# Patient Record
Sex: MALE | Race: ASIAN
[De-identification: names, ages, dates, MRNs, and addresses within clinical notes are randomized per-mention and may not be internally consistent; named-entity substitution may affect disease eponyms.]

---

## 2017-01-12 ENCOUNTER — HOSPITAL ENCOUNTER (INPATIENT)
Dept: HOSPITAL 36 - ER | Age: 32
LOS: 21 days | Discharge: NURSING, REHAB OR LONG-TERM CARE | DRG: 247 | End: 2017-02-02
Attending: INTERNAL MEDICINE | Admitting: INTERNAL MEDICINE
Payer: MEDICAID

## 2017-01-12 DIAGNOSIS — R13.10: ICD-10-CM

## 2017-01-12 DIAGNOSIS — E46: ICD-10-CM

## 2017-01-12 DIAGNOSIS — E86.0: ICD-10-CM

## 2017-01-12 DIAGNOSIS — D64.9: ICD-10-CM

## 2017-01-12 DIAGNOSIS — Z93.1: ICD-10-CM

## 2017-01-12 DIAGNOSIS — K56.41: Primary | ICD-10-CM

## 2017-01-12 DIAGNOSIS — M81.0: ICD-10-CM

## 2017-01-12 DIAGNOSIS — Z91.14: ICD-10-CM

## 2017-01-12 DIAGNOSIS — G80.0: ICD-10-CM

## 2017-01-12 DIAGNOSIS — H66.90: ICD-10-CM

## 2017-01-12 DIAGNOSIS — F79: ICD-10-CM

## 2017-01-12 DIAGNOSIS — G40.909: ICD-10-CM

## 2017-01-12 DIAGNOSIS — R53.81: ICD-10-CM

## 2017-01-12 DIAGNOSIS — M85.80: ICD-10-CM

## 2017-01-12 DIAGNOSIS — Z98.890: ICD-10-CM

## 2017-01-12 DIAGNOSIS — M19.90: ICD-10-CM

## 2017-01-12 DIAGNOSIS — Z74.01: ICD-10-CM

## 2017-01-12 LAB
ERYTHROCYTE [DISTWIDTH] IN BLOOD BY AUTOMATED COUNT: 12.5 % (ref 11.5–20)
HCT VFR BLD CALC: 54.2 % (ref 39–49)
HGB BLD-MCNC: 18.7 GM/DL (ref 13.2–17.3)
MCH RBC QN AUTO: 30.5 PG (ref 26–30)
MCHC RBC AUTO-ENTMCNC: 34.5 PG (ref 28–36)
MCV RBC AUTO: 88.5 FL (ref 80–99)
PLATELET # BLD: 239 TH/CMM (ref 150–400)
PMV BLD AUTO: 8.2 FL
RBC # BLD AUTO: 6.12 MIL/CMM (ref 4.3–5.7)
WBC # BLD AUTO: 12.9 TH/CMM (ref 4.8–10.8)

## 2017-01-12 PROCEDURE — Z7610: HCPCS

## 2017-01-12 PROCEDURE — Z7506: HCPCS

## 2017-01-12 PROCEDURE — C9113 INJ PANTOPRAZOLE SODIUM, VIA: HCPCS

## 2017-01-12 NOTE — ED PHYSICIAN CHART
Chief Complaint/HPI





- Patient Information


Date Seen:: 01/12/17


Time Seen:: 23:15


Chief Complaint:: vomiting


History of Present Illness:: 





has vomited 4 times since 1600.  No diarrhea.





Allergies:: 


 Allergies











Allergy/AdvReac Type Severity Reaction Status Date / Time


 


MDX No Known Allergies - Nka Allergy   Verified 12/04/12 10:42





[No Known Allergies - Nka]     











Historian:: EMS, Family Member


Review:: Nurse's Note Reviewed, Transfer documents Reviewed





Review of Systems





- Review of Systems


General/Constitutional: No fever, No chills


Skin: No skin lesions


Head: No headache


Eyes: No loss of vision


ENT: No earache


Neck: No neck pain


Cardio Vascular: No chest pain, No palpitations


Pulmonary: No SOB


GI: Vomiting


G/U: No dysuria


Musculoskeletal: No bone or joint pain


Endocrine: No polyuria, No polydipsia


Psychiatric: No prior psych history


Hematopoietic: No bruising


Allergic/Immuno: No urticaria


Neurological: No syncope





Past Medical History





- Past Medical History


Past Medical History: Arthritis, Other (cerebral palsy; spastic quadrplegia; 

osteopenia; dysphagia; chronic otitis media; osteoporosis)


Family History: Other (non-contributory)


Social History: Care Facility


Surgical History: PEG/GTube


Medication: Reviewed





Labs/Radiology/EKG Results





- Lab Results


Results: 


CXR: negative.  KUB: small bowel obstruction; large amount of stool in recto-

sigmoid


 Laboratory Results - last 24 hr











  01/12/17 01/12/17





  23:39 23:39


 


WBC  12.9 H D 


 


RBC  6.12 H 


 


Hgb  18.7 H D 


 


Hct  54.2 H D 


 


MCV  88.5 


 


MCH  30.5 H 


 


MCHC Differential  34.5 


 


RDW  12.5 


 


Plt Count  239  D 


 


MPV  8.2 


 


Band Neutrophils %  2 


 


Neutrophils (Manual)  90 H 


 


Lymphocytes  8 L 


 


Platelet Estimate  ADEQUATE 


 


Sodium   145


 


Potassium   4.0


 


Chloride   97 L


 


Carbon Dioxide   27.6


 


Anion Gap   24.4 H


 


BUN   28 H


 


Creatinine   1.3


 


Est GFR ( Amer)   > 60.0


 


Est GFR (Non-Af Amer)   > 60.0


 


BUN/Creatinine Ratio   21.5


 


Glucose   136 H


 


Calcium   11.5 H


 


Lipase   13














- Radiology Results


Results: 





chest x-ray negative; KUB: SBO; large stool in rectosigmoid





ED Septic Shock





- .


Is Septic Shock (SBP<90, OR Lactate>4 mmol\L) present?: No





Reassessment (Disposition)





- Reassessment


Reassessment Condition:: Unchanged





- Diagnosis


Diagnosis:: 





small bowel obstruction; leukocytosis; obstipation; profound mental retardation





- Patient Disposition


Admitted to:: Med/Surg


Spoke to:: Nicholas Calero


Admitting Medical Physician:: Nicholas Calero


Condition at Disposition:: Stable, Unchanged

## 2017-01-12 NOTE — ADMIT CRITERIA FORM
Admit Criteria Forms





- Admit Criteria


Diagnosis: 








VOMITING: OBSERVATION CARE





USE THIS FORM ONLY WHEN INPATIENT ADMISSION CRITERIA ARE NOT MET.





(Place X for any and all applicable criteria):





Placement for observation care is indicated for  patient with ANY ONE of the 

following(1)(2)(3)(4)(5)(6):


[]I.    Hemodynamic instability


[X]II.   Vomiting refractory to outpatient treatment (ie, precluding oral 

rehydration) 


[]III.  Electrolyte imbalance unable to be corrected in outpatient setting


[]IV.  Inability to take necessary medication or fluids orally


[]V.   Child whose situation includes ANY ONE of the following:


        []a)  Clinical response to outpatient therapy uncertain


        []b)  Outpatient supervision by parents or caregivers uncertain


[]VI.  Other observation care needs ( Use General Criteria: Observation Care)











The original [facility] content created by [facility] has been revised. 


The portions of the content which have been revised are identified through the 

use of italic text, 


and [facility] has neither reviewed nor approved the modified 

material. 


All other unmodified content is copyright  [facility].





Please see references footnoted in the original [facility] edition 

2016





Admit Criteria Met?: Yes

## 2017-01-13 LAB
ALBUMIN/GLOB SERPL: 1.2 {RATIO} (ref 1–1.8)
ALP SERPL-CCNC: 73 U/L (ref 34–104)
ALT SERPL-CCNC: 9 U/L (ref 7–52)
ANION GAP SERPL CALC-SCNC: 12.5 MMOL/L (ref 7–16)
ANION GAP SERPL CALC-SCNC: 24.4 MMOL/L (ref 7–16)
AST SERPL-CCNC: 14 U/L (ref 13–39)
BILIRUB SERPL-MCNC: 0.5 MG/DL (ref 0.3–1)
BUN SERPL-MCNC: 25 MG/DL (ref 7–25)
BUN SERPL-MCNC: 28 MG/DL (ref 7–25)
BUN/CREAT SERPL: 21.5
BUN/CREAT SERPL: 35.7
CALCIUM SERPL-MCNC: 11.5 MG/DL (ref 8.6–10.3)
CALCIUM SERPL-MCNC: 9.2 MG/DL (ref 8.6–10.3)
CHLORIDE SERPL-SCNC: 108 MEQ/L (ref 98–107)
CHLORIDE SERPL-SCNC: 97 MEQ/L (ref 98–107)
CO2 SERPL-SCNC: 27.6 MEQ/L (ref 21–31)
CO2 SERPL-SCNC: 27.9 MEQ/L (ref 21–31)
CREAT SERPL-MCNC: 0.7 MG/DL (ref 0.7–1.3)
CREAT SERPL-MCNC: 1.3 MG/DL (ref 0.7–1.3)
EOSINOPHIL NFR BLD: 0 % (ref 0–5)
EOSINOPHIL NFR BLD: 0 % (ref 0–5)
ERYTHROCYTE [DISTWIDTH] IN BLOOD BY AUTOMATED COUNT: 12.7 % (ref 11.5–20)
GLOBULIN SER-MCNC: 3.4 GM/DL
GLUCOSE SERPL-MCNC: 136 MG/DL (ref 70–105)
GLUCOSE SERPL-MCNC: 139 MG/DL (ref 70–105)
HCT VFR BLD CALC: 45.5 % (ref 39–49)
HGB BLD-MCNC: 15.4 GM/DL (ref 13.2–17.3)
LIPASE SERPL-CCNC: 13 U/L (ref 11–82)
MCH RBC QN AUTO: 30.2 PG (ref 26–30)
MCHC RBC AUTO-ENTMCNC: 33.8 PG (ref 28–36)
MCV RBC AUTO: 89.6 FL (ref 80–99)
NEUTROPHILS NFR BLD AUTO: 78 % (ref 40–80)
NEUTROPHILS NFR BLD AUTO: 90 % (ref 40–80)
NEUTS BAND NFR BLD: 2 % (ref 0–10)
NEUTS BAND NFR BLD: 5 % (ref 0–10)
PLAT MORPH BLD: NORMAL
PLATELET # BLD EST: ADEQUATE 10*3/UL
PLATELET # BLD EST: ADEQUATE 10*3/UL
PLATELET # BLD: 204 TH/CMM (ref 150–400)
PMV BLD AUTO: 8.5 FL
POTASSIUM SERPL-SCNC: 3.4 MEQ/L (ref 3.5–5.1)
POTASSIUM SERPL-SCNC: 4 MEQ/L (ref 3.5–5.1)
RBC # BLD AUTO: 5.08 MIL/CMM (ref 4.3–5.7)
RBC MORPH BLD: NORMAL
SODIUM SERPL-SCNC: 145 MEQ/L (ref 136–145)
SODIUM SERPL-SCNC: 145 MEQ/L (ref 136–145)
TOTAL CELLS COUNTED BLD: 100
TOTAL CELLS COUNTED BLD: 100
WBC # BLD AUTO: 8.5 TH/CMM (ref 4.8–10.8)

## 2017-01-13 RX ADMIN — DEXTROSE AND SODIUM CHLORIDE SCH MLS/HR: 5; .45 INJECTION, SOLUTION INTRAVENOUS at 03:00

## 2017-01-13 RX ADMIN — MAGNESIUM HYDROXIDE SCH ML: 400 SUSPENSION ORAL at 21:53

## 2017-01-13 NOTE — HISTORY & PHYSICAL
CHIEF COMPLAINT:  Vomiting.



HISTORY OF PRESENT ILLNESS:  This is a 31-year-old male who is a resident of

[facility] who is brought here to [facility] for

reports of vomiting x 3 since yesterday afternoon.  The patient did not have any

diarrhea or any fevers.  For this reason, the patient is now admitted to the

med/surg unit.



PAST MEDICAL HISTORY:  Arthritis, cerebral palsy, spastic quadriplegia,

osteopenia, dysphagia.



FAMILY HISTORY:  Noncontributory.



ALLERGIES:  No drug allergies.



SOCIAL HISTORY:  The patient resides at [facility], requiring 24-hour

nursing care.



PAST SURGICAL HISTORY:  PEG.



MEDICATIONS:  Please see medication reconciliation sheet.



REVIEW OF SYSTEMS:  Unable to obtain, the patient is non-interactive.



PHYSICAL EXAMINATION:

GENERAL:  The patient is well developed, well nourished, no acute distress.

VITAL SIGNS:  Temperature 99, heart rate 90, blood pressure 170/71, respirations

18, O2 100%.

HEENT:  Head; normocephalic, atraumatic.

NECK:  Supple.  No mass.

LUNGS:  Clear bilaterally upon auscultation.

CARDIOVASCULAR:  Regular rhythm.  No murmurs or gallops.

SKIN:  Intact, warm and dry to touch.

ABDOMEN:  Soft, nontender, nondistended.  Positive bowel sounds in all 4

quadrants.



LABORATORY DATA:  WBC 12.9, H and H 18.7 and 54.2.  Sodium 145, potassium 4.0,

chloride 97, BUN ____, creatinine 1.3.



The patient had a chest x-ray done and the impression is no focal acute

pulmonary processes.



ASSESSMENT:  Abdominal pain, intractable vomiting, spastic quadriplegia,

intellectual disability and seizures.



PLAN:  The patient to be admitted to the med/surg unit.  The patient to have a

consultation with Dr. Ames.  The patient to be kept n.p.o. for now.  IV fluids

for hydration.  We will continue to monitor the patient.





DD: 01/13/2017 12:58

DT: 01/13/2017 17:28

JOB# [id number]  [id number]

## 2017-01-13 NOTE — INTERNAL MEDICINE PROG NOTE
Internal Medicine Subjective





- Subjective


Service Date: 01/13/17 ([facility] dictated [id number])





Internal Medicine Objective





- Results


Result Diagrams: 


 01/13/17 06:16





 01/13/17 06:16


Recent Labs: 


 Laboratory Last Values











WBC  8.5 Th/cmm (4.8-10.8)  D 01/13/17  06:16    


 


RBC  5.08 Mil/cmm (4.30-5.70)   01/13/17  06:16    


 


Hgb  15.4 gm/dL (13.2-17.3)  D 01/13/17  06:16    


 


Hct  45.5 % (39.0-49.0)  D 01/13/17  06:16    


 


MCV  89.6 fl (80-99)   01/13/17  06:16    


 


MCH  30.2 pg (26.0-30.0)  H  01/13/17  06:16    


 


MCHC Differential  33.8 pg (28.0-36.0)   01/13/17  06:16    


 


RDW  12.7 % (11.5-20.0)   01/13/17  06:16    


 


Plt Count  204 Th/cmm (150-400)   01/13/17  06:16    


 


MPV  8.5 fl  01/13/17  06:16    


 


Band Neutrophils %  5 % (0-10)   01/13/17  06:16    


 


Neutrophils (Manual)  78 % (40-80)   01/13/17  06:16    


 


Lymphocytes  7 % (20-50)  L  01/13/17  06:16    


 


Monocytes  10 % (2-10)   01/13/17  06:16    


 


Eosinophils  0 % (0-5)   01/13/17  06:16    


 


Platelet Estimate  ADEQUATE  (NORMAL)   01/13/17  06:16    


 


Platelet Morphology  NORMAL  (NORMAL)   01/13/17  06:16    


 


RBC Morph Micro Appear  NORMAL  (NORMAL)   01/13/17  06:16    


 


Sodium  145 mEq/L (136-145)   01/13/17  06:16    


 


Potassium  3.4 mEq/L (3.5-5.1)  L  01/13/17  06:16    


 


Chloride  108 mEq/L ()  H  01/13/17  06:16    


 


Carbon Dioxide  27.9 mEq/L (21.0-31.0)   01/13/17  06:16    


 


Anion Gap  12.5  (7.0-16.0)   01/13/17  06:16    


 


BUN  25 mg/dL (7-25)   01/13/17  06:16    


 


Creatinine  0.7 mg/dL (0.7-1.3)   01/13/17  06:16    


 


Est GFR ( Amer)  > 60.0 ml/min  01/13/17  06:16    


 


Est GFR (Non-Af Amer)  > 60.0 ml/min  01/13/17  06:16    


 


BUN/Creatinine Ratio  35.7   01/13/17  06:16    


 


Glucose  139 mg/dL ()  H  01/13/17  06:16    


 


Calcium  9.2 mg/dL (8.6-10.3)   01/13/17  06:16    


 


Total Bilirubin  0.5 mg/dL (0.3-1.0)   01/13/17  06:16    


 


AST  14 U/L (13-39)   01/13/17  06:16    


 


ALT  9 U/L (7-52)   01/13/17  06:16    


 


Alkaline Phosphatase  73 U/L ()   01/13/17  06:16    


 


Total Protein  7.6 gm/dL (6.0-8.3)   01/13/17  06:16    


 


Albumin  4.2 gm/dL (4.2-5.5)   01/13/17  06:16    


 


Globulin  3.4 gm/dL  01/13/17  06:16    


 


Albumin/Globulin Ratio  1.2  (1.0-1.8)   01/13/17  06:16    


 


Lipase  13 U/L (11-82)   01/12/17  23:39    


 


TSH  0.20 uIU/ml (0.34-5.60)  L  01/13/17  06:16    














- Physical Exam


Vitals and I&O: 


 Vital Signs











Temp  99 F   01/13/17 05:00


 


Pulse  90   01/13/17 05:00


 


Resp  18   01/13/17 05:00


 


BP  117/71   01/13/17 05:00


 


Pulse Ox  100   01/13/17 05:00








 Intake & Output











 01/12/17 01/13/17 01/13/17





 18:59 06:59 18:59


 


Intake Total  4000.000 


 


Balance  4000.000 


 


Intake:   


 


  Intake, IV Amount  4000.000 











Active Medications: 


 Current Medications





Acetaminophen (Tylenol)  650 mg PO Q4HR PRN


   PRN Reason: Pain or Fever >101


   Stop: 03/13/17 23:50


Bisacodyl (Dulcolax 10 Mg Supp)  10 mg RC DAILY PRN


   PRN Reason: Constipation


   Stop: 03/13/17 23:52


Clonidine HCl (Catapres)  0.1 mg PO Q6HR PRN


   PRN Reason: SBP GREATER THAN 160


   Stop: 03/13/17 23:50


Dextrose/Sodium Chloride (D5-0.45ns)  1,000 mls @ 100 mls/hr IV .Q10H ALISON


   Stop: 03/13/17 23:44


   Last Admin: 01/13/17 03:00 Dose:  100 mls/hr


Magnesium Hydroxide (Milk Of Magnesia)  30 ml PO Q72H PRN


   PRN Reason: Constipation


   Stop: 03/13/17 23:52


Mineral Oil (Mineral Oil 30 Ml)  30 ml GT Q72H PRN


   PRN Reason: Constipation


   Stop: 03/13/17 23:52


Morphine Sulfate (Morphine)  2 mg IVP Q4HR PRN


   PRN Reason: Pain (Severe)


   Stop: 03/13/17 23:50


Ondansetron HCl (Zofran)  4 mg IV Q8H PRN


   PRN Reason: Nausea / Vomiting


   Stop: 03/13/17 23:50


Pantoprazole Sodium (Protonix)  40 mg IVP BID [facility]


   Stop: 03/14/17 08:59


   Last Admin: 01/13/17 10:48 Dose:  40 mg


Prochlorperazine (Compazine)  25 mg RC Q12H PRN; Protocol


   PRN Reason: Nausea


   Stop: 03/13/17 23:52


Sodium Phosphate (Fleet Enema)  135 ml RC Q72H PRN


   PRN Reason: Constipation


   Stop: 03/13/17 23:52


Sodium Phosphate (Fleet Enema)  135 ml RC X1 ONE


   Stop: 01/13/17 12:52


Vitamin D (Vitamin D)  400 iu GT DAILY [facility]


   Stop: 03/14/17 08:59


   Last Admin: 01/13/17 10:44 Dose:  Not Given











- Procedures


Procedures: 


 Procedures











Procedure Code Date


 


EGD BIOPSY SINGLE/MULTIPLE 19685 07/10/14


 


ESOPHAGOGASTRODUODENOSCOPY [EGD] W/CLOSED BIOPSY 45.16 07/10/14


 


INFLUENZA VACCINATION 99.52 01/30/14


 


INSERT INDWELLING CATH 57.94 12/03/12


 


INSERT INTESTINAL TUBE 96.08 12/03/12


 


INSERT PICC CATH [id number] 12/03/12


 


INSERT TEMP BLADDER CATH [id number] 12/03/12


 


OPEN AND OTHER CECECTOMY 45.72 12/03/12


 


OPEN AND OTHER RIGHT HEMICOLECTOMY 45.73 12/03/12


 


PARTIAL REMOVAL OF COLON 50804 12/03/12


 


UNLISTED PX SMALL INTESTINE 25689 12/03/12


 


VENOUS CATHETERIZATION NEC 38.93 12/03/12














Internal Medicine Assmt/Plan





- Assessment


Assessment: 





abdominal pain, intractable vomiting

## 2017-01-13 NOTE — DIAGNOSTIC IMAGING REPORT
CHEST X-RAY: AP view



INDICATION: Small bowel obstruction



COMPARISON: KUB the same day



FINDINGS: No focal consolidation or pleural effusions. Heart size is

normal. Distended bowel loops are seen along the right upper quadrant.

Osseous structures are intact.



IMPRESSION:



No focal acute pulmonary process. Distended bowel loops along the right

upper quadrant. Please refer to KUB the same day for further findings.

## 2017-01-13 NOTE — DIAGNOSTIC IMAGING REPORT
KUB



History: Obstruction, constipation



comparison: Chest x-ray the same day



Findings: There is severe distal fecal impaction. Diffuse distended

loops of bowel are seen proximally including markedly distended small

bowel loops. No evidence of gross free air. A gastric feeding tube is

noted. Spinal scoliosis is noted. 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

dysplasia of both hips.



IMPRESSION:



Severe distal fecal impaction. Diffuse distended loops of bowel are seen

proximally including markedly distended small bowel loops. The distended

small bowel loops may be due to patient's severe distal fecal impaction,

however, a distal small bowel obstructive process cannot be completely

excluded. Clinical correlation and follow-up is recommended.

## 2017-01-13 NOTE — ADMIT CRITERIA FORM
Admit Criteria Forms





- Admit Criteria


Diagnosis: 





                                                   VOMITING





Clinical Indications for Admission to Inpatient Care





                                                                             (

Place 'X' for any and all applicable criteria):





Admission is indicated for ANY ONE of the following(1)(2)(3): 


[ ]I.    Inpatient admission required rather than observation care because of 

ANY ONE of the  following:


         [ ]i)      Hemodynamic instability that is severe or persistent 


         [ ]ii)      Vomiting that is severe or persistent


         [ ]iii)     Severe electrolyte abnormalities requiring inpatient care


         [ ]iv)     Severe pain requiring acute inpatient management


         [ ]v)      High fever or infection requiring inpatient admission as 

indicated by ANY ONE of the following(7)(8):


                     [ ]1)   Appropriate outpatient or observation care 

antimicrobial treatment unavailable, not effective, or not feasible 


                     [ ]2)   Documented bacteremia


                     [ ]3)   Temp >104.9 degrees F (40.5 degrees C) (oral)


                     [ ]4)   Temp >103.1 degrees F (39.5 C) (oral) or <96.8 

degrees F (36 C) (rectal) that 


                              does not respond to all emergency treatment 

measures


         [ ]vi)     Acute renal failure


         [ ]vii)    IV fluid to replace significant ongoing losses (greater 

than 3 L/m2 per day)


         [ ]viii)    Parenteral nutrition regimen that must be implemented on 

inpatient basis


         [ ]ix)     Other condition, treatment or monitoring requiring 

inpatient admission  


[X ]II.    Complete or partial gastrointestinal obstruction


[ ]III.   Other cause of vomiting requiring hospitalization (eg, poisoning, 

increased intracranial pressure)


[ ]IV.   Vomiting due to significant metabolic derangement (eg, severe 

hypercalcemia, diabetic ketoacidosis)











Extended stay beyond goal length of stay may be needed for(1)(4):


[ ]a)     Severe vomiting


[ ]b)     Persistent vomiting, vital sign changes, severe electrolyte imbalance

, or diagnosed cause of vomiting that


           requires continued hospitalization (eg, gastrointestinal obstruction

, increased intracranial pressure)


[ ]c)     Surgery to treat identified causes of vomiting (eg, bowel obstruction

, intracranial process)


[ ]d)     Comorbid illness that requires inpatient care (eg, acute heart failure

, renal failure)


[ ]e)     Need for inpatient endoscopy








The original [facility] content created by [facility] has been revised. 


The portions of the content which have been revised are identified through the 

use of italic text or in bold, and [facility] 


has neither reviewed nor approved the modified material. All other unmodified 

content is copyright  [facility].





Please see references footnoted in the original [facility] edition 

2016





Admit Criteria Met?: Yes

## 2017-01-14 LAB
BACTERIA #/AREA URNS HPF: (no result) /HPF
BILIRUB UR-MCNC: NEGATIVE MG/DL
COLOR UR: YELLOW
EPI CELLS URNS QL MICRO: (no result) /LPF
GLUCOSE UR STRIP-MCNC: NEGATIVE MG/DL
KETONES UR STRIP-MCNC: NEGATIVE MG/DL
PH UR STRIP: >=9 [PH]
PROT UR STRIP-MCNC: (no result) MG/DL
RBC # UR STRIP: NEGATIVE /UL
RBC #/AREA URNS HPF: (no result) /HPF (ref 0–5)
UROBILINOGEN UR STRIP-ACNC: 0.2 E.U./DL (ref 0.2–1)
WBC #/AREA URNS HPF: (no result) /HPF (ref 0–5)

## 2017-01-14 RX ADMIN — DEXTROSE AND SODIUM CHLORIDE SCH MLS/HR: 5; .45 INJECTION, SOLUTION INTRAVENOUS at 05:15

## 2017-01-14 RX ADMIN — MAGNESIUM HYDROXIDE SCH ML: 400 SUSPENSION ORAL at 21:57

## 2017-01-14 RX ADMIN — DEXTROSE AND SODIUM CHLORIDE SCH MLS/HR: 5; .45 INJECTION, SOLUTION INTRAVENOUS at 21:56

## 2017-01-14 NOTE — CONSULTATION
REASON FOR CONSULTATION:  Abdominal pain and vomiting.



HISTORY OF PRESENT ILLNESS:  This consult was obtained through the courtesy of

Dr. Calero for this 31-year-old with history of mental retardation, cerebral

palsy, spastic quadriplegia, osteopenia, dysphagia, status post G-tube and

arthritis, admitted to the hospital because of vomiting few times yesterday. 

The patient came to the hospital.  GI consult was called in for further

evaluation.  The patient is unable to provide any history.  Apparently, the

patient had some fecal impaction, was given some laxative, did better, and now

he seems calmer.



PAST MEDICAL HISTORY:  Cerebral palsy, spastic quadriplegia, arthritis,

osteopenia, dysphagia.



PAST SURGICAL HISTORY:  He had hip surgery and he had abdominal surgery and has

a G-tube.



SOCIAL HISTORY:  Nonsmoker, nonalcoholic, non-IV drug abuser.



FAMILY HISTORY:  Noncontributory.



ALLERGIES:  No known drug allergies.



MEDICATIONS:  The patient is on Tylenol, Dulcolax, clonidine, milk of magnesia,

mineral oil, Zofran, morphine, Protonix, Fleet enema, vitamin D.



REVIEW OF SYSTEMS:  Unobtainable.



PHYSICAL EXAMINATION:

GENERAL:  The patient is awake, nonverbal.

VITAL SIGNS:  Blood pressure is 117/71, heart rate 90, respiratory rate 18,

temperature is 99.0.

HEAD AND NECK:  Pupils reactive to light.  Extraocular muscles could not be

tested.  Oral cavity, no lesion.

NECK:  Supple.  No jugular venous distention.  No carotid bruit or lymph nodes.

CHEST:  Good respiratory movements.

LUNGS:  Clear to auscultation.

CARDIOVASCULAR SYSTEM:  Regular rate and rhythm.  No murmur or gallop.

ABDOMEN:  Soft.  There is scar on midline.  There is a G-tube, which has a

balloon.  Bowel sounds are present.  Nontender.

EXTREMITIES:  Lower extremities, no edema.  There is a scar on the left hip.

CENTRAL NERVOUS SYSTEM:  Unable to evaluate.  Limbs has contracted posture of

the lower ones.



LABORATORY DATA:  White count was 12.9, now is normal.  Potassium was low.  H

and H are normal.  KUB showed fecal impaction.



IMPRESSION:  A 31-year-old with multiple medical problems, now with fecal

impaction, nausea and vomiting.



ASSESSMENT AND PLAN:  Nausea and vomiting most probably due to fecal impaction. 

The patient had good response to laxative, so now he is more comfortable, he is

not vomiting, he is at ease.  So, we will continue his bowel prep.  We will give

him on a daily basis milk of magnesia.  If needed, we can add some more.  Then,

further recommendations to follow.  Given the age of the patient and general

condition, at this time there is no reason for colonoscopy unless some other

issues arise.



Other medical problems such as cerebral palsy, quadriplegia, osteoporosis, etc.,

as per Dr. Calero.



Thank you, Dr. Calero, for allowing me to participate in the care of this

patient.  If you have any further questions, please let me know.





DD: 01/13/2017 19:18

DT: 01/14/2017 04:25

JOB# [id number]  [id number]

## 2017-01-15 RX ADMIN — MAGNESIUM HYDROXIDE SCH ML: 400 SUSPENSION ORAL at 22:52

## 2017-01-15 NOTE — INTERNAL MEDICINE PROG NOTE
Internal Medicine Subjective





- Subjective


Service Date: 01/15/17


Patient seen and examined:: with staff


Patient is:: awake


Per staff patient is:: no adverse event





Internal Medicine Objective





- Results


Result Diagrams: 


 01/13/17 06:16





 01/13/17 06:16


Recent Labs: 


 Laboratory Last Values











WBC  8.5 Th/cmm (4.8-10.8)  D 01/13/17  06:16    


 


RBC  5.08 Mil/cmm (4.30-5.70)   01/13/17  06:16    


 


Hgb  15.4 gm/dL (13.2-17.3)  D 01/13/17  06:16    


 


Hct  45.5 % (39.0-49.0)  D 01/13/17  06:16    


 


MCV  89.6 fl (80-99)   01/13/17  06:16    


 


MCH  30.2 pg (26.0-30.0)  H  01/13/17  06:16    


 


MCHC Differential  33.8 pg (28.0-36.0)   01/13/17  06:16    


 


RDW  12.7 % (11.5-20.0)   01/13/17  06:16    


 


Plt Count  204 Th/cmm (150-400)   01/13/17  06:16    


 


MPV  8.5 fl  01/13/17  06:16    


 


Band Neutrophils %  5 % (0-10)   01/13/17  06:16    


 


Neutrophils (Manual)  78 % (40-80)   01/13/17  06:16    


 


Lymphocytes  7 % (20-50)  L  01/13/17  06:16    


 


Monocytes  10 % (2-10)   01/13/17  06:16    


 


Eosinophils  0 % (0-5)   01/13/17  06:16    


 


Platelet Estimate  ADEQUATE  (NORMAL)   01/13/17  06:16    


 


Platelet Morphology  NORMAL  (NORMAL)   01/13/17  06:16    


 


RBC Morph Micro Appear  NORMAL  (NORMAL)   01/13/17  06:16    


 


Sodium  145 mEq/L (136-145)   01/13/17  06:16    


 


Potassium  3.4 mEq/L (3.5-5.1)  L  01/13/17  06:16    


 


Chloride  108 mEq/L ()  H  01/13/17  06:16    


 


Carbon Dioxide  27.9 mEq/L (21.0-31.0)   01/13/17  06:16    


 


Anion Gap  12.5  (7.0-16.0)   01/13/17  06:16    


 


BUN  25 mg/dL (7-25)   01/13/17  06:16    


 


Creatinine  0.7 mg/dL (0.7-1.3)   01/13/17  06:16    


 


Est GFR ( Amer)  > 60.0 ml/min  01/13/17  06:16    


 


Est GFR (Non-Af Amer)  > 60.0 ml/min  01/13/17  06:16    


 


BUN/Creatinine Ratio  35.7   01/13/17  06:16    


 


Glucose  139 mg/dL ()  H  01/13/17  06:16    


 


Calcium  9.2 mg/dL (8.6-10.3)   01/13/17  06:16    


 


Total Bilirubin  0.5 mg/dL (0.3-1.0)   01/13/17  06:16    


 


AST  14 U/L (13-39)   01/13/17  06:16    


 


ALT  9 U/L (7-52)   01/13/17  06:16    


 


Alkaline Phosphatase  73 U/L ()   01/13/17  06:16    


 


Total Protein  7.6 gm/dL (6.0-8.3)   01/13/17  06:16    


 


Albumin  4.2 gm/dL (4.2-5.5)   01/13/17  06:16    


 


Globulin  3.4 gm/dL  01/13/17  06:16    


 


Albumin/Globulin Ratio  1.2  (1.0-1.8)   01/13/17  06:16    


 


Lipase  13 U/L (11-82)   01/12/17  23:39    


 


TSH  0.20 uIU/ml (0.34-5.60)  L  01/13/17  06:16    


 


Urine Source  CLEAN C   01/14/17  21:35    


 


Urine Color  YELLOW   01/14/17  21:35    


 


Urine Clarity  CLEAR  (CLEAR)   01/14/17  21:35    


 


Urine pH  >=9.0   01/14/17  21:35    


 


Ur Specific Gravity  1.020  (1.005-1.030)   01/14/17  21:35    


 


Urine Protein  TRACE mg/dL (NEGATIVE)   01/14/17  21:35    


 


Urine Glucose (UA)  NEGATIVE mg/dL (NEGATIVE)   01/14/17  21:35    


 


Urine Ketones  NEGATIVE mg/dL (NEGATIVE)   01/14/17  21:35    


 


Urine Blood  NEGATIVE  (NEGATIVE)   01/14/17  21:35    


 


Urine Nitrate  NEGATIVE  (NEGATIVE)   01/14/17  21:35    


 


Urine Bilirubin  NEGATIVE  (NEGATIVE)   01/14/17  21:35    


 


Urine Urobilinogen  0.2 E.U./dL (0.2 - 1.0)   01/14/17  21:35    


 


Ur Leukocyte Esterase  NEGATIVE  (NEGATIVE)   01/14/17  21:35    


 


Urine RBC  0-2 /hpf (0-5)  H  01/14/17  21:35    


 


Urine WBC  0-2 /hpf (0-5)   01/14/17  21:35    


 


Ur Epithelial Cells  NONE SEEN /lpf (FEW)   01/14/17  21:35    


 


Urine Bacteria  FEW /hpf (NONE SEEN)   01/14/17  21:35    














- Physical Exam


Vitals and I&O: 


 Vital Signs











Temp  98.1 F   01/15/17 16:00


 


Pulse  83   01/15/17 16:00


 


Resp  17   01/15/17 16:00


 


BP  112/40   01/15/17 16:00


 


Pulse Ox  95   01/15/17 16:00








 Intake & Output











 01/14/17 01/15/17 01/15/17





 18:59 06:59 18:59


 


Intake Total 360  


 


Balance 360  


 


Intake:   


 


  Tube Feeding 240  


 


  Other 120  











Active Medications: 


 Current Medications





Acetaminophen (Tylenol)  650 mg PO Q4HR PRN


   PRN Reason: Pain or Fever >101


   Stop: 03/13/17 23:50


Bisacodyl (Dulcolax 10 Mg Supp)  10 mg RC DAILY PRN


   PRN Reason: Constipation


   Stop: 03/13/17 23:52


Clonidine HCl (Catapres)  0.1 mg PO Q6HR PRN


   PRN Reason: SBP GREATER THAN 160


   Stop: 03/13/17 23:50


Dextrose/Sodium Chloride (D5-0.45ns)  1,000 mls @ 70 mls/hr IV .[id number] [facility]


   Stop: 03/15/17 21:46


   Last Admin: 01/14/17 21:56 Dose:  70 mls/hr


Magnesium Hydroxide (Milk Of Magnesia)  30 ml PO HS [facility]


   Stop: 03/14/17 20:59


   Last Admin: 01/14/17 21:57 Dose:  30 ml


Mineral Oil (Mineral Oil 30 Ml)  30 ml GT DAILY [facility]


   Stop: 03/15/17 08:59


   Last Admin: 01/15/17 10:59 Dose:  30 ml


Morphine Sulfate (Morphine)  2 mg IVP Q4HR PRN


   PRN Reason: Pain (Severe)


   Stop: 03/13/17 23:50


Ondansetron HCl (Zofran)  4 mg IV Q8H PRN


   PRN Reason: Nausea / Vomiting


   Stop: 03/13/17 23:50


Pantoprazole Sodium (Protonix)  40 mg IVP BID [facility]


   Stop: 03/14/17 08:59


   Last Admin: 01/15/17 10:59 Dose:  40 mg


Polyethylene Glycol (Miralax)  17 gm PO DAILY [facility]


   Stop: 03/16/17 08:59


   Last Admin: 01/15/17 10:59 Dose:  17 gm


Prochlorperazine (Compazine)  25 mg RC Q12H PRN; Protocol


   PRN Reason: Nausea


   Stop: 03/13/17 23:52


Sodium Phosphate (Fleet Enema)  135 ml RC Q72H PRN


   PRN Reason: Constipation


   Stop: 03/13/17 23:52


Vitamin D (Vitamin D)  400 iu GT DAILY [facility]


   Stop: 03/14/17 08:59


   Last Admin: 01/15/17 10:59 Dose:  400 iu








General: alert


HEENT: NC/AT, PERRLA


Neck: Supple


Lungs: CTAB


Cardiovascular: RRR, Normal S1, Normal S2, without murmur


Abdomen: soft non-tender





- Procedures


Procedures: 


 Procedures











Procedure Code Date


 


EGD BIOPSY SINGLE/MULTIPLE 06210 07/10/14


 


ESOPHAGOGASTRODUODENOSCOPY [EGD] W/CLOSED BIOPSY 45.16 07/10/14


 


INFLUENZA VACCINATION 99.52 01/30/14


 


INSERT INDWELLING CATH 57.94 12/03/12


 


INSERT INTESTINAL TUBE 96.08 12/03/12


 


INSERT PICC CATH [id number] 12/03/12


 


INSERT TEMP BLADDER CATH [id number] 12/03/12


 


OPEN AND OTHER CECECTOMY 45.72 12/03/12


 


OPEN AND OTHER RIGHT HEMICOLECTOMY 45.73 12/03/12


 


PARTIAL REMOVAL OF COLON 37091 12/03/12


 


UNLISTED PX SMALL INTESTINE 22545 12/03/12


 


VENOUS CATHETERIZATION NEC 38.93 12/03/12














Internal Medicine Assmt/Plan





- Assessment


Assessment: 





abdominal pain


 intractable vomitinG





- Plan


Plan: 


ivf for hydration


gtf 


zofran prn


monitor electrolytes


cbc/bmp in am

## 2017-01-15 NOTE — DIAGNOSTIC IMAGING REPORT
KUB abdominal film



HISTORY: Abdominal distention



Compared with the prior exam of 1/12/2017, slight decrease in small

bowel dilatation along with persistent mildly dilated air-filled large

bowel. Stool noted in the rectal region of May be associated with a

degree of fecal impaction.



IMPRESSION:

1. Stool-filled distended rectum suggesting a fecal impaction.

Additional mildly dilated large and small bowel.

## 2017-01-16 LAB
ANION GAP SERPL CALC-SCNC: 8.5 MMOL/L (ref 7–16)
BASOPHILS NFR BLD AUTO: 0.2 % (ref 0–2)
BUN SERPL-MCNC: 12 MG/DL (ref 7–25)
BUN/CREAT SERPL: 24
CALCIUM SERPL-MCNC: 9.5 MG/DL (ref 8.6–10.3)
CHLORIDE SERPL-SCNC: 104 MEQ/L (ref 98–107)
CO2 SERPL-SCNC: 26.6 MEQ/L (ref 21–31)
CREAT SERPL-MCNC: 0.5 MG/DL (ref 0.7–1.3)
EOSINOPHIL NFR BLD AUTO: 5.5 % (ref 0–5)
ERYTHROCYTE [DISTWIDTH] IN BLOOD BY AUTOMATED COUNT: 12.4 % (ref 11.5–20)
GLUCOSE SERPL-MCNC: 115 MG/DL (ref 70–105)
HCT VFR BLD CALC: 40.6 % (ref 39–49)
HGB BLD-MCNC: 14 GM/DL (ref 13.2–17.3)
LYMPHOCYTES NFR BLD AUTO: 33.4 % (ref 20–50)
MCH RBC QN AUTO: 29.7 PG (ref 26–30)
MCHC RBC AUTO-ENTMCNC: 34.4 PG (ref 28–36)
MCV RBC AUTO: 86.2 FL (ref 80–99)
MONOCYTES NFR BLD AUTO: 9 % (ref 2–10)
NEUTROPHILS # BLD: 3.8 TH/CMM (ref 1.8–8)
NEUTROPHILS NFR BLD AUTO: 51.9 % (ref 40–80)
PLATELET # BLD: 168 TH/CMM (ref 150–400)
PMV BLD AUTO: 8.5 FL
POTASSIUM SERPL-SCNC: 4.1 MEQ/L (ref 3.5–5.1)
RBC # BLD AUTO: 4.71 MIL/CMM (ref 4.3–5.7)
SODIUM SERPL-SCNC: 135 MEQ/L (ref 136–145)
WBC # BLD AUTO: 7.4 TH/CMM (ref 4.8–10.8)

## 2017-01-16 RX ADMIN — MAGNESIUM HYDROXIDE SCH ML: 400 SUSPENSION ORAL at 22:36

## 2017-01-16 RX ADMIN — POLYETHYLENE GLYCOL 3350 SCH GM: 17 POWDER, FOR SOLUTION ORAL at 09:39

## 2017-01-16 RX ADMIN — METOCLOPRAMIDE SCH: 5 INJECTION, SOLUTION INTRAMUSCULAR; INTRAVENOUS at 13:00

## 2017-01-16 RX ADMIN — POLYETHYLENE GLYCOL 3350 SCH GM: 17 POWDER, FOR SOLUTION ORAL at 16:44

## 2017-01-16 RX ADMIN — DEXTROSE AND SODIUM CHLORIDE SCH MLS/HR: 5; .45 INJECTION, SOLUTION INTRAVENOUS at 03:48

## 2017-01-16 RX ADMIN — METOCLOPRAMIDE SCH MG: 5 INJECTION, SOLUTION INTRAMUSCULAR; INTRAVENOUS at 05:46

## 2017-01-16 RX ADMIN — METOCLOPRAMIDE SCH MG: 5 INJECTION, SOLUTION INTRAMUSCULAR; INTRAVENOUS at 22:36

## 2017-01-16 NOTE — INTERNAL MEDICINE PROG NOTE
Internal Medicine Subjective





- Subjective


Service Date: 01/16/17


Patient seen and examined:: with staff


Patient is:: awake


Per staff patient is:: no adverse event





Internal Medicine Objective





- Results


Result Diagrams: 


 01/16/17 05:50





 01/16/17 05:50


Recent Labs: 


 Laboratory Last Values











WBC  7.4 Th/cmm (4.8-10.8)   01/16/17  05:50    


 


RBC  4.71 Mil/cmm (4.30-5.70)   01/16/17  05:50    


 


Hgb  14.0 gm/dL (13.2-17.3)   01/16/17  05:50    


 


Hct  40.6 % (39.0-49.0)  D 01/16/17  05:50    


 


MCV  86.2 fl (80-99)   01/16/17  05:50    


 


MCH  29.7 pg (26.0-30.0)   01/16/17  05:50    


 


MCHC Differential  34.4 pg (28.0-36.0)   01/16/17  05:50    


 


RDW  12.4 % (11.5-20.0)   01/16/17  05:50    


 


Plt Count  168 Th/cmm (150-400)   01/16/17  05:50    


 


MPV  8.5 fl  01/16/17  05:50    


 


Neutrophils %  51.9 % (40.0-80.0)   01/16/17  05:50    


 


Band Neutrophils %  5 % (0-10)   01/13/17  06:16    


 


Lymphocytes %  33.4 % (20.0-50.0)   01/16/17  05:50    


 


Monocytes %  9.0 % (2.0-10.0)   01/16/17  05:50    


 


Eosinophils %  5.5 % (0.0-5.0)  H  01/16/17  05:50    


 


Basophils %  0.2 % (0.0-2.0)   01/16/17  05:50    


 


Neutrophils (Manual)  78 % (40-80)   01/13/17  06:16    


 


Lymphocytes  7 % (20-50)  L  01/13/17  06:16    


 


Monocytes  10 % (2-10)   01/13/17  06:16    


 


Eosinophils  0 % (0-5)   01/13/17  06:16    


 


Platelet Estimate  ADEQUATE  (NORMAL)   01/13/17  06:16    


 


Platelet Morphology  NORMAL  (NORMAL)   01/13/17  06:16    


 


RBC Morph Micro Appear  NORMAL  (NORMAL)   01/13/17  06:16    


 


Sodium  135 mEq/L (136-145)  L  01/16/17  05:50    


 


Potassium  4.1 mEq/L (3.5-5.1)   01/16/17  05:50    


 


Chloride  104 mEq/L ()   01/16/17  05:50    


 


Carbon Dioxide  26.6 mEq/L (21.0-31.0)   01/16/17  05:50    


 


Anion Gap  8.5  (7.0-16.0)   01/16/17  05:50    


 


BUN  12 mg/dL (7-25)   01/16/17  05:50    


 


Creatinine  0.5 mg/dL (0.7-1.3)  L  01/16/17  05:50    


 


Est GFR ( Amer)  > 60.0 ml/min  01/16/17  05:50    


 


Est GFR (Non-Af Amer)  > 60.0 ml/min  01/16/17  05:50    


 


BUN/Creatinine Ratio  24.0   01/16/17  05:50    


 


Glucose  115 mg/dL ()  H  01/16/17  05:50    


 


Calcium  9.5 mg/dL (8.6-10.3)   01/16/17  05:50    


 


Total Bilirubin  0.5 mg/dL (0.3-1.0)   01/13/17  06:16    


 


AST  14 U/L (13-39)   01/13/17  06:16    


 


ALT  9 U/L (7-52)   01/13/17  06:16    


 


Alkaline Phosphatase  73 U/L ()   01/13/17  06:16    


 


Total Protein  7.6 gm/dL (6.0-8.3)   01/13/17  06:16    


 


Albumin  4.2 gm/dL (4.2-5.5)   01/13/17  06:16    


 


Globulin  3.4 gm/dL  01/13/17  06:16    


 


Albumin/Globulin Ratio  1.2  (1.0-1.8)   01/13/17  06:16    


 


Lipase  13 U/L (11-82)   01/12/17  23:39    


 


TSH  0.20 uIU/ml (0.34-5.60)  L  01/13/17  06:16    


 


Urine Source  CLEAN C   01/14/17  21:35    


 


Urine Color  YELLOW   01/14/17  21:35    


 


Urine Clarity  CLEAR  (CLEAR)   01/14/17  21:35    


 


Urine pH  >=9.0   01/14/17  21:35    


 


Ur Specific Gravity  1.020  (1.005-1.030)   01/14/17  21:35    


 


Urine Protein  TRACE mg/dL (NEGATIVE)   01/14/17  21:35    


 


Urine Glucose (UA)  NEGATIVE mg/dL (NEGATIVE)   01/14/17  21:35    


 


Urine Ketones  NEGATIVE mg/dL (NEGATIVE)   01/14/17  21:35    


 


Urine Blood  NEGATIVE  (NEGATIVE)   01/14/17  21:35    


 


Urine Nitrate  NEGATIVE  (NEGATIVE)   01/14/17  21:35    


 


Urine Bilirubin  NEGATIVE  (NEGATIVE)   01/14/17  21:35    


 


Urine Urobilinogen  0.2 E.U./dL (0.2 - 1.0)   01/14/17  21:35    


 


Ur Leukocyte Esterase  NEGATIVE  (NEGATIVE)   01/14/17  21:35    


 


Urine RBC  0-2 /hpf (0-5)  H  01/14/17  21:35    


 


Urine WBC  0-2 /hpf (0-5)   01/14/17  21:35    


 


Ur Epithelial Cells  NONE SEEN /lpf (FEW)   01/14/17  21:35    


 


Urine Bacteria  FEW /hpf (NONE SEEN)   01/14/17  21:35    














- Physical Exam


Vitals and I&O: 


 Vital Signs











Temp  97.3 F   01/16/17 08:00


 


Pulse  62   01/16/17 08:00


 


Resp  19   01/16/17 08:00


 


BP  102/56   01/16/17 08:00


 


Pulse Ox  96   01/16/17 08:00








 Intake & Output











 01/15/17 01/16/17 01/16/17





 18:59 06:59 18:59


 


Intake Total 1200  


 


Balance 1200  


 


Intake:   


 


  Intake, IV Amount 1000  


 


    D5-0.45NS 1,000 ml @ 70 1000  





    mls/hr IV .[id number] [facility]   





    #:[id number]   


 


  Oral 200  


 


Other:   


 


  # Voids 3 3 


 


  Stool Characteristics   Soft











Active Medications: 


 Current Medications





Acetaminophen (Tylenol)  650 mg PO Q4HR PRN


   PRN Reason: Pain or Fever >101


   Stop: 03/13/17 23:50


Bisacodyl (Dulcolax 10 Mg Supp)  10 mg RC DAILY PRN


   PRN Reason: Constipation


   Stop: 03/13/17 23:52


Clonidine HCl (Catapres)  0.1 mg PO Q6HR PRN


   PRN Reason: SBP GREATER THAN 160


   Stop: 03/13/17 23:50


Dextrose/Sodium Chloride (D5-0.45ns)  1,000 mls @ 70 mls/hr IV .[id number] [facility]


   Stop: 03/15/17 21:46


   Last Admin: 01/16/17 03:48 Dose:  70 mls/hr


Magnesium Hydroxide (Milk Of Magnesia)  30 ml PO HS [facility]


   Stop: 03/14/17 20:59


   Last Admin: 01/15/17 22:52 Dose:  30 ml


Metoclopramide HCl (Reglan)  10 mg IVP Q8HR [facility]


   Stop: 03/17/17 04:59


   Last Admin: 01/16/17 05:46 Dose:  10 mg


Mineral Oil (Mineral Oil 30 Ml)  30 ml GT DAILY [facility]


   Stop: 03/15/17 08:59


   Last Admin: 01/16/17 09:39 Dose:  30 ml


Morphine Sulfate (Morphine)  2 mg IVP Q4HR PRN


   PRN Reason: Pain (Severe)


   Stop: 03/13/17 23:50


Ondansetron HCl (Zofran)  4 mg IV Q8H PRN


   PRN Reason: Nausea / Vomiting


   Stop: 03/13/17 23:50


Pantoprazole Sodium (Protonix)  40 mg IVP BID [facility]


   Stop: 03/14/17 08:59


   Last Admin: 01/16/17 09:39 Dose:  40 mg


Polyethylene Glycol (Miralax)  17 gm PO BID [facility]


   Stop: 03/17/17 08:59


   Last Admin: 01/16/17 09:39 Dose:  17 gm


Prochlorperazine (Compazine)  25 mg RC Q12H PRN; Protocol


   PRN Reason: Nausea


   Stop: 03/13/17 23:52


Vitamin D (Vitamin D)  400 iu GT DAILY [facility]


   Stop: 03/14/17 08:59


   Last Admin: 01/16/17 09:39 Dose:  400 iu








General: alert


HEENT: NC/AT, PERRLA


Neck: Supple


Lungs: CTAB


Cardiovascular: RRR, Normal S1, Normal S2, without murmur


Abdomen: soft non-tender, positive bowel sound





- Procedures


Procedures: 


 Procedures











Procedure Code Date


 


EGD BIOPSY SINGLE/MULTIPLE 06319 07/10/14


 


ESOPHAGOGASTRODUODENOSCOPY [EGD] W/CLOSED BIOPSY 45.16 07/10/14


 


INFLUENZA VACCINATION 99.52 01/30/14


 


INSERT INDWELLING CATH 57.94 12/03/12


 


INSERT INTESTINAL TUBE 96.08 12/03/12


 


INSERT PICC CATH [id number] 12/03/12


 


INSERT TEMP BLADDER CATH [id number] 12/03/12


 


OPEN AND OTHER CECECTOMY 45.72 12/03/12


 


OPEN AND OTHER RIGHT HEMICOLECTOMY 45.73 12/03/12


 


PARTIAL REMOVAL OF COLON 57415 12/03/12


 


UNLISTED PX SMALL INTESTINE 04239 12/03/12


 


VENOUS CATHETERIZATION NEC 38.93 12/03/12














Internal Medicine Assmt/Plan





- Assessment


Assessment: 





abdominal pain


 intractable vomiting- resolved








- Plan


Plan: 


ivf for hydration


awaiting for results for abdominal US results


zofran prn


monitor electrolytes


cbc/bmp in am

## 2017-01-16 NOTE — DIAGNOSTIC IMAGING REPORT
CT scan abdomen and pelvis without intravenous contrast



HISTORY: Nausea, vomiting



The liver exhibits a homogeneous parenchyma. No focal lesions. The

spleen appears normal. The suboptimal delineation the margins of the

pancreas. No focal abnormalities. Gastrostomy tube noted. No significant

focal renal lesions.



The exam of the pelvis demonstrate a markedly distended stool and

air-filled rectum and lower sigmoid colon. Additional dilated loops of

large bowel noted. There is poor delineation of the small bowel wall

margins due to a paucity of intra-abdominal fat. No obvious abnormal

masses or fluid collections.



IMPRESSION:

1. Markedly distended stool-filled air-filled rectum associated with

generalized dilatation of the colon. Suggestion of dilated loops of

small bowel. However, bowel wall margins are poorly defined due to a

paucity of intra-abdominal fat. Colonic and bowel dilatation is less

pronounced compared to a prior CT scan of December 2, 2012. Findings may

be at least partially related to fecal impaction along with a chronic

etiology. Clinical correlation is needed.

## 2017-01-16 NOTE — DIAGNOSTIC IMAGING REPORT
Abdominal ultrasound



HISTORY: Nausea, vomiting



Exam is very limited due to considerable bowel gas.



There is incomplete visualization the liver with no definite focal

lesions. No definite intraluminal abnormality seen within the

gallbladder. The common bile duct is difficult to visualized, but

appears to be somewhat generous in caliber (9 mm). Findings should be

correlated clinically and with laboratory data. The pancreas cannot be

seen. There is poor visualization of the right kidney with no obvious

focal lesions or hydronephrosis. The left kidney and spleen cannot be

seen.



IMPRESSION:

1. Very Limited/suboptimal exam with incomplete visualization

particularly of the left abdomen and liver.

2. Questionable dilatation of the common bile duct (9 mm). The findings

should be correlated clinically and with laboratory data.

## 2017-01-17 LAB
ANION GAP SERPL CALC-SCNC: 19 MMOL/L (ref 7–16)
BASOPHILS NFR BLD: 1 % (ref 0–3)
BUN SERPL-MCNC: 15 MG/DL (ref 7–25)
BUN/CREAT SERPL: 25
CALCIUM SERPL-MCNC: 9.6 MG/DL (ref 8.6–10.3)
CHLORIDE SERPL-SCNC: 108 MEQ/L (ref 98–107)
CO2 SERPL-SCNC: 18.7 MEQ/L (ref 21–31)
CREAT SERPL-MCNC: 0.6 MG/DL (ref 0.7–1.3)
EOSINOPHIL NFR BLD: 2 % (ref 0–5)
ERYTHROCYTE [DISTWIDTH] IN BLOOD BY AUTOMATED COUNT: 12.4 % (ref 11.5–20)
GLUCOSE SERPL-MCNC: 95 MG/DL (ref 70–105)
HCT VFR BLD CALC: 44.6 % (ref 39–49)
HGB BLD-MCNC: 14.7 GM/DL (ref 13.2–17.3)
MCH RBC QN AUTO: 29.1 PG (ref 26–30)
MCHC RBC AUTO-ENTMCNC: 33 PG (ref 28–36)
MCV RBC AUTO: 88.3 FL (ref 80–99)
NEUTROPHILS NFR BLD AUTO: 42 % (ref 40–80)
PLAT MORPH BLD: NORMAL
PLATELET # BLD EST: ADEQUATE 10*3/UL
PLATELET # BLD: 154 TH/CMM (ref 150–400)
PMV BLD AUTO: 8.6 FL
POTASSIUM SERPL-SCNC: 4.7 MEQ/L (ref 3.5–5.1)
RBC # BLD AUTO: 5.05 MIL/CMM (ref 4.3–5.7)
RBC MORPH BLD: NORMAL
SODIUM SERPL-SCNC: 141 MEQ/L (ref 136–145)
TOTAL CELLS COUNTED BLD: 100
WBC # BLD AUTO: 7.8 TH/CMM (ref 4.8–10.8)

## 2017-01-17 RX ADMIN — MAGNESIUM HYDROXIDE SCH ML: 400 SUSPENSION ORAL at 21:54

## 2017-01-17 RX ADMIN — POLYETHYLENE GLYCOL 3350 SCH GM: 17 POWDER, FOR SOLUTION ORAL at 18:44

## 2017-01-17 RX ADMIN — METOCLOPRAMIDE SCH MG: 5 INJECTION, SOLUTION INTRAMUSCULAR; INTRAVENOUS at 05:52

## 2017-01-17 RX ADMIN — METOCLOPRAMIDE SCH MG: 5 INJECTION, SOLUTION INTRAMUSCULAR; INTRAVENOUS at 14:20

## 2017-01-17 RX ADMIN — METOCLOPRAMIDE SCH MG: 5 INJECTION, SOLUTION INTRAMUSCULAR; INTRAVENOUS at 21:54

## 2017-01-17 RX ADMIN — POLYETHYLENE GLYCOL 3350 SCH GM: 17 POWDER, FOR SOLUTION ORAL at 08:28

## 2017-01-17 NOTE — INTERNAL MEDICINE PROG NOTE
Internal Medicine Subjective





- Subjective


Patient seen and examined:: with staff, chart reviewed, other (family at bedside

)


Patient is:: awake, non-verbal, in bed


Patient Complaints of:: congestion


Per staff patient is:: no adverse event, confused





Internal Medicine Objective





- Results


Result Diagrams: 


 01/17/17 05:45





 01/17/17 05:45


Recent Labs: 


 Laboratory Last Values











WBC  7.8 Th/cmm (4.8-10.8)   01/17/17  05:45    


 


RBC  5.05 Mil/cmm (4.30-5.70)   01/17/17  05:45    


 


Hgb  14.7 gm/dL (13.2-17.3)   01/17/17  05:45    


 


Hct  44.6 % (39.0-49.0)   01/17/17  05:45    


 


MCV  88.3 fl (80-99)   01/17/17  05:45    


 


MCH  29.1 pg (26.0-30.0)   01/17/17  05:45    


 


MCHC Differential  33.0 pg (28.0-36.0)   01/17/17  05:45    


 


RDW  12.4 % (11.5-20.0)   01/17/17  05:45    


 


Plt Count  154 Th/cmm (150-400)   01/17/17  05:45    


 


MPV  8.6 fl  01/17/17  05:45    


 


Neutrophils %  51.9 % (40.0-80.0)   01/16/17  05:50    


 


Band Neutrophils %  5 % (0-10)   01/13/17  06:16    


 


Lymphocytes %  33.4 % (20.0-50.0)   01/16/17  05:50    


 


Monocytes %  9.0 % (2.0-10.0)   01/16/17  05:50    


 


Eosinophils %  5.5 % (0.0-5.0)  H  01/16/17  05:50    


 


Basophils %  0.2 % (0.0-2.0)   01/16/17  05:50    


 


Neutrophils (Manual)  42 % (40-80)   01/17/17  05:45    


 


Lymphocytes  36 % (20-50)   01/17/17  05:45    


 


Monocytes  14 % (2-10)  H  01/17/17  05:45    


 


Eosinophils  2 % (0-5)   01/17/17  05:45    


 


Basophils  1 % (0-3)   01/17/17  05:45    


 


Atypical Lymphocytes  5 %  01/17/17  05:45    


 


Platelet Estimate  ADEQUATE  (NORMAL)   01/17/17  05:45    


 


Platelet Morphology  NORMAL  (NORMAL)   01/17/17  05:45    


 


RBC Morph Micro Appear  NORMAL  (NORMAL)   01/17/17  05:45    


 


Sodium  141 mEq/L (136-145)   01/17/17  05:45    


 


Potassium  4.7 mEq/L (3.5-5.1)   01/17/17  05:45    


 


Chloride  108 mEq/L ()  H  01/17/17  05:45    


 


Carbon Dioxide  18.7 mEq/L (21.0-31.0)  L  01/17/17  05:45    


 


Anion Gap  19.0  (7.0-16.0)  H  01/17/17  05:45    


 


BUN  15 mg/dL (7-25)   01/17/17  05:45    


 


Creatinine  0.6 mg/dL (0.7-1.3)  L  01/17/17  05:45    


 


Est GFR ( Amer)  > 60.0 ml/min  01/17/17  05:45    


 


Est GFR (Non-Af Amer)  > 60.0 ml/min  01/17/17  05:45    


 


BUN/Creatinine Ratio  25.0   01/17/17  05:45    


 


Glucose  95 mg/dL ()   01/17/17  05:45    


 


Calcium  9.6 mg/dL (8.6-10.3)   01/17/17  05:45    


 


Total Bilirubin  0.5 mg/dL (0.3-1.0)   01/13/17  06:16    


 


AST  14 U/L (13-39)   01/13/17  06:16    


 


ALT  9 U/L (7-52)   01/13/17  06:16    


 


Alkaline Phosphatase  73 U/L ()   01/13/17  06:16    


 


Total Protein  7.6 gm/dL (6.0-8.3)   01/13/17  06:16    


 


Albumin  4.2 gm/dL (4.2-5.5)   01/13/17  06:16    


 


Globulin  3.4 gm/dL  01/13/17  06:16    


 


Albumin/Globulin Ratio  1.2  (1.0-1.8)   01/13/17  06:16    


 


Lipase  13 U/L (11-82)   01/12/17  23:39    


 


TSH  0.20 uIU/ml (0.34-5.60)  L  01/13/17  06:16    


 


Urine Source  CLEAN C   01/14/17  21:35    


 


Urine Color  YELLOW   01/14/17  21:35    


 


Urine Clarity  CLEAR  (CLEAR)   01/14/17  21:35    


 


Urine pH  >=9.0   01/14/17  21:35    


 


Ur Specific Gravity  1.020  (1.005-1.030)   01/14/17  21:35    


 


Urine Protein  TRACE mg/dL (NEGATIVE)   01/14/17  21:35    


 


Urine Glucose (UA)  NEGATIVE mg/dL (NEGATIVE)   01/14/17  21:35    


 


Urine Ketones  NEGATIVE mg/dL (NEGATIVE)   01/14/17  21:35    


 


Urine Blood  NEGATIVE  (NEGATIVE)   01/14/17  21:35    


 


Urine Nitrate  NEGATIVE  (NEGATIVE)   01/14/17  21:35    


 


Urine Bilirubin  NEGATIVE  (NEGATIVE)   01/14/17  21:35    


 


Urine Urobilinogen  0.2 E.U./dL (0.2 - 1.0)   01/14/17  21:35    


 


Ur Leukocyte Esterase  NEGATIVE  (NEGATIVE)   01/14/17  21:35    


 


Urine RBC  0-2 /hpf (0-5)  H  01/14/17  21:35    


 


Urine WBC  0-2 /hpf (0-5)   01/14/17  21:35    


 


Ur Epithelial Cells  NONE SEEN /lpf (FEW)   01/14/17  21:35    


 


Urine Bacteria  FEW /hpf (NONE SEEN)   01/14/17  21:35    














- Physical Exam


Vitals and I&O: 


 Vital Signs











Temp  97.1 F   01/17/17 12:00


 


Pulse  67   01/17/17 12:00


 


Resp  17   01/17/17 12:00


 


BP  106/78   01/17/17 12:00


 


Pulse Ox  97   01/17/17 12:00








 Intake & Output











 01/16/17 01/17/17 01/17/17





 18:59 06:59 18:59


 


Intake Total  300 


 


Balance  300 


 


Intake:   


 


  Tube Feeding  300 


 


Other:   


 


  # Voids  3 


 


  Stool Characteristics Soft Soft Liquid











Active Medications: 


 Current Medications





Acetaminophen (Tylenol)  650 mg PO Q4HR PRN


   PRN Reason: Pain or Fever >101


   Stop: 03/13/17 23:50


Bisacodyl (Dulcolax 10 Mg Supp)  10 mg RC DAILY PRN


   PRN Reason: Constipation


   Stop: 03/13/17 23:52


Clonidine HCl (Catapres)  0.1 mg PO Q6HR PRN


   PRN Reason: SBP GREATER THAN 160


   Stop: 03/13/17 23:50


Dextrose/Sodium Chloride (D5-0.45ns)  1,000 mls @ 70 mls/hr IV .[id number] [facility]


   Stop: 03/15/17 21:46


   Last Admin: 01/16/17 03:48 Dose:  70 mls/hr


Magnesium Hydroxide (Milk Of Magnesia)  30 ml PO HS [facility]


   Stop: 03/14/17 20:59


   Last Admin: 01/16/17 22:36 Dose:  30 ml


Metoclopramide HCl (Reglan)  10 mg IVP Q8HR [facility]


   Stop: 03/17/17 04:59


   Last Admin: 01/17/17 14:20 Dose:  10 mg


Mineral Oil (Mineral Oil 30 Ml)  30 ml GT DAILY [facility]


   Stop: 03/15/17 08:59


   Last Admin: 01/17/17 08:28 Dose:  30 ml


Morphine Sulfate (Morphine)  2 mg IVP Q4HR PRN


   PRN Reason: Pain (Severe)


   Stop: 03/13/17 23:50


Ondansetron HCl (Zofran)  4 mg IV Q8H PRN


   PRN Reason: Nausea / Vomiting


   Stop: 03/13/17 23:50


Pantoprazole Sodium (Protonix)  40 mg IVP BID [facility]


   Stop: 03/14/17 08:59


   Last Admin: 01/17/17 16:15 Dose:  40 mg


Polyethylene Glycol (Miralax)  17 gm PO BID [facility]


   Stop: 03/17/17 08:59


   Last Admin: 01/17/17 08:28 Dose:  17 gm


Prochlorperazine (Compazine)  25 mg RC Q12H PRN; Protocol


   PRN Reason: Nausea


   Stop: 03/13/17 23:52


Vitamin D (Vitamin D)  400 iu GT DAILY [facility]


   Stop: 03/14/17 08:59


   Last Admin: 01/17/17 08:29 Dose:  400 iu








General: lethargic, demented


HEENT: NC/AT, PERRLA


Neck: Supple


Lungs: CTAB


Cardiovascular: RRR, Normal S1, Normal S2


Abdomen: soft non-tender, thin, +GT


Extremities: excoriation, contracture


Neurological: no change





- Procedures


Procedures: 


 Procedures











Procedure Code Date


 


EGD BIOPSY SINGLE/MULTIPLE 69434 07/10/14


 


ESOPHAGOGASTRODUODENOSCOPY [EGD] W/CLOSED BIOPSY 45.16 07/10/14


 


INFLUENZA VACCINATION 99.52 01/30/14


 


INSERT INDWELLING CATH 57.94 12/03/12


 


INSERT INTESTINAL TUBE 96.08 12/03/12


 


INSERT PICC CATH [id number] 12/03/12


 


INSERT TEMP BLADDER CATH [id number] 12/03/12


 


OPEN AND OTHER CECECTOMY 45.72 12/03/12


 


OPEN AND OTHER RIGHT HEMICOLECTOMY 45.73 12/03/12


 


PARTIAL REMOVAL OF COLON 39551 12/03/12


 


UNLISTED PX SMALL INTESTINE 28384 12/03/12


 


VENOUS CATHETERIZATION NEC 38.93 12/03/12














Internal Medicine Assmt/Plan





- Assessment


Assessment: 





abdominal pain


 intractable vomiting


dehyration


cp








- Plan


Plan: 





cont on ivf


on reglan 


on ppi


robert rn 


ge follow up

## 2017-01-18 LAB
ALBUMIN/GLOB SERPL: 1.2 {RATIO} (ref 1–1.8)
ALP SERPL-CCNC: 65 U/L (ref 34–104)
ALT SERPL-CCNC: 15 U/L (ref 7–52)
ANION GAP SERPL CALC-SCNC: 8.5 MMOL/L (ref 7–16)
AST SERPL-CCNC: 23 U/L (ref 13–39)
BILIRUB SERPL-MCNC: 0.4 MG/DL (ref 0.3–1)
BUN SERPL-MCNC: 14 MG/DL (ref 7–25)
BUN/CREAT SERPL: 28
CALCIUM SERPL-MCNC: 9.4 MG/DL (ref 8.6–10.3)
CHLORIDE SERPL-SCNC: 104 MEQ/L (ref 98–107)
CO2 SERPL-SCNC: 30.1 MEQ/L (ref 21–31)
CREAT SERPL-MCNC: 0.5 MG/DL (ref 0.7–1.3)
GLOBULIN SER-MCNC: 3.4 GM/DL
GLUCOSE SERPL-MCNC: 94 MG/DL (ref 70–105)
POTASSIUM SERPL-SCNC: 4.6 MEQ/L (ref 3.5–5.1)
SODIUM SERPL-SCNC: 138 MEQ/L (ref 136–145)

## 2017-01-18 RX ADMIN — METOCLOPRAMIDE SCH MG: 5 INJECTION, SOLUTION INTRAMUSCULAR; INTRAVENOUS at 21:22

## 2017-01-18 RX ADMIN — MAGNESIUM HYDROXIDE SCH ML: 400 SUSPENSION ORAL at 21:23

## 2017-01-18 RX ADMIN — METOCLOPRAMIDE SCH MG: 5 INJECTION, SOLUTION INTRAMUSCULAR; INTRAVENOUS at 12:57

## 2017-01-18 RX ADMIN — POLYETHYLENE GLYCOL 3350 SCH: 17 POWDER, FOR SOLUTION ORAL at 08:37

## 2017-01-18 RX ADMIN — METOCLOPRAMIDE SCH MG: 5 INJECTION, SOLUTION INTRAMUSCULAR; INTRAVENOUS at 06:03

## 2017-01-18 RX ADMIN — POLYETHYLENE GLYCOL 3350 SCH GM: 17 POWDER, FOR SOLUTION ORAL at 16:27

## 2017-01-18 NOTE — DIAGNOSTIC IMAGING REPORT
KUB



History: Nausea and vomiting



Comparison: CT abdomen and pelvis on 1/16/2017 showing severe distal

fecal impaction



Findings:



Percutaneous feeding tube is noted. Diffuse distended loops of small and

large bowel are noted.



IMPRESSION:



Persistent diffuse distended loops of small and large bowel. Findings

may be related patient's distal fecal impaction better seen on recent CT

examination. Clinical correlation recommended.

## 2017-01-18 NOTE — INTERNAL MEDICINE PROG NOTE
Internal Medicine Subjective





- Subjective


Service Date: 01/18/17


Patient seen and examined:: with staff


Patient is:: awake


Per staff patient is:: no adverse event





Internal Medicine Objective





- Results


Result Diagrams: 


 01/17/17 05:45





 01/18/17 06:55


Recent Labs: 


 Laboratory Last Values











WBC  7.8 Th/cmm (4.8-10.8)   01/17/17  05:45    


 


RBC  5.05 Mil/cmm (4.30-5.70)   01/17/17  05:45    


 


Hgb  14.7 gm/dL (13.2-17.3)   01/17/17  05:45    


 


Hct  44.6 % (39.0-49.0)   01/17/17  05:45    


 


MCV  88.3 fl (80-99)   01/17/17  05:45    


 


MCH  29.1 pg (26.0-30.0)   01/17/17  05:45    


 


MCHC Differential  33.0 pg (28.0-36.0)   01/17/17  05:45    


 


RDW  12.4 % (11.5-20.0)   01/17/17  05:45    


 


Plt Count  154 Th/cmm (150-400)   01/17/17  05:45    


 


MPV  8.6 fl  01/17/17  05:45    


 


Neutrophils %  51.9 % (40.0-80.0)   01/16/17  05:50    


 


Band Neutrophils %  5 % (0-10)   01/13/17  06:16    


 


Lymphocytes %  33.4 % (20.0-50.0)   01/16/17  05:50    


 


Monocytes %  9.0 % (2.0-10.0)   01/16/17  05:50    


 


Eosinophils %  5.5 % (0.0-5.0)  H  01/16/17  05:50    


 


Basophils %  0.2 % (0.0-2.0)   01/16/17  05:50    


 


Neutrophils (Manual)  42 % (40-80)   01/17/17  05:45    


 


Lymphocytes  36 % (20-50)   01/17/17  05:45    


 


Monocytes  14 % (2-10)  H  01/17/17  05:45    


 


Eosinophils  2 % (0-5)   01/17/17  05:45    


 


Basophils  1 % (0-3)   01/17/17  05:45    


 


Atypical Lymphocytes  5 %  01/17/17  05:45    


 


Platelet Estimate  ADEQUATE  (NORMAL)   01/17/17  05:45    


 


Platelet Morphology  NORMAL  (NORMAL)   01/17/17  05:45    


 


RBC Morph Micro Appear  NORMAL  (NORMAL)   01/17/17  05:45    


 


Sodium  138 mEq/L (136-145)   01/18/17  06:55    


 


Potassium  4.6 mEq/L (3.5-5.1)   01/18/17  06:55    


 


Chloride  104 mEq/L ()   01/18/17  06:55    


 


Carbon Dioxide  30.1 mEq/L (21.0-31.0)   01/18/17  06:55    


 


Anion Gap  8.5  (7.0-16.0)   01/18/17  06:55    


 


BUN  14 mg/dL (7-25)   01/18/17  06:55    


 


Creatinine  0.5 mg/dL (0.7-1.3)  L  01/18/17  06:55    


 


Est GFR ( Amer)  > 60.0 ml/min  01/18/17  06:55    


 


Est GFR (Non-Af Amer)  > 60.0 ml/min  01/18/17  06:55    


 


BUN/Creatinine Ratio  28.0   01/18/17  06:55    


 


Glucose  94 mg/dL ()   01/18/17  06:55    


 


Calcium  9.4 mg/dL (8.6-10.3)   01/18/17  06:55    


 


Total Bilirubin  0.4 mg/dL (0.3-1.0)   01/18/17  06:55    


 


AST  23 U/L (13-39)   01/18/17  06:55    


 


ALT  15 U/L (7-52)   01/18/17  06:55    


 


Alkaline Phosphatase  65 U/L ()   01/18/17  06:55    


 


Total Protein  7.6 gm/dL (6.0-8.3)   01/18/17  06:55    


 


Albumin  4.2 gm/dL (4.2-5.5)   01/18/17  06:55    


 


Globulin  3.4 gm/dL  01/18/17  06:55    


 


Albumin/Globulin Ratio  1.2  (1.0-1.8)   01/18/17  06:55    


 


Lipase  13 U/L (11-82)   01/12/17  23:39    


 


TSH  0.20 uIU/ml (0.34-5.60)  L  01/13/17  06:16    


 


Urine Source  CLEAN C   01/14/17  21:35    


 


Urine Color  YELLOW   01/14/17  21:35    


 


Urine Clarity  CLEAR  (CLEAR)   01/14/17  21:35    


 


Urine pH  >=9.0   01/14/17  21:35    


 


Ur Specific Gravity  1.020  (1.005-1.030)   01/14/17  21:35    


 


Urine Protein  TRACE mg/dL (NEGATIVE)   01/14/17  21:35    


 


Urine Glucose (UA)  NEGATIVE mg/dL (NEGATIVE)   01/14/17  21:35    


 


Urine Ketones  NEGATIVE mg/dL (NEGATIVE)   01/14/17  21:35    


 


Urine Blood  NEGATIVE  (NEGATIVE)   01/14/17  21:35    


 


Urine Nitrate  NEGATIVE  (NEGATIVE)   01/14/17  21:35    


 


Urine Bilirubin  NEGATIVE  (NEGATIVE)   01/14/17  21:35    


 


Urine Urobilinogen  0.2 E.U./dL (0.2 - 1.0)   01/14/17  21:35    


 


Ur Leukocyte Esterase  NEGATIVE  (NEGATIVE)   01/14/17  21:35    


 


Urine RBC  0-2 /hpf (0-5)  H  01/14/17  21:35    


 


Urine WBC  0-2 /hpf (0-5)   01/14/17  21:35    


 


Ur Epithelial Cells  NONE SEEN /lpf (FEW)   01/14/17  21:35    


 


Urine Bacteria  FEW /hpf (NONE SEEN)   01/14/17  21:35    














- Physical Exam


Vitals and I&O: 


 Vital Signs











Temp  98.6 F   01/18/17 12:10


 


Pulse  93   01/18/17 12:10


 


Resp  18   01/18/17 12:10


 


BP  117/77   01/18/17 12:10


 


Pulse Ox  97   01/18/17 12:10








 Intake & Output











 01/17/17 01/18/17 01/18/17





 18:59 06:59 18:59


 


Output Total  1 


 


Balance  -1 


 


Output:   


 


  Emesis  1 


 


Other:   


 


  # Voids  3 


 


  # Bowel Movements  3 


 


  Stool Characteristics Liquid Liquid 











Active Medications: 


 Current Medications





Acetaminophen (Tylenol)  650 mg PO Q4HR PRN


   PRN Reason: Pain or Fever >101


   Stop: 03/13/17 23:50


Bisacodyl (Dulcolax 10 Mg Supp)  10 mg RC DAILY PRN


   PRN Reason: Constipation


   Stop: 03/13/17 23:52


Clonidine HCl (Catapres)  0.1 mg PO Q6HR PRN


   PRN Reason: SBP GREATER THAN 160


   Stop: 03/13/17 23:50


Dextrose/Sodium Chloride (D5-0.45ns)  1,000 mls @ 70 mls/hr IV .[id number] [facility]


   Stop: 03/15/17 21:46


   Last Admin: 01/16/17 03:48 Dose:  70 mls/hr


Magnesium Hydroxide (Milk Of Magnesia)  30 ml PO HS [facility]


   Stop: 03/14/17 20:59


   Last Admin: 01/17/17 21:54 Dose:  30 ml


Metoclopramide HCl (Reglan)  10 mg IVP Q8HR [facility]


   Stop: 03/17/17 04:59


   Last Admin: 01/18/17 12:57 Dose:  10 mg


Mineral Oil (Mineral Oil 30 Ml)  30 ml GT DAILY [facility]


   Stop: 03/15/17 08:59


   Last Admin: 01/18/17 08:37 Dose:  Not Given


Morphine Sulfate (Morphine)  2 mg IVP Q4HR PRN


   PRN Reason: Pain (Severe)


   Stop: 03/13/17 23:50


Ondansetron HCl (Zofran)  4 mg IV Q8H PRN


   PRN Reason: Nausea / Vomiting


   Stop: 03/13/17 23:50


Pantoprazole Sodium (Protonix)  40 mg IVP BID [facility]


   Stop: 03/14/17 08:59


   Last Admin: 01/18/17 08:37 Dose:  40 mg


Polyethylene Glycol (Miralax)  17 gm PO BID [facility]


   Stop: 03/17/17 08:59


   Last Admin: 01/18/17 08:37 Dose:  Not Given


Prochlorperazine (Compazine)  25 mg RC Q12H PRN; Protocol


   PRN Reason: Nausea


   Stop: 03/13/17 23:52


Vitamin D (Vitamin D)  400 iu GT DAILY [facility]


   Stop: 03/14/17 08:59


   Last Admin: 01/18/17 08:38 Dose:  Not Given








General: alert


Neck: Supple


Lungs: CTAB


Cardiovascular: RRR, Normal S1, Normal S2, without murmur


Abdomen: soft non-tender


Neurological: no change





- Procedures


Procedures: 


 Procedures











Procedure Code Date


 


EGD BIOPSY SINGLE/MULTIPLE 17658 07/10/14


 


ESOPHAGOGASTRODUODENOSCOPY [EGD] W/CLOSED BIOPSY 45.16 07/10/14


 


INFLUENZA VACCINATION 99.52 01/30/14


 


INSERT INDWELLING CATH 57.94 12/03/12


 


INSERT INTESTINAL TUBE 96.08 12/03/12


 


INSERT PICC CATH [id number] 12/03/12


 


INSERT TEMP BLADDER CATH [id number] 12/03/12


 


OPEN AND OTHER CECECTOMY 45.72 12/03/12


 


OPEN AND OTHER RIGHT HEMICOLECTOMY 45.73 12/03/12


 


PARTIAL REMOVAL OF COLON 88299 12/03/12


 


UNLISTED PX SMALL INTESTINE 21360 12/03/12


 


VENOUS CATHETERIZATION NEC 38.93 12/03/12














Internal Medicine Assmt/Plan





- Assessment


Assessment: 





abdominal pain


 intractable vomiting- resolved








- Plan


Plan: 


ivf for hydration


zofran prn


monitor electrolytes


cbc/bmp in am

## 2017-01-19 LAB
ANION GAP SERPL CALC-SCNC: 7.6 MMOL/L (ref 7–16)
BASOPHILS NFR BLD AUTO: 0.1 % (ref 0–2)
BUN SERPL-MCNC: 15 MG/DL (ref 7–25)
BUN/CREAT SERPL: 30
CALCIUM SERPL-MCNC: 8.8 MG/DL (ref 8.6–10.3)
CHLORIDE SERPL-SCNC: 103 MEQ/L (ref 98–107)
CO2 SERPL-SCNC: 29.6 MEQ/L (ref 21–31)
CREAT SERPL-MCNC: 0.5 MG/DL (ref 0.7–1.3)
EOSINOPHIL NFR BLD AUTO: 5.9 % (ref 0–5)
ERYTHROCYTE [DISTWIDTH] IN BLOOD BY AUTOMATED COUNT: 12.4 % (ref 11.5–20)
GLUCOSE SERPL-MCNC: 100 MG/DL (ref 70–105)
HCT VFR BLD CALC: 37.9 % (ref 39–49)
HGB BLD-MCNC: 12.9 GM/DL (ref 13.2–17.3)
LYMPHOCYTES NFR BLD AUTO: 33.5 % (ref 20–50)
MCH RBC QN AUTO: 30.2 PG (ref 26–30)
MCHC RBC AUTO-ENTMCNC: 34.1 PG (ref 28–36)
MCV RBC AUTO: 88.6 FL (ref 80–99)
MONOCYTES NFR BLD AUTO: 9.2 % (ref 2–10)
NEUTROPHILS # BLD: 2.7 TH/CMM (ref 1.8–8)
NEUTROPHILS NFR BLD AUTO: 51.3 % (ref 40–80)
PLATELET # BLD: 156 TH/CMM (ref 150–400)
PMV BLD AUTO: 8.1 FL
POTASSIUM SERPL-SCNC: 4.2 MEQ/L (ref 3.5–5.1)
RBC # BLD AUTO: 4.27 MIL/CMM (ref 4.3–5.7)
SODIUM SERPL-SCNC: 136 MEQ/L (ref 136–145)
WBC # BLD AUTO: 5.2 TH/CMM (ref 4.8–10.8)

## 2017-01-19 RX ADMIN — METOCLOPRAMIDE SCH MG: 5 INJECTION, SOLUTION INTRAMUSCULAR; INTRAVENOUS at 22:22

## 2017-01-19 RX ADMIN — METOCLOPRAMIDE SCH MG: 5 INJECTION, SOLUTION INTRAMUSCULAR; INTRAVENOUS at 14:25

## 2017-01-19 RX ADMIN — METOCLOPRAMIDE SCH MG: 5 INJECTION, SOLUTION INTRAMUSCULAR; INTRAVENOUS at 05:32

## 2017-01-19 RX ADMIN — POLYETHYLENE GLYCOL 3350 SCH GM: 17 POWDER, FOR SOLUTION ORAL at 10:07

## 2017-01-19 RX ADMIN — MAGNESIUM HYDROXIDE SCH ML: 400 SUSPENSION ORAL at 22:22

## 2017-01-20 RX ADMIN — MAGNESIUM HYDROXIDE SCH ML: 400 SUSPENSION ORAL at 22:29

## 2017-01-20 NOTE — INTERNAL MEDICINE PROG NOTE
Internal Medicine Subjective





- Subjective


Service Date: 01/20/17


Patient seen and examined:: with staff


Patient is:: awake


Per staff patient is:: no adverse event





Internal Medicine Objective





- Results


Result Diagrams: 


 01/19/17 07:07





 01/19/17 07:07


Recent Labs: 


 Laboratory Last Values











WBC  5.2 Th/cmm (4.8-10.8)  D 01/19/17  07:07    


 


RBC  4.27 Mil/cmm (4.30-5.70)  L  01/19/17  07:07    


 


Hgb  12.9 gm/dL (13.2-17.3)  L  01/19/17  07:07    


 


Hct  37.9 % (39.0-49.0)  L D 01/19/17  07:07    


 


MCV  88.6 fl (80-99)   01/19/17  07:07    


 


MCH  30.2 pg (26.0-30.0)  H  01/19/17  07:07    


 


MCHC Differential  34.1 pg (28.0-36.0)   01/19/17  07:07    


 


RDW  12.4 % (11.5-20.0)   01/19/17  07:07    


 


Plt Count  156 Th/cmm (150-400)   01/19/17  07:07    


 


MPV  8.1 fl  01/19/17  07:07    


 


Neutrophils %  51.3 % (40.0-80.0)   01/19/17  07:07    


 


Band Neutrophils %  5 % (0-10)   01/13/17  06:16    


 


Lymphocytes %  33.5 % (20.0-50.0)   01/19/17  07:07    


 


Monocytes %  9.2 % (2.0-10.0)   01/19/17  07:07    


 


Eosinophils %  5.9 % (0.0-5.0)  H  01/19/17  07:07    


 


Basophils %  0.1 % (0.0-2.0)   01/19/17  07:07    


 


Neutrophils (Manual)  42 % (40-80)   01/17/17  05:45    


 


Lymphocytes  36 % (20-50)   01/17/17  05:45    


 


Monocytes  14 % (2-10)  H  01/17/17  05:45    


 


Eosinophils  2 % (0-5)   01/17/17  05:45    


 


Basophils  1 % (0-3)   01/17/17  05:45    


 


Atypical Lymphocytes  5 %  01/17/17  05:45    


 


Platelet Estimate  ADEQUATE  (NORMAL)   01/17/17  05:45    


 


Platelet Morphology  NORMAL  (NORMAL)   01/17/17  05:45    


 


RBC Morph Micro Appear  NORMAL  (NORMAL)   01/17/17  05:45    


 


Sodium  136 mEq/L (136-145)   01/19/17  07:07    


 


Potassium  4.2 mEq/L (3.5-5.1)   01/19/17  07:07    


 


Chloride  103 mEq/L ()   01/19/17  07:07    


 


Carbon Dioxide  29.6 mEq/L (21.0-31.0)   01/19/17  07:07    


 


Anion Gap  7.6  (7.0-16.0)   01/19/17  07:07    


 


BUN  15 mg/dL (7-25)   01/19/17  07:07    


 


Creatinine  0.5 mg/dL (0.7-1.3)  L  01/19/17  07:07    


 


Est GFR ( Amer)  > 60.0 ml/min  01/19/17  07:07    


 


Est GFR (Non-Af Amer)  > 60.0 ml/min  01/19/17  07:07    


 


BUN/Creatinine Ratio  30.0   01/19/17  07:07    


 


Glucose  100 mg/dL ()   01/19/17  07:07    


 


Calcium  8.8 mg/dL (8.6-10.3)   01/19/17  07:07    


 


Total Bilirubin  0.4 mg/dL (0.3-1.0)   01/18/17  06:55    


 


AST  23 U/L (13-39)   01/18/17  06:55    


 


ALT  15 U/L (7-52)   01/18/17  06:55    


 


Alkaline Phosphatase  65 U/L ()   01/18/17  06:55    


 


Total Protein  7.6 gm/dL (6.0-8.3)   01/18/17  06:55    


 


Albumin  4.2 gm/dL (4.2-5.5)   01/18/17  06:55    


 


Globulin  3.4 gm/dL  01/18/17  06:55    


 


Albumin/Globulin Ratio  1.2  (1.0-1.8)   01/18/17  06:55    


 


Lipase  13 U/L (11-82)   01/12/17  23:39    


 


TSH  0.20 uIU/ml (0.34-5.60)  L  01/13/17  06:16    


 


Urine Source  CLEAN C   01/14/17  21:35    


 


Urine Color  YELLOW   01/14/17  21:35    


 


Urine Clarity  CLEAR  (CLEAR)   01/14/17  21:35    


 


Urine pH  >=9.0   01/14/17  21:35    


 


Ur Specific Gravity  1.020  (1.005-1.030)   01/14/17  21:35    


 


Urine Protein  TRACE mg/dL (NEGATIVE)   01/14/17  21:35    


 


Urine Glucose (UA)  NEGATIVE mg/dL (NEGATIVE)   01/14/17  21:35    


 


Urine Ketones  NEGATIVE mg/dL (NEGATIVE)   01/14/17  21:35    


 


Urine Blood  NEGATIVE  (NEGATIVE)   01/14/17  21:35    


 


Urine Nitrate  NEGATIVE  (NEGATIVE)   01/14/17  21:35    


 


Urine Bilirubin  NEGATIVE  (NEGATIVE)   01/14/17  21:35    


 


Urine Urobilinogen  0.2 E.U./dL (0.2 - 1.0)   01/14/17  21:35    


 


Ur Leukocyte Esterase  NEGATIVE  (NEGATIVE)   01/14/17  21:35    


 


Urine RBC  0-2 /hpf (0-5)  H  01/14/17  21:35    


 


Urine WBC  0-2 /hpf (0-5)   01/14/17  21:35    


 


Ur Epithelial Cells  NONE SEEN /lpf (FEW)   01/14/17  21:35    


 


Urine Bacteria  FEW /hpf (NONE SEEN)   01/14/17  21:35    














- Physical Exam


Vitals and I&O: 


 Vital Signs











Temp  97.1 F   01/20/17 08:00


 


Pulse  65   01/20/17 08:00


 


Resp  18   01/20/17 08:00


 


BP  108/83   01/20/17 08:00


 


Pulse Ox  98   01/20/17 08:00








 Intake & Output











 01/19/17 01/20/17 01/20/17





 18:59 06:59 18:59


 


Intake Total 700 120 


 


Balance 700 120 


 


Intake:   


 


  Oral 400 120 


 


  Other 300  


 


Other:   


 


  # Voids 3  


 


  # Bowel Movements 2  











Active Medications: 


 Current Medications





Bisacodyl (Dulcolax 10 Mg Supp)  10 mg RC DAILY PRN


   PRN Reason: Constipation


   Stop: 03/13/17 23:52


Dextrose/Sodium Chloride (D5-0.45ns)  1,000 mls @ 70 mls/hr IV .[id number] [facility]


   Stop: 03/21/17 12:29


Magnesium Hydroxide (Milk Of Magnesia)  30 ml PO HS [facility]


   Stop: 03/14/17 20:59


   Last Admin: 01/19/17 22:22 Dose:  30 ml


Metoclopramide HCl (Reglan)  10 mg IVP Q8HR [facility]


   Stop: 03/17/17 04:59


   Last Admin: 01/19/17 22:22 Dose:  10 mg


Mineral Oil (Mineral Oil 30 Ml)  30 ml GT DAILY [facility]


   Stop: 03/15/17 08:59


   Last Admin: 01/20/17 10:04 Dose:  30 ml


Prochlorperazine (Compazine)  25 mg RC Q12H PRN; Protocol


   PRN Reason: Nausea


   Stop: 03/13/17 23:52


Vitamin D (Vitamin D)  400 iu GT DAILY [facility]


   Stop: 03/14/17 08:59


   Last Admin: 01/20/17 10:04 Dose:  400 iu








General: alert


HEENT: NC/AT


Neck: Supple


Lungs: CTAB


Cardiovascular: RRR, Normal S1, Normal S2, without murmur


Abdomen: soft non-tender, non-distended, positive bowel sound


Neurological: no change





- Procedures


Procedures: 


 Procedures











Procedure Code Date


 


EGD BIOPSY SINGLE/MULTIPLE 35808 07/10/14


 


ESOPHAGOGASTRODUODENOSCOPY [EGD] W/CLOSED BIOPSY 45.16 07/10/14


 


INFLUENZA VACCINATION 99.52 01/30/14


 


INSERT INDWELLING CATH 57.94 12/03/12


 


INSERT INTESTINAL TUBE 96.08 12/03/12


 


INSERT PICC CATH [id number] 12/03/12


 


INSERT TEMP BLADDER CATH [id number] 12/03/12


 


OPEN AND OTHER CECECTOMY 45.72 12/03/12


 


OPEN AND OTHER RIGHT HEMICOLECTOMY 45.73 12/03/12


 


PARTIAL REMOVAL OF COLON 95459 12/03/12


 


UNLISTED PX SMALL INTESTINE 25103 12/03/12


 


VENOUS CATHETERIZATION NEC 38.93 12/03/12














Internal Medicine Assmt/Plan





- Assessment


Assessment: 





abdominal pain


 intractable vomiting- resolved








- Plan


Plan: 


ivf for hydration


placement issues 


zofran prn


monitor electrolytes


cbc/bmp in am

## 2017-01-21 LAB
ANION GAP SERPL CALC-SCNC: 10.3 MMOL/L (ref 7–16)
BASOPHILS NFR BLD AUTO: 1.7 % (ref 0–2)
BUN SERPL-MCNC: 9 MG/DL (ref 7–25)
BUN/CREAT SERPL: 22.5
CALCIUM SERPL-MCNC: 9.3 MG/DL (ref 8.6–10.3)
CHLORIDE SERPL-SCNC: 101 MEQ/L (ref 98–107)
CO2 SERPL-SCNC: 25.6 MEQ/L (ref 21–31)
CREAT SERPL-MCNC: 0.4 MG/DL (ref 0.7–1.3)
EOSINOPHIL NFR BLD AUTO: 5.3 % (ref 0–5)
ERYTHROCYTE [DISTWIDTH] IN BLOOD BY AUTOMATED COUNT: 12.5 % (ref 11.5–20)
GLUCOSE SERPL-MCNC: 103 MG/DL (ref 70–105)
HCT VFR BLD CALC: 39.8 % (ref 39–49)
HGB BLD-MCNC: 13.5 GM/DL (ref 13.2–17.3)
LYMPHOCYTES NFR BLD AUTO: 32.8 % (ref 20–50)
MCH RBC QN AUTO: 29.6 PG (ref 26–30)
MCHC RBC AUTO-ENTMCNC: 33.9 PG (ref 28–36)
MCV RBC AUTO: 87.2 FL (ref 80–99)
MONOCYTES NFR BLD AUTO: 10.8 % (ref 2–10)
NEUTROPHILS # BLD: 3.3 TH/CMM (ref 1.8–8)
NEUTROPHILS NFR BLD AUTO: 49.4 % (ref 40–80)
PLATELET # BLD: 180 TH/CMM (ref 150–400)
PMV BLD AUTO: 8 FL
POTASSIUM SERPL-SCNC: 3.9 MEQ/L (ref 3.5–5.1)
RBC # BLD AUTO: 4.56 MIL/CMM (ref 4.3–5.7)
SODIUM SERPL-SCNC: 133 MEQ/L (ref 136–145)
WBC # BLD AUTO: 6.7 TH/CMM (ref 4.8–10.8)

## 2017-01-21 RX ADMIN — MAGNESIUM HYDROXIDE SCH ML: 400 SUSPENSION ORAL at 21:24

## 2017-01-21 RX ADMIN — DEXTROSE AND SODIUM CHLORIDE SCH MLS/HR: 5; .45 INJECTION, SOLUTION INTRAVENOUS at 21:32

## 2017-01-21 RX ADMIN — DEXTROSE AND SODIUM CHLORIDE SCH MLS/HR: 5; .45 INJECTION, SOLUTION INTRAVENOUS at 08:25

## 2017-01-21 NOTE — INTERNAL MEDICINE PROG NOTE
Internal Medicine Subjective





- Subjective


Patient seen and examined:: with staff, chart reviewed


Patient is:: asleep, non-verbal, non-interactive, arousable


Patient Complaints of:: congestion


Per staff patient is:: no adverse event, confused





Internal Medicine Objective





- Results


Result Diagrams: 


 01/21/17 05:25





 01/21/17 05:25


Recent Labs: 


 Laboratory Last Values











WBC  6.7 Th/cmm (4.8-10.8)  D 01/21/17  05:25    


 


RBC  4.56 Mil/cmm (4.30-5.70)   01/21/17  05:25    


 


Hgb  13.5 gm/dL (13.2-17.3)   01/21/17  05:25    


 


Hct  39.8 % (39.0-49.0)   01/21/17  05:25    


 


MCV  87.2 fl (80-99)   01/21/17  05:25    


 


MCH  29.6 pg (26.0-30.0)   01/21/17  05:25    


 


MCHC Differential  33.9 pg (28.0-36.0)   01/21/17  05:25    


 


RDW  12.5 % (11.5-20.0)   01/21/17  05:25    


 


Plt Count  180 Th/cmm (150-400)   01/21/17  05:25    


 


MPV  8.0 fl  01/21/17  05:25    


 


Neutrophils %  49.4 % (40.0-80.0)   01/21/17  05:25    


 


Band Neutrophils %  5 % (0-10)   01/13/17  06:16    


 


Lymphocytes %  32.8 % (20.0-50.0)   01/21/17  05:25    


 


Monocytes %  10.8 % (2.0-10.0)  H  01/21/17  05:25    


 


Eosinophils %  5.3 % (0.0-5.0)  H  01/21/17  05:25    


 


Basophils %  1.7 % (0.0-2.0)   01/21/17  05:25    


 


Neutrophils (Manual)  42 % (40-80)   01/17/17  05:45    


 


Lymphocytes  36 % (20-50)   01/17/17  05:45    


 


Monocytes  14 % (2-10)  H  01/17/17  05:45    


 


Eosinophils  2 % (0-5)   01/17/17  05:45    


 


Basophils  1 % (0-3)   01/17/17  05:45    


 


Atypical Lymphocytes  5 %  01/17/17  05:45    


 


Platelet Estimate  ADEQUATE  (NORMAL)   01/17/17  05:45    


 


Platelet Morphology  NORMAL  (NORMAL)   01/17/17  05:45    


 


RBC Morph Micro Appear  NORMAL  (NORMAL)   01/17/17  05:45    


 


Sodium  133 mEq/L (136-145)  L  01/21/17  05:25    


 


Potassium  3.9 mEq/L (3.5-5.1)   01/21/17  05:25    


 


Chloride  101 mEq/L ()   01/21/17  05:25    


 


Carbon Dioxide  25.6 mEq/L (21.0-31.0)   01/21/17  05:25    


 


Anion Gap  10.3  (7.0-16.0)   01/21/17  05:25    


 


BUN  9 mg/dL (7-25)   01/21/17  05:25    


 


Creatinine  0.4 mg/dL (0.7-1.3)  L  01/21/17  05:25    


 


Est GFR ( Amer)  > 60.0 ml/min  01/21/17  05:25    


 


Est GFR (Non-Af Amer)  > 60.0 ml/min  01/21/17  05:25    


 


BUN/Creatinine Ratio  22.5   01/21/17  05:25    


 


Glucose  103 mg/dL ()   01/21/17  05:25    


 


Calcium  9.3 mg/dL (8.6-10.3)   01/21/17  05:25    


 


Total Bilirubin  0.4 mg/dL (0.3-1.0)   01/18/17  06:55    


 


AST  23 U/L (13-39)   01/18/17  06:55    


 


ALT  15 U/L (7-52)   01/18/17  06:55    


 


Alkaline Phosphatase  65 U/L ()   01/18/17  06:55    


 


Total Protein  7.6 gm/dL (6.0-8.3)   01/18/17  06:55    


 


Albumin  4.2 gm/dL (4.2-5.5)   01/18/17  06:55    


 


Globulin  3.4 gm/dL  01/18/17  06:55    


 


Albumin/Globulin Ratio  1.2  (1.0-1.8)   01/18/17  06:55    


 


Lipase  13 U/L (11-82)   01/12/17  23:39    


 


TSH  0.20 uIU/ml (0.34-5.60)  L  01/13/17  06:16    


 


Urine Source  CLEAN C   01/14/17  21:35    


 


Urine Color  YELLOW   01/14/17  21:35    


 


Urine Clarity  CLEAR  (CLEAR)   01/14/17  21:35    


 


Urine pH  >=9.0   01/14/17  21:35    


 


Ur Specific Gravity  1.020  (1.005-1.030)   01/14/17  21:35    


 


Urine Protein  TRACE mg/dL (NEGATIVE)   01/14/17  21:35    


 


Urine Glucose (UA)  NEGATIVE mg/dL (NEGATIVE)   01/14/17  21:35    


 


Urine Ketones  NEGATIVE mg/dL (NEGATIVE)   01/14/17  21:35    


 


Urine Blood  NEGATIVE  (NEGATIVE)   01/14/17  21:35    


 


Urine Nitrate  NEGATIVE  (NEGATIVE)   01/14/17  21:35    


 


Urine Bilirubin  NEGATIVE  (NEGATIVE)   01/14/17  21:35    


 


Urine Urobilinogen  0.2 E.U./dL (0.2 - 1.0)   01/14/17  21:35    


 


Ur Leukocyte Esterase  NEGATIVE  (NEGATIVE)   01/14/17  21:35    


 


Urine RBC  0-2 /hpf (0-5)  H  01/14/17  21:35    


 


Urine WBC  0-2 /hpf (0-5)   01/14/17  21:35    


 


Ur Epithelial Cells  NONE SEEN /lpf (FEW)   01/14/17  21:35    


 


Urine Bacteria  FEW /hpf (NONE SEEN)   01/14/17  21:35    














- Physical Exam


Vitals and I&O: 


 Vital Signs











Temp  97.2 F   01/21/17 08:34


 


Pulse  62   01/21/17 08:34


 


Resp  18   01/21/17 08:34


 


BP  105/58   01/21/17 08:34


 


Pulse Ox  99   01/21/17 08:34








 Intake & Output











 01/20/17 01/21/17 01/21/17





 18:59 06:59 18:59


 


Intake Total  470 


 


Balance  470 


 


Intake:   


 


  Oral  350 


 


  Tube Feeding  120 


 


Other:   


 


  # Voids  2 


 


  # Bowel Movements  1 











Active Medications: 


 Current Medications





Bisacodyl (Dulcolax 10 Mg Supp)  10 mg RC DAILY PRN


   PRN Reason: Constipation


   Stop: 03/13/17 23:52


Dextrose/Sodium Chloride (D5-0.45ns)  1,000 mls @ 70 mls/hr IV .[id number] [facility]


   Stop: 03/21/17 19:04


   Last Admin: 01/21/17 08:25 Dose:  70 mls/hr


Magnesium Hydroxide (Milk Of Magnesia)  30 ml PO HS [facility]


   Stop: 03/14/17 20:59


   Last Admin: 01/20/17 22:29 Dose:  30 ml


Metoclopramide HCl (Reglan)  10 mg PO TID [facility]


   Stop: 03/21/17 14:59


   Last Admin: 01/21/17 08:25 Dose:  10 mg


Mineral Oil (Mineral Oil 30 Ml)  30 ml GT DAILY [facility]


   Stop: 03/15/17 08:59


   Last Admin: 01/21/17 08:25 Dose:  30 ml


Prochlorperazine (Compazine)  25 mg RC Q12H PRN; Protocol


   PRN Reason: Nausea


   Stop: 03/13/17 23:52


Vitamin D (Vitamin D)  400 iu GT DAILY [facility]


   Stop: 03/14/17 08:59


   Last Admin: 01/21/17 08:25 Dose:  400 iu








General: lethargic


HEENT: NC/AT


Neck: Supple, No JVD


Lungs: congested


Cardiovascular: RRR, Normal S1, Normal S2


Abdomen: soft non-tender, globular, distended, positive bowel sound


Extremities: excoriation, contracture


Neurological: no change





- Procedures


Procedures: 


 Procedures











Procedure Code Date


 


EGD BIOPSY SINGLE/MULTIPLE 03250 07/10/14


 


ESOPHAGOGASTRODUODENOSCOPY [EGD] W/CLOSED BIOPSY 45.16 07/10/14


 


INFLUENZA VACCINATION 99.52 01/30/14


 


INSERT INDWELLING CATH 57.94 12/03/12


 


INSERT INTESTINAL TUBE 96.08 12/03/12


 


INSERT PICC CATH [id number] 12/03/12


 


INSERT TEMP BLADDER CATH [id number] 12/03/12


 


OPEN AND OTHER CECECTOMY 45.72 12/03/12


 


OPEN AND OTHER RIGHT HEMICOLECTOMY 45.73 12/03/12


 


PARTIAL REMOVAL OF COLON 17714 12/03/12


 


UNLISTED PX SMALL INTESTINE 70593 12/03/12


 


VENOUS CATHETERIZATION NEC 38.93 12/03/12














Internal Medicine Assmt/Plan





- Assessment


Assessment: 





abdominal pain


 intractable vomiting


dehyration


cp


stool impaction


debility





- Plan


Plan: 





cont on ivf


on reglan 


on ppi


dw rn 


gi follow up

## 2017-01-22 RX ADMIN — DEXTROSE AND SODIUM CHLORIDE SCH MLS/HR: 5; .45 INJECTION, SOLUTION INTRAVENOUS at 13:01

## 2017-01-22 RX ADMIN — MAGNESIUM HYDROXIDE SCH ML: 400 SUSPENSION ORAL at 21:18

## 2017-01-22 NOTE — INTERNAL MEDICINE PROG NOTE
Internal Medicine Subjective





- Subjective


Patient seen and examined:: with staff, chart reviewed


Patient is:: asleep, non-verbal, non-interactive


Per staff patient is:: no adverse event, confused





Internal Medicine Objective





- Results


Result Diagrams: 


 01/21/17 05:25





 01/21/17 05:25


Recent Labs: 


 Laboratory Last Values











WBC  6.7 Th/cmm (4.8-10.8)  D 01/21/17  05:25    


 


RBC  4.56 Mil/cmm (4.30-5.70)   01/21/17  05:25    


 


Hgb  13.5 gm/dL (13.2-17.3)   01/21/17  05:25    


 


Hct  39.8 % (39.0-49.0)   01/21/17  05:25    


 


MCV  87.2 fl (80-99)   01/21/17  05:25    


 


MCH  29.6 pg (26.0-30.0)   01/21/17  05:25    


 


MCHC Differential  33.9 pg (28.0-36.0)   01/21/17  05:25    


 


RDW  12.5 % (11.5-20.0)   01/21/17  05:25    


 


Plt Count  180 Th/cmm (150-400)   01/21/17  05:25    


 


MPV  8.0 fl  01/21/17  05:25    


 


Neutrophils %  49.4 % (40.0-80.0)   01/21/17  05:25    


 


Band Neutrophils %  5 % (0-10)   01/13/17  06:16    


 


Lymphocytes %  32.8 % (20.0-50.0)   01/21/17  05:25    


 


Monocytes %  10.8 % (2.0-10.0)  H  01/21/17  05:25    


 


Eosinophils %  5.3 % (0.0-5.0)  H  01/21/17  05:25    


 


Basophils %  1.7 % (0.0-2.0)   01/21/17  05:25    


 


Neutrophils (Manual)  42 % (40-80)   01/17/17  05:45    


 


Lymphocytes  36 % (20-50)   01/17/17  05:45    


 


Monocytes  14 % (2-10)  H  01/17/17  05:45    


 


Eosinophils  2 % (0-5)   01/17/17  05:45    


 


Basophils  1 % (0-3)   01/17/17  05:45    


 


Atypical Lymphocytes  5 %  01/17/17  05:45    


 


Platelet Estimate  ADEQUATE  (NORMAL)   01/17/17  05:45    


 


Platelet Morphology  NORMAL  (NORMAL)   01/17/17  05:45    


 


RBC Morph Micro Appear  NORMAL  (NORMAL)   01/17/17  05:45    


 


Sodium  133 mEq/L (136-145)  L  01/21/17  05:25    


 


Potassium  3.9 mEq/L (3.5-5.1)   01/21/17  05:25    


 


Chloride  101 mEq/L ()   01/21/17  05:25    


 


Carbon Dioxide  25.6 mEq/L (21.0-31.0)   01/21/17  05:25    


 


Anion Gap  10.3  (7.0-16.0)   01/21/17  05:25    


 


BUN  9 mg/dL (7-25)   01/21/17  05:25    


 


Creatinine  0.4 mg/dL (0.7-1.3)  L  01/21/17  05:25    


 


Est GFR ( Amer)  > 60.0 ml/min  01/21/17  05:25    


 


Est GFR (Non-Af Amer)  > 60.0 ml/min  01/21/17  05:25    


 


BUN/Creatinine Ratio  22.5   01/21/17  05:25    


 


Glucose  103 mg/dL ()   01/21/17  05:25    


 


Calcium  9.3 mg/dL (8.6-10.3)   01/21/17  05:25    


 


Total Bilirubin  0.4 mg/dL (0.3-1.0)   01/18/17  06:55    


 


AST  23 U/L (13-39)   01/18/17  06:55    


 


ALT  15 U/L (7-52)   01/18/17  06:55    


 


Alkaline Phosphatase  65 U/L ()   01/18/17  06:55    


 


Total Protein  7.6 gm/dL (6.0-8.3)   01/18/17  06:55    


 


Albumin  4.2 gm/dL (4.2-5.5)   01/18/17  06:55    


 


Globulin  3.4 gm/dL  01/18/17  06:55    


 


Albumin/Globulin Ratio  1.2  (1.0-1.8)   01/18/17  06:55    


 


Lipase  13 U/L (11-82)   01/12/17  23:39    


 


TSH  0.20 uIU/ml (0.34-5.60)  L  01/13/17  06:16    


 


Urine Source  CLEAN C   01/14/17  21:35    


 


Urine Color  YELLOW   01/14/17  21:35    


 


Urine Clarity  CLEAR  (CLEAR)   01/14/17  21:35    


 


Urine pH  >=9.0   01/14/17  21:35    


 


Ur Specific Gravity  1.020  (1.005-1.030)   01/14/17  21:35    


 


Urine Protein  TRACE mg/dL (NEGATIVE)   01/14/17  21:35    


 


Urine Glucose (UA)  NEGATIVE mg/dL (NEGATIVE)   01/14/17  21:35    


 


Urine Ketones  NEGATIVE mg/dL (NEGATIVE)   01/14/17  21:35    


 


Urine Blood  NEGATIVE  (NEGATIVE)   01/14/17  21:35    


 


Urine Nitrate  NEGATIVE  (NEGATIVE)   01/14/17  21:35    


 


Urine Bilirubin  NEGATIVE  (NEGATIVE)   01/14/17  21:35    


 


Urine Urobilinogen  0.2 E.U./dL (0.2 - 1.0)   01/14/17  21:35    


 


Ur Leukocyte Esterase  NEGATIVE  (NEGATIVE)   01/14/17  21:35    


 


Urine RBC  0-2 /hpf (0-5)  H  01/14/17  21:35    


 


Urine WBC  0-2 /hpf (0-5)   01/14/17  21:35    


 


Ur Epithelial Cells  NONE SEEN /lpf (FEW)   01/14/17  21:35    


 


Urine Bacteria  FEW /hpf (NONE SEEN)   01/14/17  21:35    














- Physical Exam


Vitals and I&O: 


 Vital Signs











Temp  97.5 F   01/22/17 08:00


 


Pulse  61   01/22/17 08:00


 


Resp  18   01/22/17 08:00


 


BP  120/73   01/22/17 08:00


 


Pulse Ox  95   01/22/17 08:00








 Intake & Output











 01/21/17 01/22/17 01/22/17





 18:59 06:59 18:59


 


Intake Total 250 918.167 150


 


Balance 250 918.167 150


 


Intake:   


 


  Intake, IV Amount  918.167 


 


    D5-0.45NS 1,000 ml @ 70  918.167 





    mls/hr IV .[id number] [facility]   





    #:[id number]   


 


  Oral 100  0


 


  Tube Feeding 150  150


 


Other:   


 


  # Voids 1  1


 


  # Bowel Movements 0  











Active Medications: 


 Current Medications





Bisacodyl (Dulcolax 10 Mg Supp)  10 mg RC DAILY PRN


   PRN Reason: Constipation


   Stop: 03/13/17 23:52


Bisacodyl (Dulcolax 5 Mg Ec Tab)  10 mg PO X1 ONE


   Stop: 01/22/17 16:01


Dextrose/Sodium Chloride (D5-0.45ns)  1,000 mls @ 70 mls/hr IV .[id number] ALISON


   Stop: 03/21/17 19:04


   Last Admin: 01/21/17 21:32 Dose:  70 mls/hr


Magnesium Hydroxide (Milk Of Magnesia)  30 ml PO HS ALISON


   Stop: 03/14/17 20:59


   Last Admin: 01/21/17 21:24 Dose:  30 ml


Metoclopramide HCl (Reglan)  10 mg PO TID ALISON


   Stop: 03/21/17 14:59


   Last Admin: 01/22/17 08:20 Dose:  10 mg


Mineral Oil (Mineral Oil 30 Ml)  30 ml GT DAILY ALISON


   Stop: 03/15/17 08:59


   Last Admin: 01/22/17 08:20 Dose:  30 ml


Prochlorperazine (Compazine)  25 mg RC Q12H PRN; Protocol


   PRN Reason: Nausea


   Stop: 03/13/17 23:52


Vitamin D (Vitamin D)  400 iu GT DAILY ALISON


   Stop: 03/14/17 08:59


   Last Admin: 01/22/17 08:20 Dose:  400 iu








General: lethargic


HEENT: NC/AT, PERRLA


Neck: Supple, No JVD


Lungs: CTAB


Cardiovascular: RRR, Normal S1, Normal S2


Abdomen: soft non-tender, globular, +GT


Extremities: excoriation, contracture


Neurological: no change





- Procedures


Procedures: 


 Procedures











Procedure Code Date


 


EGD BIOPSY SINGLE/MULTIPLE 96689 07/10/14


 


ESOPHAGOGASTRODUODENOSCOPY [EGD] W/CLOSED BIOPSY 45.16 07/10/14


 


INFLUENZA VACCINATION 99.52 01/30/14


 


INSERT INDWELLING CATH 57.94 12/03/12


 


INSERT INTESTINAL TUBE 96.08 12/03/12


 


INSERT PICC CATH [id number] 12/03/12


 


INSERT TEMP BLADDER CATH [id number] 12/03/12


 


OPEN AND OTHER CECECTOMY 45.72 12/03/12


 


OPEN AND OTHER RIGHT HEMICOLECTOMY 45.73 12/03/12


 


PARTIAL REMOVAL OF COLON 92713 12/03/12


 


UNLISTED PX SMALL INTESTINE 13937 12/03/12


 


VENOUS CATHETERIZATION NEC 38.93 12/03/12














Internal Medicine Assmt/Plan





- Assessment


Assessment: 





abdominal pain


 intractable vomiting


dehyration


cp


stool impaction


debility





- Plan


Plan: 





cont on ivf


on reglan 


on ppi


robert rn 


gi follow up

## 2017-01-23 LAB
INR PPP: 1.1 (ref 0.5–1.4)
PROTHROMBIN TIME: 11 SECONDS (ref 9.5–11.5)

## 2017-01-23 PROCEDURE — 0DJD8ZZ INSPECTION OF LOWER INTESTINAL TRACT, VIA NATURAL OR ARTIFICIAL OPENING ENDOSCOPIC: ICD-10-PCS

## 2017-01-23 RX ADMIN — MAGNESIUM HYDROXIDE SCH ML: 400 SUSPENSION ORAL at 22:04

## 2017-01-23 RX ADMIN — DEXTROSE AND SODIUM CHLORIDE SCH MLS/HR: 5; .45 INJECTION, SOLUTION INTRAVENOUS at 12:05

## 2017-01-23 NOTE — OPERATIVE REPORT
GASTROINTESTINAL CONSULTATION



NAME OF PROCEDURE:  Colonoscopy.



REASON FOR PROCEDURE:  Fecal impaction.



REFERRING PHYSICIAN:  Dr. Calero.



CONSENT:  Risks, benefits, alternatives, nature, indication, possible outcomes

were discussed.  Mentioned bleeding, infection, perforation, death, disability,

cardiopulmonary distress and arrest, missed lesion and cancers, need for

surgery.  The patient's mother expressed understanding and provided informed

consent.



PREOPERATIVE DIAGNOSIS:  Fecal impaction.



POSTOPERATIVE DIAGNOSES:  Altered anatomy of the colon, did not see the

ileocecal valve and appendiceal orifice or cecum, possible anastomosis.



MEDICATIONS:  Provided by anesthesiologist because the patient has mental

retardation and difficult IV access.



DESCRIPTION OF PROCEDURE:  The patient was placed on left side.  Rectal exam was

performed and was normal.  Pediatric colonoscope was advanced from the anus into

the proximal colon where there might have been an anastomosis.  Scope could not

get beyond this area.  Scope was slowly withdrawn ____ mucosa along the way. 

Once in the rectum, retroflexion was performed, scope was straightened and

removed along with air.



COMPLICATIONS:  None.



FINDINGS:  Ulcerative anatomy, possible colonic enteric anastomosis.  No

ileocecal valve, cecum or appendiceal orifice seen.



RECOMMENDATIONS:

1.  Barium enema.

2.  Continue supportive care and laxatives.



Thank you for allowing me to participate.  Please call me if any questions.





DD: 01/23/2017 14:19

DT: 01/23/2017 17:50

[facility]# [id number]  [id number]

## 2017-01-23 NOTE — INTERNAL MEDICINE PROG NOTE
Internal Medicine Subjective





- Subjective


Service Date: 01/23/17


Patient seen and examined:: with staff


Patient is:: awake, non-interactive





Internal Medicine Objective





- Results


Result Diagrams: 


 01/21/17 05:25





 01/21/17 05:25


Recent Labs: 


 Laboratory Last Values











WBC  6.7 Th/cmm (4.8-10.8)  D 01/21/17  05:25    


 


RBC  4.56 Mil/cmm (4.30-5.70)   01/21/17  05:25    


 


Hgb  13.5 gm/dL (13.2-17.3)   01/21/17  05:25    


 


Hct  39.8 % (39.0-49.0)   01/21/17  05:25    


 


MCV  87.2 fl (80-99)   01/21/17  05:25    


 


MCH  29.6 pg (26.0-30.0)   01/21/17  05:25    


 


MCHC Differential  33.9 pg (28.0-36.0)   01/21/17  05:25    


 


RDW  12.5 % (11.5-20.0)   01/21/17  05:25    


 


Plt Count  180 Th/cmm (150-400)   01/21/17  05:25    


 


MPV  8.0 fl  01/21/17  05:25    


 


Neutrophils %  49.4 % (40.0-80.0)   01/21/17  05:25    


 


Band Neutrophils %  5 % (0-10)   01/13/17  06:16    


 


Lymphocytes %  32.8 % (20.0-50.0)   01/21/17  05:25    


 


Monocytes %  10.8 % (2.0-10.0)  H  01/21/17  05:25    


 


Eosinophils %  5.3 % (0.0-5.0)  H  01/21/17  05:25    


 


Basophils %  1.7 % (0.0-2.0)   01/21/17  05:25    


 


Neutrophils (Manual)  42 % (40-80)   01/17/17  05:45    


 


Lymphocytes  36 % (20-50)   01/17/17  05:45    


 


Monocytes  14 % (2-10)  H  01/17/17  05:45    


 


Eosinophils  2 % (0-5)   01/17/17  05:45    


 


Basophils  1 % (0-3)   01/17/17  05:45    


 


Atypical Lymphocytes  5 %  01/17/17  05:45    


 


Platelet Estimate  ADEQUATE  (NORMAL)   01/17/17  05:45    


 


Platelet Morphology  NORMAL  (NORMAL)   01/17/17  05:45    


 


RBC Morph Micro Appear  NORMAL  (NORMAL)   01/17/17  05:45    


 


PT  11.0 SECONDS (9.5-11.5)   01/23/17  07:00    


 


INR  1.10  (0.5-1.4)   01/23/17  07:00    


 


Sodium  133 mEq/L (136-145)  L  01/21/17  05:25    


 


Potassium  3.9 mEq/L (3.5-5.1)   01/21/17  05:25    


 


Chloride  101 mEq/L ()   01/21/17  05:25    


 


Carbon Dioxide  25.6 mEq/L (21.0-31.0)   01/21/17  05:25    


 


Anion Gap  10.3  (7.0-16.0)   01/21/17  05:25    


 


BUN  9 mg/dL (7-25)   01/21/17  05:25    


 


Creatinine  0.4 mg/dL (0.7-1.3)  L  01/21/17  05:25    


 


Est GFR ( Amer)  > 60.0 ml/min  01/21/17  05:25    


 


Est GFR (Non-Af Amer)  > 60.0 ml/min  01/21/17  05:25    


 


BUN/Creatinine Ratio  22.5   01/21/17  05:25    


 


Glucose  103 mg/dL ()   01/21/17  05:25    


 


Calcium  9.3 mg/dL (8.6-10.3)   01/21/17  05:25    


 


Total Bilirubin  0.4 mg/dL (0.3-1.0)   01/18/17  06:55    


 


AST  23 U/L (13-39)   01/18/17  06:55    


 


ALT  15 U/L (7-52)   01/18/17  06:55    


 


Alkaline Phosphatase  65 U/L ()   01/18/17  06:55    


 


Total Protein  7.6 gm/dL (6.0-8.3)   01/18/17  06:55    


 


Albumin  4.2 gm/dL (4.2-5.5)   01/18/17  06:55    


 


Globulin  3.4 gm/dL  01/18/17  06:55    


 


Albumin/Globulin Ratio  1.2  (1.0-1.8)   01/18/17  06:55    


 


Lipase  13 U/L (11-82)   01/12/17  23:39    


 


TSH  0.20 uIU/ml (0.34-5.60)  L  01/13/17  06:16    


 


Urine Source  CLEAN C   01/14/17  21:35    


 


Urine Color  YELLOW   01/14/17  21:35    


 


Urine Clarity  CLEAR  (CLEAR)   01/14/17  21:35    


 


Urine pH  >=9.0   01/14/17  21:35    


 


Ur Specific Gravity  1.020  (1.005-1.030)   01/14/17  21:35    


 


Urine Protein  TRACE mg/dL (NEGATIVE)   01/14/17  21:35    


 


Urine Glucose (UA)  NEGATIVE mg/dL (NEGATIVE)   01/14/17  21:35    


 


Urine Ketones  NEGATIVE mg/dL (NEGATIVE)   01/14/17  21:35    


 


Urine Blood  NEGATIVE  (NEGATIVE)   01/14/17  21:35    


 


Urine Nitrate  NEGATIVE  (NEGATIVE)   01/14/17  21:35    


 


Urine Bilirubin  NEGATIVE  (NEGATIVE)   01/14/17  21:35    


 


Urine Urobilinogen  0.2 E.U./dL (0.2 - 1.0)   01/14/17  21:35    


 


Ur Leukocyte Esterase  NEGATIVE  (NEGATIVE)   01/14/17  21:35    


 


Urine RBC  0-2 /hpf (0-5)  H  01/14/17  21:35    


 


Urine WBC  0-2 /hpf (0-5)   01/14/17  21:35    


 


Ur Epithelial Cells  NONE SEEN /lpf (FEW)   01/14/17  21:35    


 


Urine Bacteria  FEW /hpf (NONE SEEN)   01/14/17  21:35    














- Physical Exam


Vitals and I&O: 


 Vital Signs











Temp  97.4 F   01/23/17 04:25


 


Pulse  77   01/23/17 04:25


 


Resp  20   01/23/17 08:19


 


BP  114/73   01/23/17 04:25


 


Pulse Ox  97   01/23/17 04:25








 Intake & Output











 01/22/17 01/23/17 01/23/17





 18:59 06:59 18:59


 


Intake Total 1150 1200 


 


Balance 1150 1200 


 


Intake:   


 


  Intake, IV Amount 1000 1000 


 


    D5-0.45NS 1,000 ml @ 70 1000 1000 





    mls/hr IV .[id number] [facility]   





    #:[id number]   


 


  Oral 0  


 


  Tube Feeding 150 200 


 


Other:   


 


  # Voids 1  











Active Medications: 


 Current Medications





Bisacodyl (Dulcolax 10 Mg Supp)  10 mg RC DAILY PRN


   PRN Reason: Constipation


   Stop: 03/13/17 23:52


Dextrose/Sodium Chloride (D5-0.45ns)  1,000 mls @ 70 mls/hr IV .[id number] ALISON


   Stop: 03/21/17 19:04


   Last Admin: 01/23/17 12:05 Dose:  70 mls/hr


Magnesium Hydroxide (Milk Of Magnesia)  30 ml PO HS ALISON


   Stop: 03/14/17 20:59


   Last Admin: 01/22/17 21:18 Dose:  30 ml


Megestrol Acetate (Megace)  400 mg PO BID ALISON


   PRN Reason: Protocol


   Stop: 03/23/17 16:59


   Last Admin: 01/22/17 16:02 Dose:  Not Given


Metoclopramide HCl (Reglan)  10 mg PO TID ALISON


   Stop: 03/21/17 14:59


   Last Admin: 01/22/17 21:18 Dose:  10 mg


Mineral Oil (Mineral Oil 30 Ml)  30 ml GT DAILY ALISON


   Stop: 03/15/17 08:59


   Last Admin: 01/22/17 08:20 Dose:  30 ml


Prochlorperazine (Compazine)  25 mg RC Q12H PRN; Protocol


   PRN Reason: Nausea


   Stop: 03/13/17 23:52


Vitamin D (Vitamin D)  400 iu GT DAILY ALISON


   Stop: 03/14/17 08:59


   Last Admin: 01/22/17 08:20 Dose:  400 iu








General: weak


HEENT: NC/AT


Neck: Supple


Lungs: CTAB


Cardiovascular: RRR, Normal S1, Normal S2, without murmur


Abdomen: soft non-tender





- Procedures


Procedures: 


 Procedures











Procedure Code Date


 


EGD BIOPSY SINGLE/MULTIPLE 57177 07/10/14


 


ESOPHAGOGASTRODUODENOSCOPY [EGD] W/CLOSED BIOPSY 45.16 07/10/14


 


INFLUENZA VACCINATION 99.52 01/30/14


 


INSERT INDWELLING CATH 57.94 12/03/12


 


INSERT INTESTINAL TUBE 96.08 12/03/12


 


INSERT PICC CATH [id number] 12/03/12


 


INSERT TEMP BLADDER CATH [id number] 12/03/12


 


OPEN AND OTHER CECECTOMY 45.72 12/03/12


 


OPEN AND OTHER RIGHT HEMICOLECTOMY 45.73 12/03/12


 


PARTIAL REMOVAL OF COLON 06596 12/03/12


 


UNLISTED PX SMALL INTESTINE 53386 12/03/12


 


VENOUS CATHETERIZATION NEC 38.93 12/03/12














Internal Medicine Assmt/Plan





- Assessment


Assessment: 





abdominal pain


 intractable vomiting- resolved








- Plan


Plan: 


ivf for hydration


colonoscopy today 


reglan prn


monitor electrolytes


cbc/bmp in am

## 2017-01-24 LAB
ANION GAP SERPL CALC-SCNC: 8.7 MMOL/L (ref 7–16)
BASOPHILS NFR BLD AUTO: 0.4 % (ref 0–2)
BUN SERPL-MCNC: 7 MG/DL (ref 7–25)
BUN/CREAT SERPL: 14
CALCIUM SERPL-MCNC: 9.4 MG/DL (ref 8.6–10.3)
CHLORIDE SERPL-SCNC: 106 MEQ/L (ref 98–107)
CO2 SERPL-SCNC: 25 MEQ/L (ref 21–31)
CREAT SERPL-MCNC: 0.5 MG/DL (ref 0.7–1.3)
EOSINOPHIL NFR BLD AUTO: 3.4 % (ref 0–5)
ERYTHROCYTE [DISTWIDTH] IN BLOOD BY AUTOMATED COUNT: 12.6 % (ref 11.5–20)
GLUCOSE SERPL-MCNC: 114 MG/DL (ref 70–105)
HCT VFR BLD CALC: 40.2 % (ref 39–49)
HGB BLD-MCNC: 13.5 GM/DL (ref 13.2–17.3)
LYMPHOCYTES NFR BLD AUTO: 26.4 % (ref 20–50)
MCH RBC QN AUTO: 30 PG (ref 26–30)
MCHC RBC AUTO-ENTMCNC: 33.7 PG (ref 28–36)
MCV RBC AUTO: 88.9 FL (ref 80–99)
MONOCYTES NFR BLD AUTO: 8 % (ref 2–10)
NEUTROPHILS # BLD: 4.2 TH/CMM (ref 1.8–8)
NEUTROPHILS NFR BLD AUTO: 61.8 % (ref 40–80)
PLATELET # BLD: 190 TH/CMM (ref 150–400)
PMV BLD AUTO: 7.9 FL
POTASSIUM SERPL-SCNC: 3.7 MEQ/L (ref 3.5–5.1)
RBC # BLD AUTO: 4.52 MIL/CMM (ref 4.3–5.7)
SODIUM SERPL-SCNC: 136 MEQ/L (ref 136–145)
WBC # BLD AUTO: 6.7 TH/CMM (ref 4.8–10.8)

## 2017-01-24 RX ADMIN — MAGNESIUM HYDROXIDE SCH ML: 400 SUSPENSION ORAL at 22:37

## 2017-01-24 NOTE — INTERNAL MEDICINE PROG NOTE
Internal Medicine Subjective





- Subjective


Patient seen and examined:: with staff, chart reviewed


Patient is:: awake, non-verbal, non-interactive


Per staff patient is:: no adverse event, noncompliant, confused





Internal Medicine Objective





- Results


Result Diagrams: 


 01/24/17 06:45





 01/24/17 06:45


Recent Labs: 


 Laboratory Last Values











WBC  6.7 Th/cmm (4.8-10.8)   01/24/17  06:45    


 


RBC  4.52 Mil/cmm (4.30-5.70)   01/24/17  06:45    


 


Hgb  13.5 gm/dL (13.2-17.3)   01/24/17  06:45    


 


Hct  40.2 % (39.0-49.0)   01/24/17  06:45    


 


MCV  88.9 fl (80-99)   01/24/17  06:45    


 


MCH  30.0 pg (26.0-30.0)   01/24/17  06:45    


 


MCHC Differential  33.7 pg (28.0-36.0)   01/24/17  06:45    


 


RDW  12.6 % (11.5-20.0)   01/24/17  06:45    


 


Plt Count  190 Th/cmm (150-400)   01/24/17  06:45    


 


MPV  7.9 fl  01/24/17  06:45    


 


Neutrophils %  61.8 % (40.0-80.0)   01/24/17  06:45    


 


Band Neutrophils %  5 % (0-10)   01/13/17  06:16    


 


Lymphocytes %  26.4 % (20.0-50.0)   01/24/17  06:45    


 


Monocytes %  8.0 % (2.0-10.0)   01/24/17  06:45    


 


Eosinophils %  3.4 % (0.0-5.0)   01/24/17  06:45    


 


Basophils %  0.4 % (0.0-2.0)   01/24/17  06:45    


 


Neutrophils (Manual)  42 % (40-80)   01/17/17  05:45    


 


Lymphocytes  36 % (20-50)   01/17/17  05:45    


 


Monocytes  14 % (2-10)  H  01/17/17  05:45    


 


Eosinophils  2 % (0-5)   01/17/17  05:45    


 


Basophils  1 % (0-3)   01/17/17  05:45    


 


Atypical Lymphocytes  5 %  01/17/17  05:45    


 


Platelet Estimate  ADEQUATE  (NORMAL)   01/17/17  05:45    


 


Platelet Morphology  NORMAL  (NORMAL)   01/17/17  05:45    


 


RBC Morph Micro Appear  NORMAL  (NORMAL)   01/17/17  05:45    


 


PT  11.0 SECONDS (9.5-11.5)   01/23/17  07:00    


 


INR  1.10  (0.5-1.4)   01/23/17  07:00    


 


Sodium  136 mEq/L (136-145)   01/24/17  06:45    


 


Potassium  3.7 mEq/L (3.5-5.1)   01/24/17  06:45    


 


Chloride  106 mEq/L ()   01/24/17  06:45    


 


Carbon Dioxide  25.0 mEq/L (21.0-31.0)   01/24/17  06:45    


 


Anion Gap  8.7  (7.0-16.0)   01/24/17  06:45    


 


BUN  7 mg/dL (7-25)   01/24/17  06:45    


 


Creatinine  0.5 mg/dL (0.7-1.3)  L  01/24/17  06:45    


 


Est GFR ( Amer)  > 60.0 ml/min  01/24/17  06:45    


 


Est GFR (Non-Af Amer)  > 60.0 ml/min  01/24/17  06:45    


 


BUN/Creatinine Ratio  14.0   01/24/17  06:45    


 


Glucose  114 mg/dL ()  H  01/24/17  06:45    


 


Calcium  9.4 mg/dL (8.6-10.3)   01/24/17  06:45    


 


Total Bilirubin  0.4 mg/dL (0.3-1.0)   01/18/17  06:55    


 


AST  23 U/L (13-39)   01/18/17  06:55    


 


ALT  15 U/L (7-52)   01/18/17  06:55    


 


Alkaline Phosphatase  65 U/L ()   01/18/17  06:55    


 


Total Protein  7.6 gm/dL (6.0-8.3)   01/18/17  06:55    


 


Albumin  4.2 gm/dL (4.2-5.5)   01/18/17  06:55    


 


Globulin  3.4 gm/dL  01/18/17  06:55    


 


Albumin/Globulin Ratio  1.2  (1.0-1.8)   01/18/17  06:55    


 


Lipase  13 U/L (11-82)   01/12/17  23:39    


 


TSH  0.20 uIU/ml (0.34-5.60)  L  01/13/17  06:16    


 


Urine Source  CLEAN C   01/14/17  21:35    


 


Urine Color  YELLOW   01/14/17  21:35    


 


Urine Clarity  CLEAR  (CLEAR)   01/14/17  21:35    


 


Urine pH  >=9.0   01/14/17  21:35    


 


Ur Specific Gravity  1.020  (1.005-1.030)   01/14/17  21:35    


 


Urine Protein  TRACE mg/dL (NEGATIVE)   01/14/17  21:35    


 


Urine Glucose (UA)  NEGATIVE mg/dL (NEGATIVE)   01/14/17  21:35    


 


Urine Ketones  NEGATIVE mg/dL (NEGATIVE)   01/14/17  21:35    


 


Urine Blood  NEGATIVE  (NEGATIVE)   01/14/17  21:35    


 


Urine Nitrate  NEGATIVE  (NEGATIVE)   01/14/17  21:35    


 


Urine Bilirubin  NEGATIVE  (NEGATIVE)   01/14/17  21:35    


 


Urine Urobilinogen  0.2 E.U./dL (0.2 - 1.0)   01/14/17  21:35    


 


Ur Leukocyte Esterase  NEGATIVE  (NEGATIVE)   01/14/17  21:35    


 


Urine RBC  0-2 /hpf (0-5)  H  01/14/17  21:35    


 


Urine WBC  0-2 /hpf (0-5)   01/14/17  21:35    


 


Ur Epithelial Cells  NONE SEEN /lpf (FEW)   01/14/17  21:35    


 


Urine Bacteria  FEW /hpf (NONE SEEN)   01/14/17  21:35    














- Physical Exam


Vitals and I&O: 


 Vital Signs











Temp  98.5 F   01/24/17 12:00


 


Pulse  71   01/24/17 12:00


 


Resp  17   01/24/17 12:00


 


BP  124/59   01/24/17 12:00


 


Pulse Ox  98   01/24/17 12:00








 Intake & Output











 01/23/17 01/24/17 01/24/17





 18:59 06:59 18:59


 


Intake Total  600 


 


Balance  600 


 


Intake:   


 


  Oral  480 


 


  Tube Feeding  120 


 


Other:   


 


  # Voids  3 


 


  # Bowel Movements  3 


 


  Stool Characteristics   Formed











Active Medications: 


 Current Medications





Bisacodyl (Dulcolax 10 Mg Supp)  10 mg RC DAILY PRN


   PRN Reason: Constipation


   Stop: 03/13/17 23:52


Dextrose/Sodium Chloride (D5-0.45ns)  1,000 mls @ 70 mls/hr IV .[id number] [facility]


   Stop: 03/21/17 19:04


   Last Admin: 01/23/17 12:05 Dose:  70 mls/hr


Magnesium Hydroxide (Milk Of Magnesia)  30 ml PO HS [facility]


   Stop: 03/14/17 20:59


   Last Admin: 01/23/17 22:04 Dose:  30 ml


Megestrol Acetate (Megace)  400 mg PO BID [facility]


   PRN Reason: Protocol


   Stop: 03/23/17 16:59


   Last Admin: 01/24/17 09:38 Dose:  Not Given


Metoclopramide HCl (Reglan)  10 mg PO TID [facility]


   Stop: 03/21/17 14:59


   Last Admin: 01/24/17 09:38 Dose:  Not Given


Mineral Oil (Mineral Oil 30 Ml)  30 ml GT DAILY [facility]


   Stop: 03/15/17 08:59


   Last Admin: 01/24/17 09:38 Dose:  Not Given


Prochlorperazine (Compazine)  25 mg RC Q12H PRN; Protocol


   PRN Reason: Nausea


   Stop: 03/13/17 23:52


Vitamin D (Vitamin D)  400 iu GT DAILY [facility]


   Stop: 03/14/17 08:59


   Last Admin: 01/24/17 09:38 Dose:  Not Given








General: lethargic


HEENT: NC/AT, PERRLA


Neck: Supple, No JVD


Lungs: congested


Cardiovascular: RRR, Normal S1, Normal S2


Abdomen: soft non-tender, globular


Extremities: excoriation, contracture


Neurological: no change





- Procedures


Procedures: 


 Procedures











Procedure Code Date


 


EGD BIOPSY SINGLE/MULTIPLE 55790 07/10/14


 


ESOPHAGOGASTRODUODENOSCOPY [EGD] W/CLOSED BIOPSY 45.16 07/10/14


 


INFLUENZA VACCINATION 99.52 01/30/14


 


INSERT INDWELLING CATH 57.94 12/03/12


 


INSERT INTESTINAL TUBE 96.08 12/03/12


 


INSERT PICC CATH [id number] 12/03/12


 


INSERT TEMP BLADDER CATH [id number] 12/03/12


 


OPEN AND OTHER CECECTOMY 45.72 12/03/12


 


OPEN AND OTHER RIGHT HEMICOLECTOMY 45.73 12/03/12


 


PARTIAL REMOVAL OF COLON 61633 12/03/12


 


UNLISTED PX SMALL INTESTINE 10239 12/03/12


 


VENOUS CATHETERIZATION NEC 38.93 12/03/12














Internal Medicine Assmt/Plan





- Assessment


Assessment: 





abdominal pain


 intractable vomiting


dehyration


cp


stool impaction


debility





- Plan


Plan: 





cont on ivf


on reglan 


on ppi


robert rn 


gi follow up


for BE today

## 2017-01-24 NOTE — DIAGNOSTIC IMAGING REPORT
KUB abdominal film



HISTORY: Abdominal distention



The exam demonstrates  multiple loops of mildly dilated large and small

bowel. Little change from the prior exam of January 18, 2017. Changes

associated with any dynamic ileus cannot be excluded. Gastrostomy

catheter projects over the upper mid abdomen. No free intraperitoneal

air.



IMPRESSION:

1. Multiple loops of mildly dilated large and small bowel. Findings may

be associated with an ileus. Clinical correlation is needed.

## 2017-01-25 RX ADMIN — MAGNESIUM HYDROXIDE SCH ML: 400 SUSPENSION ORAL at 21:18

## 2017-01-25 NOTE — INTERNAL MEDICINE PROG NOTE
Internal Medicine Subjective





- Subjective


Service Date: 01/25/17


Patient seen and examined:: with staff


Patient is:: awake


Per staff patient is:: no adverse event





Internal Medicine Objective





- Results


Result Diagrams: 


 01/24/17 06:45





 01/24/17 06:45


Recent Labs: 


 Laboratory Last Values











WBC  6.7 Th/cmm (4.8-10.8)   01/24/17  06:45    


 


RBC  4.52 Mil/cmm (4.30-5.70)   01/24/17  06:45    


 


Hgb  13.5 gm/dL (13.2-17.3)   01/24/17  06:45    


 


Hct  40.2 % (39.0-49.0)   01/24/17  06:45    


 


MCV  88.9 fl (80-99)   01/24/17  06:45    


 


MCH  30.0 pg (26.0-30.0)   01/24/17  06:45    


 


MCHC Differential  33.7 pg (28.0-36.0)   01/24/17  06:45    


 


RDW  12.6 % (11.5-20.0)   01/24/17  06:45    


 


Plt Count  190 Th/cmm (150-400)   01/24/17  06:45    


 


MPV  7.9 fl  01/24/17  06:45    


 


Neutrophils %  61.8 % (40.0-80.0)   01/24/17  06:45    


 


Band Neutrophils %  5 % (0-10)   01/13/17  06:16    


 


Lymphocytes %  26.4 % (20.0-50.0)   01/24/17  06:45    


 


Monocytes %  8.0 % (2.0-10.0)   01/24/17  06:45    


 


Eosinophils %  3.4 % (0.0-5.0)   01/24/17  06:45    


 


Basophils %  0.4 % (0.0-2.0)   01/24/17  06:45    


 


Neutrophils (Manual)  42 % (40-80)   01/17/17  05:45    


 


Lymphocytes  36 % (20-50)   01/17/17  05:45    


 


Monocytes  14 % (2-10)  H  01/17/17  05:45    


 


Eosinophils  2 % (0-5)   01/17/17  05:45    


 


Basophils  1 % (0-3)   01/17/17  05:45    


 


Atypical Lymphocytes  5 %  01/17/17  05:45    


 


Platelet Estimate  ADEQUATE  (NORMAL)   01/17/17  05:45    


 


Platelet Morphology  NORMAL  (NORMAL)   01/17/17  05:45    


 


RBC Morph Micro Appear  NORMAL  (NORMAL)   01/17/17  05:45    


 


PT  11.0 SECONDS (9.5-11.5)   01/23/17  07:00    


 


INR  1.10  (0.5-1.4)   01/23/17  07:00    


 


Sodium  136 mEq/L (136-145)   01/24/17  06:45    


 


Potassium  3.7 mEq/L (3.5-5.1)   01/24/17  06:45    


 


Chloride  106 mEq/L ()   01/24/17  06:45    


 


Carbon Dioxide  25.0 mEq/L (21.0-31.0)   01/24/17  06:45    


 


Anion Gap  8.7  (7.0-16.0)   01/24/17  06:45    


 


BUN  7 mg/dL (7-25)   01/24/17  06:45    


 


Creatinine  0.5 mg/dL (0.7-1.3)  L  01/24/17  06:45    


 


Est GFR ( Amer)  > 60.0 ml/min  01/24/17  06:45    


 


Est GFR (Non-Af Amer)  > 60.0 ml/min  01/24/17  06:45    


 


BUN/Creatinine Ratio  14.0   01/24/17  06:45    


 


Glucose  114 mg/dL ()  H  01/24/17  06:45    


 


Calcium  9.4 mg/dL (8.6-10.3)   01/24/17  06:45    


 


Total Bilirubin  0.4 mg/dL (0.3-1.0)   01/18/17  06:55    


 


AST  23 U/L (13-39)   01/18/17  06:55    


 


ALT  15 U/L (7-52)   01/18/17  06:55    


 


Alkaline Phosphatase  65 U/L ()   01/18/17  06:55    


 


Total Protein  7.6 gm/dL (6.0-8.3)   01/18/17  06:55    


 


Albumin  4.2 gm/dL (4.2-5.5)   01/18/17  06:55    


 


Globulin  3.4 gm/dL  01/18/17  06:55    


 


Albumin/Globulin Ratio  1.2  (1.0-1.8)   01/18/17  06:55    


 


Lipase  13 U/L (11-82)   01/12/17  23:39    


 


TSH  0.20 uIU/ml (0.34-5.60)  L  01/13/17  06:16    


 


Urine Source  CLEAN C   01/14/17  21:35    


 


Urine Color  YELLOW   01/14/17  21:35    


 


Urine Clarity  CLEAR  (CLEAR)   01/14/17  21:35    


 


Urine pH  >=9.0   01/14/17  21:35    


 


Ur Specific Gravity  1.020  (1.005-1.030)   01/14/17  21:35    


 


Urine Protein  TRACE mg/dL (NEGATIVE)   01/14/17  21:35    


 


Urine Glucose (UA)  NEGATIVE mg/dL (NEGATIVE)   01/14/17  21:35    


 


Urine Ketones  NEGATIVE mg/dL (NEGATIVE)   01/14/17  21:35    


 


Urine Blood  NEGATIVE  (NEGATIVE)   01/14/17  21:35    


 


Urine Nitrate  NEGATIVE  (NEGATIVE)   01/14/17  21:35    


 


Urine Bilirubin  NEGATIVE  (NEGATIVE)   01/14/17  21:35    


 


Urine Urobilinogen  0.2 E.U./dL (0.2 - 1.0)   01/14/17  21:35    


 


Ur Leukocyte Esterase  NEGATIVE  (NEGATIVE)   01/14/17  21:35    


 


Urine RBC  0-2 /hpf (0-5)  H  01/14/17  21:35    


 


Urine WBC  0-2 /hpf (0-5)   01/14/17  21:35    


 


Ur Epithelial Cells  NONE SEEN /lpf (FEW)   01/14/17  21:35    


 


Urine Bacteria  FEW /hpf (NONE SEEN)   01/14/17  21:35    














- Physical Exam


Vitals and I&O: 


 Vital Signs











Temp  98.3 F   01/25/17 08:01


 


Pulse  65   01/25/17 08:01


 


Resp  18   01/25/17 08:01


 


BP  110/68   01/25/17 08:01


 


Pulse Ox  93   01/25/17 08:01








 Intake & Output











 01/24/17 01/25/17 01/25/17





 18:59 06:59 18:59


 


Intake Total 250 310 


 


Balance 250 310 


 


Intake:   


 


  Oral 250 310 


 


  Tube Feeding 0 0 


 


Other:   


 


  # Voids 3 2 


 


  # Bowel Movements 3 0 


 


  Stool Characteristics Formed Formed 











Active Medications: 


 Current Medications





Bisacodyl (Dulcolax 10 Mg Supp)  10 mg RC DAILY PRN


   PRN Reason: Constipation


   Stop: 03/13/17 23:52


Dextrose/Sodium Chloride (D5-0.45ns)  1,000 mls @ 70 mls/hr IV .[id number] ALISON


   Stop: 03/21/17 19:04


   Last Admin: 01/23/17 12:05 Dose:  70 mls/hr


Magnesium Hydroxide (Milk Of Magnesia)  30 ml PO HS ALISON


   Stop: 03/14/17 20:59


   Last Admin: 01/24/17 22:37 Dose:  30 ml


Megestrol Acetate (Megace)  400 mg PO BID ALISON


   PRN Reason: Protocol


   Stop: 03/23/17 16:59


   Last Admin: 01/25/17 09:41 Dose:  400 mg


Metoclopramide HCl (Reglan)  10 mg PO TID ALISON


   Stop: 03/21/17 14:59


   Last Admin: 01/25/17 09:41 Dose:  10 mg


Mineral Oil (Mineral Oil 30 Ml)  30 ml GT DAILY ALISON


   Stop: 03/15/17 08:59


   Last Admin: 01/25/17 09:41 Dose:  30 ml


Prochlorperazine (Compazine)  25 mg RC Q12H PRN; Protocol


   PRN Reason: Nausea


   Stop: 03/13/17 23:52


Vitamin D (Vitamin D)  400 iu GT DAILY Pending sale to [facility]


   Stop: 03/14/17 08:59


   Last Admin: 01/25/17 09:42 Dose:  400 iu








General: weak


HEENT: NC/AT


Neck: Supple


Lungs: CTAB


Cardiovascular: RRR, Normal S1, Normal S2, without murmur


Abdomen: soft non-tender, non-distended, positive bowel sound


Extremities: clear


Neurological: no change





- Procedures


Procedures: 


 Procedures











Procedure Code Date


 


DIAGNOSTIC COLONOSCOPY 15727 01/12/17


 


EGD BIOPSY SINGLE/MULTIPLE 73986 07/10/14


 


ESOPHAGOGASTRODUODENOSCOPY [EGD] W/CLOSED BIOPSY 45.16 07/10/14


 


INFLUENZA VACCINATION 99.52 01/30/14


 


INSERT INDWELLING CATH 57.94 12/03/12


 


INSERT INTESTINAL TUBE 96.08 12/03/12


 


INSERT PICC CATH [id number] 12/03/12


 


INSERT TEMP BLADDER CATH [id number] 12/03/12


 


INSPECTION OF LOWER INTESTINAL TRACT, ENDO [id number] 01/12/17


 


OPEN AND OTHER CECECTOMY 45.72 12/03/12


 


OPEN AND OTHER RIGHT HEMICOLECTOMY 45.73 12/03/12


 


PARTIAL REMOVAL OF COLON 23242 12/03/12


 


UNLISTED PX SMALL INTESTINE 02388 12/03/12


 


VENOUS CATHETERIZATION NEC 38.93 12/03/12














Internal Medicine Assmt/Plan





- Assessment


Assessment: 





abdominal pain


 intractable vomiting- resolved








- Plan


Plan: 


awaiting for placement 


ivf for hydration


reglan prn


monitor electrolytes


cbc/bmp in am

## 2017-01-25 NOTE — DIAGNOSTIC IMAGING REPORT
Barium (Gastrografin) enema



HISTORY: Abdominal distention, incomplete colonoscopy



The preliminary  radiograph demonstrates mild to moderately dilated

air-filled large and small bowel.



Exam is very limited and suboptimal due to patient contracture and

difficulty in positioning and mobility.



Contrast was passed from the rectum up through the distal portion of the

transverse colon. Contrast could not be passed beyond this point due to

difficulty in patient tolerances. No obvious focal lesions seen within

the visualized colon. No evidence of any extrinsic masses.



IMPRESSION:

1. Incomplete/suboptimal exam due to patient contracture, immobility,

and intolerance. No obvious focal lesions within the distal large bowel.

## 2017-01-26 LAB
ANION GAP SERPL CALC-SCNC: 12.6 MMOL/L (ref 7–16)
BASOPHILS NFR BLD AUTO: 0.7 % (ref 0–2)
BUN SERPL-MCNC: 10 MG/DL (ref 7–25)
BUN/CREAT SERPL: 20
CALCIUM SERPL-MCNC: 9.2 MG/DL (ref 8.6–10.3)
CHLORIDE SERPL-SCNC: 105 MEQ/L (ref 98–107)
CO2 SERPL-SCNC: 19.4 MEQ/L (ref 21–31)
CREAT SERPL-MCNC: 0.5 MG/DL (ref 0.7–1.3)
EOSINOPHIL NFR BLD AUTO: 1.2 % (ref 0–5)
ERYTHROCYTE [DISTWIDTH] IN BLOOD BY AUTOMATED COUNT: 12.3 % (ref 11.5–20)
GLUCOSE SERPL-MCNC: 104 MG/DL (ref 70–105)
HCT VFR BLD CALC: 38.9 % (ref 39–49)
HGB BLD-MCNC: 13.1 GM/DL (ref 13.2–17.3)
LYMPHOCYTES NFR BLD AUTO: 25 % (ref 20–50)
MCH RBC QN AUTO: 29.9 PG (ref 26–30)
MCHC RBC AUTO-ENTMCNC: 33.7 PG (ref 28–36)
MCV RBC AUTO: 88.5 FL (ref 80–99)
MONOCYTES NFR BLD AUTO: 7.9 % (ref 2–10)
NEUTROPHILS # BLD: 5.9 TH/CMM (ref 1.8–8)
NEUTROPHILS NFR BLD AUTO: 65.2 % (ref 40–80)
PLATELET # BLD: 180 TH/CMM (ref 150–400)
PMV BLD AUTO: 7.8 FL
POTASSIUM SERPL-SCNC: 4 MEQ/L (ref 3.5–5.1)
RBC # BLD AUTO: 4.4 MIL/CMM (ref 4.3–5.7)
SODIUM SERPL-SCNC: 133 MEQ/L (ref 136–145)
WBC # BLD AUTO: 9 TH/CMM (ref 4.8–10.8)

## 2017-01-26 RX ADMIN — MAGNESIUM HYDROXIDE SCH ML: 400 SUSPENSION ORAL at 21:08

## 2017-01-26 NOTE — INTERNAL MEDICINE PROG NOTE
Internal Medicine Subjective





- Subjective


Patient seen and examined:: with staff, chart reviewed


Patient is:: awake, non-verbal, non-interactive


Patient Complaints of:: congestion


Per staff patient is:: no adverse event, eating well, confused





Internal Medicine Objective





- Results


Result Diagrams: 


 01/26/17 05:30





 01/26/17 05:30


Recent Labs: 


 Laboratory Last Values











WBC  9.0 Th/cmm (4.8-10.8)  D 01/26/17  05:30    


 


RBC  4.40 Mil/cmm (4.30-5.70)   01/26/17  05:30    


 


Hgb  13.1 gm/dL (13.2-17.3)  L  01/26/17  05:30    


 


Hct  38.9 % (39.0-49.0)  L  01/26/17  05:30    


 


MCV  88.5 fl (80-99)   01/26/17  05:30    


 


MCH  29.9 pg (26.0-30.0)   01/26/17  05:30    


 


MCHC Differential  33.7 pg (28.0-36.0)   01/26/17  05:30    


 


RDW  12.3 % (11.5-20.0)   01/26/17  05:30    


 


Plt Count  180 Th/cmm (150-400)   01/26/17  05:30    


 


MPV  7.8 fl  01/26/17  05:30    


 


Neutrophils %  65.2 % (40.0-80.0)   01/26/17  05:30    


 


Band Neutrophils %  5 % (0-10)   01/13/17  06:16    


 


Lymphocytes %  25.0 % (20.0-50.0)   01/26/17  05:30    


 


Monocytes %  7.9 % (2.0-10.0)   01/26/17  05:30    


 


Eosinophils %  1.2 % (0.0-5.0)   01/26/17  05:30    


 


Basophils %  0.7 % (0.0-2.0)   01/26/17  05:30    


 


Neutrophils (Manual)  42 % (40-80)   01/17/17  05:45    


 


Lymphocytes  36 % (20-50)   01/17/17  05:45    


 


Monocytes  14 % (2-10)  H  01/17/17  05:45    


 


Eosinophils  2 % (0-5)   01/17/17  05:45    


 


Basophils  1 % (0-3)   01/17/17  05:45    


 


Atypical Lymphocytes  5 %  01/17/17  05:45    


 


Platelet Estimate  ADEQUATE  (NORMAL)   01/17/17  05:45    


 


Platelet Morphology  NORMAL  (NORMAL)   01/17/17  05:45    


 


RBC Morph Micro Appear  NORMAL  (NORMAL)   01/17/17  05:45    


 


PT  11.0 SECONDS (9.5-11.5)   01/23/17  07:00    


 


INR  1.10  (0.5-1.4)   01/23/17  07:00    


 


Sodium  133 mEq/L (136-145)  L  01/26/17  05:30    


 


Potassium  4.0 mEq/L (3.5-5.1)   01/26/17  05:30    


 


Chloride  105 mEq/L ()   01/26/17  05:30    


 


Carbon Dioxide  19.4 mEq/L (21.0-31.0)  L  01/26/17  05:30    


 


Anion Gap  12.6  (7.0-16.0)   01/26/17  05:30    


 


BUN  10 mg/dL (7-25)   01/26/17  05:30    


 


Creatinine  0.5 mg/dL (0.7-1.3)  L  01/26/17  05:30    


 


Est GFR ( Amer)  > 60.0 ml/min (>90)   01/26/17  05:30    


 


Est GFR (Non-Af Amer)  > 60.0 ml/min  01/26/17  05:30    


 


BUN/Creatinine Ratio  20.0   01/26/17  05:30    


 


Glucose  104 mg/dL ()   01/26/17  05:30    


 


Calcium  9.2 mg/dL (8.6-10.3)   01/26/17  05:30    


 


Total Bilirubin  0.4 mg/dL (0.3-1.0)   01/18/17  06:55    


 


AST  23 U/L (13-39)   01/18/17  06:55    


 


ALT  15 U/L (7-52)   01/18/17  06:55    


 


Alkaline Phosphatase  65 U/L ()   01/18/17  06:55    


 


Total Protein  7.6 gm/dL (6.0-8.3)   01/18/17  06:55    


 


Albumin  4.2 gm/dL (4.2-5.5)   01/18/17  06:55    


 


Globulin  3.4 gm/dL  01/18/17  06:55    


 


Albumin/Globulin Ratio  1.2  (1.0-1.8)   01/18/17  06:55    


 


Lipase  13 U/L (11-82)   01/12/17  23:39    


 


TSH  0.20 uIU/ml (0.34-5.60)  L  01/13/17  06:16    


 


Urine Source  CLEAN C   01/14/17  21:35    


 


Urine Color  YELLOW   01/14/17  21:35    


 


Urine Clarity  CLEAR  (CLEAR)   01/14/17  21:35    


 


Urine pH  >=9.0   01/14/17  21:35    


 


Ur Specific Gravity  1.020  (1.005-1.030)   01/14/17  21:35    


 


Urine Protein  TRACE mg/dL (NEGATIVE)   01/14/17  21:35    


 


Urine Glucose (UA)  NEGATIVE mg/dL (NEGATIVE)   01/14/17  21:35    


 


Urine Ketones  NEGATIVE mg/dL (NEGATIVE)   01/14/17  21:35    


 


Urine Blood  NEGATIVE  (NEGATIVE)   01/14/17  21:35    


 


Urine Nitrate  NEGATIVE  (NEGATIVE)   01/14/17  21:35    


 


Urine Bilirubin  NEGATIVE  (NEGATIVE)   01/14/17  21:35    


 


Urine Urobilinogen  0.2 E.U./dL (0.2 - 1.0)   01/14/17  21:35    


 


Ur Leukocyte Esterase  NEGATIVE  (NEGATIVE)   01/14/17  21:35    


 


Urine RBC  0-2 /hpf (0-5)  H  01/14/17  21:35    


 


Urine WBC  0-2 /hpf (0-5)   01/14/17  21:35    


 


Ur Epithelial Cells  NONE SEEN /lpf (FEW)   01/14/17  21:35    


 


Urine Bacteria  FEW /hpf (NONE SEEN)   01/14/17  21:35    














- Physical Exam


Vitals and I&O: 


 Vital Signs











Temp  97.8 F   01/26/17 12:00


 


Pulse  64   01/26/17 12:00


 


Resp  18   01/26/17 12:00


 


BP  100/55   01/26/17 12:00


 


Pulse Ox  96   01/26/17 12:00








 Intake & Output











 01/25/17 01/26/17 01/26/17





 18:59 06:59 18:59


 


Intake Total 300 100 


 


Balance 300 100 


 


Intake:   


 


  Oral 250 100 


 


  Tube Feeding 50  


 


Other:   


 


  # Voids 2 2 


 


  # Bowel Movements 3  











Active Medications: 


 Current Medications





Bisacodyl (Dulcolax 10 Mg Supp)  10 mg RC DAILY PRN


   PRN Reason: Constipation


   Stop: 03/13/17 23:52


Dextrose/Sodium Chloride (D5-0.45ns)  1,000 mls @ 70 mls/hr IV [facility][id number] [facility]


   Stop: 03/21/17 19:04


   Last Admin: 01/23/17 12:05 Dose:  70 mls/hr


Magnesium Hydroxide (Milk Of Magnesia)  30 ml PO HS ALISON


   Stop: 03/14/17 20:59


   Last Admin: 01/25/17 21:18 Dose:  30 ml


Megestrol Acetate (Megace)  400 mg PO BID ALISON


   PRN Reason: Protocol


   Stop: 03/23/17 16:59


   Last Admin: 01/26/17 09:17 Dose:  400 mg


Metoclopramide HCl (Reglan)  10 mg PO TID [facility]


   Stop: 03/21/17 14:59


   Last Admin: 01/26/17 14:10 Dose:  10 mg


Mineral Oil (Mineral Oil 30 Ml)  30 ml GT DAILY [facility]


   Stop: 03/15/17 08:59


   Last Admin: 01/26/17 09:18 Dose:  30 ml


Prochlorperazine (Compazine)  25 mg RC Q12H PRN; Protocol


   PRN Reason: Nausea


   Stop: 03/13/17 23:52


Vitamin D (Vitamin D)  400 iu GT DAILY [facility]


   Stop: 03/14/17 08:59


   Last Admin: 01/26/17 09:17 Dose:  400 iu








General: demented


HEENT: NC/AT, PERRLA


Neck: Supple, No JVD


Lungs: congested


Cardiovascular: RRR, Normal S1, Normal S2


Abdomen: soft non-tender, globular, positive bowel sound


Extremities: excoriation, contracture


Neurological: no change





- Procedures


Procedures: 


 Procedures











Procedure Code Date


 


DIAGNOSTIC COLONOSCOPY 36119 01/12/17


 


EGD BIOPSY SINGLE/MULTIPLE 01055 07/10/14


 


ESOPHAGOGASTRODUODENOSCOPY [EGD] W/CLOSED BIOPSY 45.16 07/10/14


 


INFLUENZA VACCINATION 99.52 01/30/14


 


INSERT INDWELLING CATH 57.94 12/03/12


 


INSERT INTESTINAL TUBE 96.08 12/03/12


 


INSERT PICC CATH 28735 12/03/12


 


INSERT TEMP BLADDER CATH [id number] 12/03/12


 


INSPECTION OF LOWER INTESTINAL TRACT, ENDO [id number] 01/12/17


 


OPEN AND OTHER CECECTOMY 45.72 12/03/12


 


OPEN AND OTHER RIGHT HEMICOLECTOMY 45.73 12/03/12


 


PARTIAL REMOVAL OF COLON 14415 12/03/12


 


UNLISTED PX SMALL INTESTINE 45598 12/03/12


 


VENOUS CATHETERIZATION NEC 38.93 12/03/12














Internal Medicine Assmt/Plan





- Assessment


Assessment: 





abdominal pain


 intractable vomiting


dehyration


cp


stool impaction


debility





- Plan


Plan: 





cont on ivf


on reglan 


on ppi


robert rn 


gi follow up


for BE today

## 2017-01-27 RX ADMIN — DEXTROSE AND SODIUM CHLORIDE SCH MLS/HR: 5; .45 INJECTION, SOLUTION INTRAVENOUS at 03:05

## 2017-01-27 RX ADMIN — MAGNESIUM HYDROXIDE SCH ML: 400 SUSPENSION ORAL at 20:56

## 2017-01-27 NOTE — INTERNAL MEDICINE PROG NOTE
Internal Medicine Subjective





- Subjective


Service Date: 01/27/17


Patient seen and examined:: with staff


Patient is:: awake


Per staff patient is:: no adverse event





Internal Medicine Objective





- Results


Result Diagrams: 


 01/26/17 05:30





 01/26/17 05:30


Recent Labs: 


 Laboratory Last Values











WBC  9.0 Th/cmm (4.8-10.8)  D 01/26/17  05:30    


 


RBC  4.40 Mil/cmm (4.30-5.70)   01/26/17  05:30    


 


Hgb  13.1 gm/dL (13.2-17.3)  L  01/26/17  05:30    


 


Hct  38.9 % (39.0-49.0)  L  01/26/17  05:30    


 


MCV  88.5 fl (80-99)   01/26/17  05:30    


 


MCH  29.9 pg (26.0-30.0)   01/26/17  05:30    


 


MCHC Differential  33.7 pg (28.0-36.0)   01/26/17  05:30    


 


RDW  12.3 % (11.5-20.0)   01/26/17  05:30    


 


Plt Count  180 Th/cmm (150-400)   01/26/17  05:30    


 


MPV  7.8 fl  01/26/17  05:30    


 


Neutrophils %  65.2 % (40.0-80.0)   01/26/17  05:30    


 


Band Neutrophils %  5 % (0-10)   01/13/17  06:16    


 


Lymphocytes %  25.0 % (20.0-50.0)   01/26/17  05:30    


 


Monocytes %  7.9 % (2.0-10.0)   01/26/17  05:30    


 


Eosinophils %  1.2 % (0.0-5.0)   01/26/17  05:30    


 


Basophils %  0.7 % (0.0-2.0)   01/26/17  05:30    


 


Neutrophils (Manual)  42 % (40-80)   01/17/17  05:45    


 


Lymphocytes  36 % (20-50)   01/17/17  05:45    


 


Monocytes  14 % (2-10)  H  01/17/17  05:45    


 


Eosinophils  2 % (0-5)   01/17/17  05:45    


 


Basophils  1 % (0-3)   01/17/17  05:45    


 


Atypical Lymphocytes  5 %  01/17/17  05:45    


 


Platelet Estimate  ADEQUATE  (NORMAL)   01/17/17  05:45    


 


Platelet Morphology  NORMAL  (NORMAL)   01/17/17  05:45    


 


RBC Morph Micro Appear  NORMAL  (NORMAL)   01/17/17  05:45    


 


PT  11.0 SECONDS (9.5-11.5)   01/23/17  07:00    


 


INR  1.10  (0.5-1.4)   01/23/17  07:00    


 


Sodium  133 mEq/L (136-145)  L  01/26/17  05:30    


 


Potassium  4.0 mEq/L (3.5-5.1)   01/26/17  05:30    


 


Chloride  105 mEq/L ()   01/26/17  05:30    


 


Carbon Dioxide  19.4 mEq/L (21.0-31.0)  L  01/26/17  05:30    


 


Anion Gap  12.6  (7.0-16.0)   01/26/17  05:30    


 


BUN  10 mg/dL (7-25)   01/26/17  05:30    


 


Creatinine  0.5 mg/dL (0.7-1.3)  L  01/26/17  05:30    


 


Est GFR ( Amer)  > 60.0 ml/min (>90)   01/26/17  05:30    


 


Est GFR (Non-Af Amer)  > 60.0 ml/min  01/26/17  05:30    


 


BUN/Creatinine Ratio  20.0   01/26/17  05:30    


 


Glucose  104 mg/dL ()   01/26/17  05:30    


 


Calcium  9.2 mg/dL (8.6-10.3)   01/26/17  05:30    


 


Total Bilirubin  0.4 mg/dL (0.3-1.0)   01/18/17  06:55    


 


AST  23 U/L (13-39)   01/18/17  06:55    


 


ALT  15 U/L (7-52)   01/18/17  06:55    


 


Alkaline Phosphatase  65 U/L ()   01/18/17  06:55    


 


Total Protein  7.6 gm/dL (6.0-8.3)   01/18/17  06:55    


 


Albumin  4.2 gm/dL (4.2-5.5)   01/18/17  06:55    


 


Globulin  3.4 gm/dL  01/18/17  06:55    


 


Albumin/Globulin Ratio  1.2  (1.0-1.8)   01/18/17  06:55    


 


Lipase  13 U/L (11-82)   01/12/17  23:39    


 


TSH  0.20 uIU/ml (0.34-5.60)  L  01/13/17  06:16    


 


Urine Source  CLEAN C   01/14/17  21:35    


 


Urine Color  YELLOW   01/14/17  21:35    


 


Urine Clarity  CLEAR  (CLEAR)   01/14/17  21:35    


 


Urine pH  >=9.0   01/14/17  21:35    


 


Ur Specific Gravity  1.020  (1.005-1.030)   01/14/17  21:35    


 


Urine Protein  TRACE mg/dL (NEGATIVE)   01/14/17  21:35    


 


Urine Glucose (UA)  NEGATIVE mg/dL (NEGATIVE)   01/14/17  21:35    


 


Urine Ketones  NEGATIVE mg/dL (NEGATIVE)   01/14/17  21:35    


 


Urine Blood  NEGATIVE  (NEGATIVE)   01/14/17  21:35    


 


Urine Nitrate  NEGATIVE  (NEGATIVE)   01/14/17  21:35    


 


Urine Bilirubin  NEGATIVE  (NEGATIVE)   01/14/17  21:35    


 


Urine Urobilinogen  0.2 E.U./dL (0.2 - 1.0)   01/14/17  21:35    


 


Ur Leukocyte Esterase  NEGATIVE  (NEGATIVE)   01/14/17  21:35    


 


Urine RBC  0-2 /hpf (0-5)  H  01/14/17  21:35    


 


Urine WBC  0-2 /hpf (0-5)   01/14/17  21:35    


 


Ur Epithelial Cells  NONE SEEN /lpf (FEW)   01/14/17  21:35    


 


Urine Bacteria  FEW /hpf (NONE SEEN)   01/14/17  21:35    














- Physical Exam


Vitals and I&O: 


 Vital Signs











Temp  97.5 F   01/27/17 08:00


 


Pulse  80   01/27/17 08:00


 


Resp  18   01/27/17 08:00


 


BP  127/73   01/27/17 08:00


 


Pulse Ox  96   01/27/17 08:00








 Intake & Output











 01/26/17 01/27/17 01/27/17





 18:59 06:59 18:59


 


Other:   


 


  # Voids 2  


 


  Stool Characteristics   Brown











Active Medications: 


 Current Medications





Bisacodyl (Dulcolax 10 Mg Supp)  10 mg RC DAILY PRN


   PRN Reason: Constipation


   Stop: 03/13/17 23:52


Dextrose/Sodium Chloride (D5-0.45ns)  1,000 mls @ 70 mls/hr IV .[id number] [facility]


   Stop: 03/21/17 19:04


   Last Admin: 01/27/17 03:05 Dose:  70 mls/hr


Magnesium Hydroxide (Milk Of Magnesia)  30 ml PO HS [facility]


   Stop: 03/14/17 20:59


   Last Admin: 01/26/17 21:08 Dose:  30 ml


Megestrol Acetate (Megace)  400 mg PO BID [facility]


   PRN Reason: Protocol


   Stop: 03/23/17 16:59


   Last Admin: 01/27/17 08:33 Dose:  400 mg


Metoclopramide HCl (Reglan)  10 mg PO TID [facility]


   Stop: 03/21/17 14:59


   Last Admin: 01/27/17 08:33 Dose:  10 mg


Mineral Oil (Mineral Oil 30 Ml)  30 ml GT DAILY [facility]


   Stop: 03/15/17 08:59


   Last Admin: 01/26/17 09:18 Dose:  30 ml


Prochlorperazine (Compazine)  25 mg RC Q12H PRN; Protocol


   PRN Reason: Nausea


   Stop: 03/13/17 23:52


Vitamin D (Vitamin D)  400 iu GT DAILY [facility]


   Stop: 03/14/17 08:59


   Last Admin: 01/27/17 08:33 Dose:  400 iu








General: alert


HEENT: NC/AT, PERRLA


Neck: Supple


Lungs: CTAB


Cardiovascular: RRR, Normal S1, Normal S2, without murmur


Abdomen: soft non-tender, non-distended


Extremities: clear





- Procedures


Procedures: 


 Procedures











Procedure Code Date


 


DIAGNOSTIC COLONOSCOPY 87594 01/12/17


 


EGD BIOPSY SINGLE/MULTIPLE 41733 07/10/14


 


ESOPHAGOGASTRODUODENOSCOPY [EGD] W/CLOSED BIOPSY 45.16 07/10/14


 


INFLUENZA VACCINATION 99.52 01/30/14


 


INSERT INDWELLING CATH 57.94 12/03/12


 


INSERT INTESTINAL TUBE 96.08 12/03/12


 


INSERT PICC CATH [id number] 12/03/12


 


INSERT TEMP BLADDER CATH [id number] 12/03/12


 


INSPECTION OF LOWER INTESTINAL TRACT, ENDO [id number] 01/12/17


 


OPEN AND OTHER CECECTOMY 45.72 12/03/12


 


OPEN AND OTHER RIGHT HEMICOLECTOMY 45.73 12/03/12


 


PARTIAL REMOVAL OF COLON 03495 12/03/12


 


UNLISTED PX SMALL INTESTINE 46922 12/03/12


 


VENOUS CATHETERIZATION NEC 38.93 12/03/12














Internal Medicine Assmt/Plan





- Assessment


Assessment: 





abdominal pain


intractable vomiting


dehyration


cp


stool impaction


debility








- Plan


Plan: 


awaiting for placement 


ivf for hydration


reglan prn


monitor electrolytes


cbc/bmp in am

## 2017-01-28 LAB
ANION GAP SERPL CALC-SCNC: 10.1 MMOL/L (ref 7–16)
BASOPHILS NFR BLD AUTO: 0.2 % (ref 0–2)
BUN SERPL-MCNC: 9 MG/DL (ref 7–25)
BUN/CREAT SERPL: 18
CALCIUM SERPL-MCNC: 9 MG/DL (ref 8.6–10.3)
CHLORIDE SERPL-SCNC: 107 MEQ/L (ref 98–107)
CO2 SERPL-SCNC: 24 MEQ/L (ref 21–31)
CREAT SERPL-MCNC: 0.5 MG/DL (ref 0.7–1.3)
EOSINOPHIL NFR BLD AUTO: 0.4 % (ref 0–5)
ERYTHROCYTE [DISTWIDTH] IN BLOOD BY AUTOMATED COUNT: 12.8 % (ref 11.5–20)
GLUCOSE SERPL-MCNC: 113 MG/DL (ref 70–105)
HCT VFR BLD CALC: 37.3 % (ref 39–49)
HGB BLD-MCNC: 12.8 GM/DL (ref 13.2–17.3)
LYMPHOCYTES NFR BLD AUTO: 21.7 % (ref 20–50)
MCH RBC QN AUTO: 30.3 PG (ref 26–30)
MCHC RBC AUTO-ENTMCNC: 34.4 PG (ref 28–36)
MCV RBC AUTO: 88.1 FL (ref 80–99)
MONOCYTES NFR BLD AUTO: 7.3 % (ref 2–10)
NEUTROPHILS # BLD: 5.6 TH/CMM (ref 1.8–8)
NEUTROPHILS NFR BLD AUTO: 70.4 % (ref 40–80)
PLATELET # BLD: 174 TH/CMM (ref 150–400)
PMV BLD AUTO: 7.8 FL
POTASSIUM SERPL-SCNC: 4.1 MEQ/L (ref 3.5–5.1)
RBC # BLD AUTO: 4.23 MIL/CMM (ref 4.3–5.7)
SODIUM SERPL-SCNC: 137 MEQ/L (ref 136–145)
WBC # BLD AUTO: 7.9 TH/CMM (ref 4.8–10.8)

## 2017-01-28 RX ADMIN — DEXTROSE AND SODIUM CHLORIDE SCH MLS/HR: 5; .45 INJECTION, SOLUTION INTRAVENOUS at 19:15

## 2017-01-28 RX ADMIN — DEXTROSE AND SODIUM CHLORIDE SCH MLS/HR: 5; .45 INJECTION, SOLUTION INTRAVENOUS at 19:16

## 2017-01-28 RX ADMIN — DEXTROSE AND SODIUM CHLORIDE SCH MLS/HR: 5; .45 INJECTION, SOLUTION INTRAVENOUS at 19:34

## 2017-01-28 RX ADMIN — DEXTROSE AND SODIUM CHLORIDE SCH MLS/HR: 5; .45 INJECTION, SOLUTION INTRAVENOUS at 19:27

## 2017-01-28 RX ADMIN — DEXTROSE AND SODIUM CHLORIDE SCH: 5; .45 INJECTION, SOLUTION INTRAVENOUS at 19:32

## 2017-01-28 RX ADMIN — DEXTROSE AND SODIUM CHLORIDE SCH: 5; .45 INJECTION, SOLUTION INTRAVENOUS at 19:28

## 2017-01-28 RX ADMIN — DEXTROSE AND SODIUM CHLORIDE SCH MLS/HR: 5; .45 INJECTION, SOLUTION INTRAVENOUS at 19:17

## 2017-01-28 RX ADMIN — DEXTROSE AND SODIUM CHLORIDE SCH: 5; .45 INJECTION, SOLUTION INTRAVENOUS at 19:33

## 2017-01-28 RX ADMIN — MAGNESIUM HYDROXIDE SCH ML: 400 SUSPENSION ORAL at 20:07

## 2017-01-28 NOTE — INTERNAL MEDICINE PROG NOTE
Internal Medicine Subjective





- Subjective


Service Date: 01/28/17


Patient seen and examined:: with staff


Patient is:: awake


Per staff patient is:: no adverse event





Internal Medicine Objective





- Results


Result Diagrams: 


 01/28/17 06:30





 01/28/17 06:30


Recent Labs: 


 Laboratory Last Values











WBC  7.9 Th/cmm (4.8-10.8)   01/28/17  06:30    


 


RBC  4.23 Mil/cmm (4.30-5.70)  L  01/28/17  06:30    


 


Hgb  12.8 gm/dL (13.2-17.3)  L  01/28/17  06:30    


 


Hct  37.3 % (39.0-49.0)  L  01/28/17  06:30    


 


MCV  88.1 fl (80-99)   01/28/17  06:30    


 


MCH  30.3 pg (26.0-30.0)  H  01/28/17  06:30    


 


MCHC Differential  34.4 pg (28.0-36.0)   01/28/17  06:30    


 


RDW  12.8 % (11.5-20.0)   01/28/17  06:30    


 


Plt Count  174 Th/cmm (150-400)   01/28/17  06:30    


 


MPV  7.8 fl  01/28/17  06:30    


 


Neutrophils %  70.4 % (40.0-80.0)   01/28/17  06:30    


 


Band Neutrophils %  5 % (0-10)   01/13/17  06:16    


 


Lymphocytes %  21.7 % (20.0-50.0)   01/28/17  06:30    


 


Monocytes %  7.3 % (2.0-10.0)   01/28/17  06:30    


 


Eosinophils %  0.4 % (0.0-5.0)   01/28/17  06:30    


 


Basophils %  0.2 % (0.0-2.0)   01/28/17  06:30    


 


Neutrophils (Manual)  42 % (40-80)   01/17/17  05:45    


 


Lymphocytes  36 % (20-50)   01/17/17  05:45    


 


Monocytes  14 % (2-10)  H  01/17/17  05:45    


 


Eosinophils  2 % (0-5)   01/17/17  05:45    


 


Basophils  1 % (0-3)   01/17/17  05:45    


 


Atypical Lymphocytes  5 %  01/17/17  05:45    


 


Platelet Estimate  ADEQUATE  (NORMAL)   01/17/17  05:45    


 


Platelet Morphology  NORMAL  (NORMAL)   01/17/17  05:45    


 


RBC Morph Micro Appear  NORMAL  (NORMAL)   01/17/17  05:45    


 


PT  11.0 SECONDS (9.5-11.5)   01/23/17  07:00    


 


INR  1.10  (0.5-1.4)   01/23/17  07:00    


 


Sodium  137 mEq/L (136-145)   01/28/17  06:30    


 


Potassium  4.1 mEq/L (3.5-5.1)   01/28/17  06:30    


 


Chloride  107 mEq/L ()   01/28/17  06:30    


 


Carbon Dioxide  24.0 mEq/L (21.0-31.0)   01/28/17  06:30    


 


Anion Gap  10.1  (7.0-16.0)   01/28/17  06:30    


 


BUN  9 mg/dL (7-25)   01/28/17  06:30    


 


Creatinine  0.5 mg/dL (0.7-1.3)  L  01/28/17  06:30    


 


Est GFR ( Amer)  > 60.0 ml/min (>90)   01/28/17  06:30    


 


Est GFR (Non-Af Amer)  > 60.0 ml/min  01/28/17  06:30    


 


BUN/Creatinine Ratio  18.0   01/28/17  06:30    


 


Glucose  113 mg/dL ()  H  01/28/17  06:30    


 


Calcium  9.0 mg/dL (8.6-10.3)   01/28/17  06:30    


 


Total Bilirubin  0.4 mg/dL (0.3-1.0)   01/18/17  06:55    


 


AST  23 U/L (13-39)   01/18/17  06:55    


 


ALT  15 U/L (7-52)   01/18/17  06:55    


 


Alkaline Phosphatase  65 U/L ()   01/18/17  06:55    


 


Total Protein  7.6 gm/dL (6.0-8.3)   01/18/17  06:55    


 


Albumin  4.2 gm/dL (4.2-5.5)   01/18/17  06:55    


 


Globulin  3.4 gm/dL  01/18/17  06:55    


 


Albumin/Globulin Ratio  1.2  (1.0-1.8)   01/18/17  06:55    


 


Lipase  13 U/L (11-82)   01/12/17  23:39    


 


TSH  0.20 uIU/ml (0.34-5.60)  L  01/13/17  06:16    


 


Urine Source  CLEAN C   01/14/17  21:35    


 


Urine Color  YELLOW   01/14/17  21:35    


 


Urine Clarity  CLEAR  (CLEAR)   01/14/17  21:35    


 


Urine pH  >=9.0   01/14/17  21:35    


 


Ur Specific Gravity  1.020  (1.005-1.030)   01/14/17  21:35    


 


Urine Protein  TRACE mg/dL (NEGATIVE)   01/14/17  21:35    


 


Urine Glucose (UA)  NEGATIVE mg/dL (NEGATIVE)   01/14/17  21:35    


 


Urine Ketones  NEGATIVE mg/dL (NEGATIVE)   01/14/17  21:35    


 


Urine Blood  NEGATIVE  (NEGATIVE)   01/14/17  21:35    


 


Urine Nitrate  NEGATIVE  (NEGATIVE)   01/14/17  21:35    


 


Urine Bilirubin  NEGATIVE  (NEGATIVE)   01/14/17  21:35    


 


Urine Urobilinogen  0.2 E.U./dL (0.2 - 1.0)   01/14/17  21:35    


 


Ur Leukocyte Esterase  NEGATIVE  (NEGATIVE)   01/14/17  21:35    


 


Urine RBC  0-2 /hpf (0-5)  H  01/14/17  21:35    


 


Urine WBC  0-2 /hpf (0-5)   01/14/17  21:35    


 


Ur Epithelial Cells  NONE SEEN /lpf (FEW)   01/14/17  21:35    


 


Urine Bacteria  FEW /hpf (NONE SEEN)   01/14/17  21:35    














- Physical Exam


Vitals and I&O: 


 Vital Signs











Temp  97.8 F   01/28/17 12:00


 


Pulse  80   01/28/17 12:00


 


Resp  18   01/28/17 12:00


 


BP  110/60   01/28/17 12:00


 


Pulse Ox  97   01/28/17 12:00








 Intake & Output











 01/27/17 01/28/17 01/28/17





 18:59 06:59 18:59


 


Intake Total 500 1000 


 


Balance 500 1000 


 


Intake:   


 


  Intake, IV Amount  1000 


 


    D5-0.45NS 1,000 ml @ 70  1000 





    mls/hr IV .[id number] [facility] Rx   





    #:[id number]   


 


  Oral 500  


 


Other:   


 


  # Voids 2  


 


  Stool Characteristics Brown Brown 











Active Medications: 


 Current Medications





Bisacodyl (Dulcolax 10 Mg Supp)  10 mg RC DAILY PRN


   PRN Reason: Constipation


   Stop: 03/13/17 23:52


Dextrose/Sodium Chloride (D5-0.45ns)  1,000 mls @ 70 mls/hr IV .[id number] [facility]


   Stop: 03/21/17 19:04


   Last Infusion: 01/27/17 23:10 Dose:  Infused


Magnesium Hydroxide (Milk Of Magnesia)  30 ml PO HS [facility]


   Stop: 03/14/17 20:59


   Last Admin: 01/27/17 20:56 Dose:  30 ml


Megestrol Acetate (Megace)  400 mg PO BID [facility]


   PRN Reason: Protocol


   Stop: 03/23/17 16:59


   Last Admin: 01/28/17 08:49 Dose:  400 mg


Metoclopramide HCl (Reglan)  10 mg PO TID [facility]


   Stop: 03/21/17 14:59


   Last Admin: 01/28/17 14:26 Dose:  10 mg


Mineral Oil (Mineral Oil 30 Ml)  30 ml GT DAILY [facility]


   Stop: 03/15/17 08:59


   Last Admin: 01/28/17 08:49 Dose:  30 ml


Prochlorperazine (Compazine)  25 mg RC Q12H PRN; Protocol


   PRN Reason: Nausea


   Stop: 03/13/17 23:52


Vitamin D (Vitamin D)  400 iu GT DAILY [facility]


   Stop: 03/14/17 08:59


   Last Admin: 01/28/17 08:49 Dose:  400 iu








General: alert


HEENT: NC/AT, PERRLA


Neck: Supple


Lungs: CTAB


Cardiovascular: RRR, Normal S1, Normal S2, without murmur


Abdomen: soft non-tender, non-distended, positive bowel sound


Extremities: clear


Neurological: no change





- Procedures


Procedures: 


 Procedures











Procedure Code Date


 


DIAGNOSTIC COLONOSCOPY 93881 01/12/17


 


EGD BIOPSY SINGLE/MULTIPLE 82407 07/10/14


 


ESOPHAGOGASTRODUODENOSCOPY [EGD] W/CLOSED BIOPSY 45.16 07/10/14


 


INFLUENZA VACCINATION 99.52 01/30/14


 


INSERT INDWELLING CATH 57.94 12/03/12


 


INSERT INTESTINAL TUBE 96.08 12/03/12


 


INSERT PICC CATH 93915 12/03/12


 


INSERT TEMP BLADDER CATH 55112 12/03/12


 


INSPECTION OF LOWER INTESTINAL TRACT, ENDO [id number] 01/12/17


 


OPEN AND OTHER CECECTOMY 45.72 12/03/12


 


OPEN AND OTHER RIGHT HEMICOLECTOMY 45.73 12/03/12


 


PARTIAL REMOVAL OF COLON 82938 12/03/12


 


UNLISTED PX SMALL INTESTINE 78721 12/03/12


 


VENOUS CATHETERIZATION NEC 38.93 12/03/12














Internal Medicine Assmt/Plan





- Assessment


Assessment: 





abdominal pain


intractable vomiting


dehyration


cp


stool impaction


debility








- Plan


Plan: 


awaiting for placement 


ivf for hydration


reglan prn


monitor electrolytes


cbc/bmp in am

## 2017-01-29 RX ADMIN — MAGNESIUM HYDROXIDE SCH ML: 400 SUSPENSION ORAL at 22:17

## 2017-01-29 RX ADMIN — DEXTROSE AND SODIUM CHLORIDE SCH MLS/HR: 5; .45 INJECTION, SOLUTION INTRAVENOUS at 09:09

## 2017-01-29 NOTE — INTERNAL MEDICINE PROG NOTE
Internal Medicine Subjective





- Subjective


Service Date: 01/29/17


Patient seen and examined:: with staff


Patient is:: awake


Per staff patient is:: no adverse event





Internal Medicine Objective





- Results


Result Diagrams: 


 01/28/17 06:30





 01/28/17 06:30


Recent Labs: 


 Laboratory Last Values











WBC  7.9 Th/cmm (4.8-10.8)   01/28/17  06:30    


 


RBC  4.23 Mil/cmm (4.30-5.70)  L  01/28/17  06:30    


 


Hgb  12.8 gm/dL (13.2-17.3)  L  01/28/17  06:30    


 


Hct  37.3 % (39.0-49.0)  L  01/28/17  06:30    


 


MCV  88.1 fl (80-99)   01/28/17  06:30    


 


MCH  30.3 pg (26.0-30.0)  H  01/28/17  06:30    


 


MCHC Differential  34.4 pg (28.0-36.0)   01/28/17  06:30    


 


RDW  12.8 % (11.5-20.0)   01/28/17  06:30    


 


Plt Count  174 Th/cmm (150-400)   01/28/17  06:30    


 


MPV  7.8 fl  01/28/17  06:30    


 


Neutrophils %  70.4 % (40.0-80.0)   01/28/17  06:30    


 


Band Neutrophils %  5 % (0-10)   01/13/17  06:16    


 


Lymphocytes %  21.7 % (20.0-50.0)   01/28/17  06:30    


 


Monocytes %  7.3 % (2.0-10.0)   01/28/17  06:30    


 


Eosinophils %  0.4 % (0.0-5.0)   01/28/17  06:30    


 


Basophils %  0.2 % (0.0-2.0)   01/28/17  06:30    


 


Neutrophils (Manual)  42 % (40-80)   01/17/17  05:45    


 


Lymphocytes  36 % (20-50)   01/17/17  05:45    


 


Monocytes  14 % (2-10)  H  01/17/17  05:45    


 


Eosinophils  2 % (0-5)   01/17/17  05:45    


 


Basophils  1 % (0-3)   01/17/17  05:45    


 


Atypical Lymphocytes  5 %  01/17/17  05:45    


 


Platelet Estimate  ADEQUATE  (NORMAL)   01/17/17  05:45    


 


Platelet Morphology  NORMAL  (NORMAL)   01/17/17  05:45    


 


RBC Morph Micro Appear  NORMAL  (NORMAL)   01/17/17  05:45    


 


PT  11.0 SECONDS (9.5-11.5)   01/23/17  07:00    


 


INR  1.10  (0.5-1.4)   01/23/17  07:00    


 


Sodium  137 mEq/L (136-145)   01/28/17  06:30    


 


Potassium  4.1 mEq/L (3.5-5.1)   01/28/17  06:30    


 


Chloride  107 mEq/L ()   01/28/17  06:30    


 


Carbon Dioxide  24.0 mEq/L (21.0-31.0)   01/28/17  06:30    


 


Anion Gap  10.1  (7.0-16.0)   01/28/17  06:30    


 


BUN  9 mg/dL (7-25)   01/28/17  06:30    


 


Creatinine  0.5 mg/dL (0.7-1.3)  L  01/28/17  06:30    


 


Est GFR ( Amer)  > 60.0 ml/min (>90)   01/28/17  06:30    


 


Est GFR (Non-Af Amer)  > 60.0 ml/min  01/28/17  06:30    


 


BUN/Creatinine Ratio  18.0   01/28/17  06:30    


 


Glucose  113 mg/dL ()  H  01/28/17  06:30    


 


Calcium  9.0 mg/dL (8.6-10.3)   01/28/17  06:30    


 


Total Bilirubin  0.4 mg/dL (0.3-1.0)   01/18/17  06:55    


 


AST  23 U/L (13-39)   01/18/17  06:55    


 


ALT  15 U/L (7-52)   01/18/17  06:55    


 


Alkaline Phosphatase  65 U/L ()   01/18/17  06:55    


 


Total Protein  7.6 gm/dL (6.0-8.3)   01/18/17  06:55    


 


Albumin  4.2 gm/dL (4.2-5.5)   01/18/17  06:55    


 


Globulin  3.4 gm/dL  01/18/17  06:55    


 


Albumin/Globulin Ratio  1.2  (1.0-1.8)   01/18/17  06:55    


 


Lipase  13 U/L (11-82)   01/12/17  23:39    


 


TSH  0.20 uIU/ml (0.34-5.60)  L  01/13/17  06:16    


 


Urine Source  CLEAN C   01/14/17  21:35    


 


Urine Color  YELLOW   01/14/17  21:35    


 


Urine Clarity  CLEAR  (CLEAR)   01/14/17  21:35    


 


Urine pH  >=9.0   01/14/17  21:35    


 


Ur Specific Gravity  1.020  (1.005-1.030)   01/14/17  21:35    


 


Urine Protein  TRACE mg/dL (NEGATIVE)   01/14/17  21:35    


 


Urine Glucose (UA)  NEGATIVE mg/dL (NEGATIVE)   01/14/17  21:35    


 


Urine Ketones  NEGATIVE mg/dL (NEGATIVE)   01/14/17  21:35    


 


Urine Blood  NEGATIVE  (NEGATIVE)   01/14/17  21:35    


 


Urine Nitrate  NEGATIVE  (NEGATIVE)   01/14/17  21:35    


 


Urine Bilirubin  NEGATIVE  (NEGATIVE)   01/14/17  21:35    


 


Urine Urobilinogen  0.2 E.U./dL (0.2 - 1.0)   01/14/17  21:35    


 


Ur Leukocyte Esterase  NEGATIVE  (NEGATIVE)   01/14/17  21:35    


 


Urine RBC  0-2 /hpf (0-5)  H  01/14/17  21:35    


 


Urine WBC  0-2 /hpf (0-5)   01/14/17  21:35    


 


Ur Epithelial Cells  NONE SEEN /lpf (FEW)   01/14/17  21:35    


 


Urine Bacteria  FEW /hpf (NONE SEEN)   01/14/17  21:35    














- Physical Exam


Vitals and I&O: 


 Vital Signs











Temp  98.2 F   01/29/17 15:00


 


Pulse  93   01/29/17 15:00


 


Resp  17   01/29/17 15:00


 


BP  96/50   01/29/17 15:00


 


Pulse Ox  94   01/29/17 15:00








 Intake & Output











 01/28/17 01/29/17 01/29/17





 18:59 06:59 18:59


 


Intake Total 480 22.168 950.833


 


Output Total 750  


 


Balance -270 22.168 950.833


 


Intake:   


 


  Intake, IV Amount  22.168 950.833


 


    D5-0.45NS 1,000 ml @ 70  22.168 950.833





    mls/hr IV .[id number] Replaced by [facility]   





    #:[id number]   


 


  Tube Feeding 480  


 


Output:   


 


  Urine 750  


 


  Stool 0  


 


Other:   


 


  # Voids  3 











Active Medications: 


 Current Medications





Bisacodyl (Dulcolax 10 Mg Supp)  10 mg RC DAILY PRN


   PRN Reason: Constipation


   Stop: 03/13/17 23:52


Dextrose/Sodium Chloride (D5-0.45ns)  1,000 mls @ 70 mls/hr IV .[id number] Replaced by [facility]


   Stop: 03/21/17 19:04


   Last Admin: 01/29/17 09:09 Dose:  70 mls/hr


Magnesium Hydroxide (Milk Of Magnesia)  30 ml PO HS Replaced by [facility]


   Stop: 03/14/17 20:59


   Last Admin: 01/28/17 20:07 Dose:  30 ml


Megestrol Acetate (Megace)  400 mg PO BID Replaced by [facility]


   PRN Reason: Protocol


   Stop: 03/23/17 16:59


   Last Admin: 01/29/17 17:01 Dose:  400 mg


Metoclopramide HCl (Reglan)  10 mg PO TID [facility]


   Stop: 03/21/17 14:59


   Last Admin: 01/29/17 14:51 Dose:  10 mg


Mineral Oil (Mineral Oil 30 Ml)  30 ml GT DAILY Replaced by [facility]


   Stop: 03/15/17 08:59


   Last Admin: 01/29/17 09:09 Dose:  30 ml


Prochlorperazine (Compazine)  25 mg RC Q12H PRN; Protocol


   PRN Reason: Nausea


   Stop: 03/13/17 23:52


Vitamin D (Vitamin D)  400 iu GT DAILY Replaced by [facility]


   Stop: 03/14/17 08:59


   Last Admin: 01/29/17 09:09 Dose:  400 iu








General: alert


HEENT: NC/AT, PERRLA


Neck: Supple


Lungs: CTAB


Cardiovascular: RRR, Normal S1, without murmur


Abdomen: soft non-tender, non-distended


Extremities: clear





- Procedures


Procedures: 


 Procedures











Procedure Code Date


 


DIAGNOSTIC COLONOSCOPY 78868 01/12/17


 


EGD BIOPSY SINGLE/MULTIPLE 25738 07/10/14


 


ESOPHAGOGASTRODUODENOSCOPY [EGD] W/CLOSED BIOPSY 45.16 07/10/14


 


INFLUENZA VACCINATION 99.52 01/30/14


 


INSERT INDWELLING CATH 57.94 12/03/12


 


INSERT INTESTINAL TUBE 96.08 12/03/12


 


INSERT PICC CATH 21256 12/03/12


 


INSERT TEMP BLADDER CATH 58704 12/03/12


 


INSPECTION OF LOWER INTESTINAL TRACT, ENDO [id number] 01/12/17


 


OPEN AND OTHER CECECTOMY 45.72 12/03/12


 


OPEN AND OTHER RIGHT HEMICOLECTOMY 45.73 12/03/12


 


PARTIAL REMOVAL OF COLON 15681 12/03/12


 


UNLISTED PX SMALL INTESTINE 85966 12/03/12


 


VENOUS CATHETERIZATION NEC 38.93 12/03/12














Internal Medicine Assmt/Plan





- Assessment


Assessment: 





abdominal pain


intractable vomiting


dehyration


cp


stool impaction


debility








- Plan


Plan: 


awaiting for placement 


ivf for hydration


monitor electrolytes


cbc/bmp in am

## 2017-01-30 LAB
ANION GAP SERPL CALC-SCNC: 8.1 MMOL/L (ref 7–16)
BASOPHILS NFR BLD AUTO: 0.2 % (ref 0–2)
BUN SERPL-MCNC: 10 MG/DL (ref 7–25)
BUN/CREAT SERPL: 25
CALCIUM SERPL-MCNC: 9.2 MG/DL (ref 8.6–10.3)
CHLORIDE SERPL-SCNC: 108 MEQ/L (ref 98–107)
CO2 SERPL-SCNC: 22.9 MEQ/L (ref 21–31)
CREAT SERPL-MCNC: 0.4 MG/DL (ref 0.7–1.3)
EOSINOPHIL NFR BLD AUTO: 0.4 % (ref 0–5)
ERYTHROCYTE [DISTWIDTH] IN BLOOD BY AUTOMATED COUNT: 12.9 % (ref 11.5–20)
GLUCOSE SERPL-MCNC: 111 MG/DL (ref 70–105)
HCT VFR BLD CALC: 37.1 % (ref 39–49)
HGB BLD-MCNC: 12.5 GM/DL (ref 13.2–17.3)
LYMPHOCYTES NFR BLD AUTO: 23.2 % (ref 20–50)
MCH RBC QN AUTO: 30.2 PG (ref 26–30)
MCHC RBC AUTO-ENTMCNC: 33.6 PG (ref 28–36)
MCV RBC AUTO: 89.8 FL (ref 80–99)
MONOCYTES NFR BLD AUTO: 7.9 % (ref 2–10)
NEUTROPHILS # BLD: 5.7 TH/CMM (ref 1.8–8)
NEUTROPHILS NFR BLD AUTO: 68.3 % (ref 40–80)
PLATELET # BLD: 196 TH/CMM (ref 150–400)
PMV BLD AUTO: 8 FL
POTASSIUM SERPL-SCNC: 4 MEQ/L (ref 3.5–5.1)
RBC # BLD AUTO: 4.13 MIL/CMM (ref 4.3–5.7)
SODIUM SERPL-SCNC: 135 MEQ/L (ref 136–145)
WBC # BLD AUTO: 8.3 TH/CMM (ref 4.8–10.8)

## 2017-01-30 RX ADMIN — MAGNESIUM HYDROXIDE SCH ML: 400 SUSPENSION ORAL at 22:21

## 2017-01-30 RX ADMIN — DEXTROSE AND SODIUM CHLORIDE SCH MLS/HR: 5; .45 INJECTION, SOLUTION INTRAVENOUS at 15:10

## 2017-01-31 LAB
ANION GAP SERPL CALC-SCNC: 12.3 MMOL/L (ref 7–16)
BASOPHILS NFR BLD AUTO: 1.1 % (ref 0–2)
BUN SERPL-MCNC: 10 MG/DL (ref 7–25)
BUN/CREAT SERPL: 20
CALCIUM SERPL-MCNC: 9 MG/DL (ref 8.6–10.3)
CHLORIDE SERPL-SCNC: 105 MEQ/L (ref 98–107)
CO2 SERPL-SCNC: 22.7 MEQ/L (ref 21–31)
CREAT SERPL-MCNC: 0.5 MG/DL (ref 0.7–1.3)
EOSINOPHIL NFR BLD AUTO: 0.2 % (ref 0–5)
ERYTHROCYTE [DISTWIDTH] IN BLOOD BY AUTOMATED COUNT: 12.7 % (ref 11.5–20)
GLUCOSE SERPL-MCNC: 109 MG/DL (ref 70–105)
HCT VFR BLD CALC: 37.6 % (ref 39–49)
HCV AB S/CO SERPL IA: <0.1 S/CO RATIO (ref 0–0.9)
HGB BLD-MCNC: 12.7 GM/DL (ref 13.2–17.3)
LYMPHOCYTES NFR BLD AUTO: 22 % (ref 20–50)
MCH RBC QN AUTO: 30.3 PG (ref 26–30)
MCHC RBC AUTO-ENTMCNC: 33.7 PG (ref 28–36)
MCV RBC AUTO: 90 FL (ref 80–99)
MONOCYTES NFR BLD AUTO: 8.2 % (ref 2–10)
NEUTROPHILS # BLD: 5.7 TH/CMM (ref 1.8–8)
NEUTROPHILS NFR BLD AUTO: 68.5 % (ref 40–80)
PLATELET # BLD: 191 TH/CMM (ref 150–400)
PMV BLD AUTO: 8.1 FL
POTASSIUM SERPL-SCNC: 4 MEQ/L (ref 3.5–5.1)
RBC # BLD AUTO: 4.18 MIL/CMM (ref 4.3–5.7)
SODIUM SERPL-SCNC: 136 MEQ/L (ref 136–145)
WBC # BLD AUTO: 8.3 TH/CMM (ref 4.8–10.8)

## 2017-01-31 RX ADMIN — DEXTROSE AND SODIUM CHLORIDE SCH MLS/HR: 5; .45 INJECTION, SOLUTION INTRAVENOUS at 10:18

## 2017-01-31 RX ADMIN — MAGNESIUM HYDROXIDE SCH ML: 400 SUSPENSION ORAL at 21:25

## 2017-01-31 NOTE — INTERNAL MEDICINE PROG NOTE
Internal Medicine Subjective





- Subjective


Service Date: 01/31/17


Patient seen and examined:: with staff


Patient is:: awake


Per staff patient is:: no adverse event





Internal Medicine Objective





- Results


Result Diagrams: 


 01/31/17 06:48





 01/31/17 06:48


Recent Labs: 


 Laboratory Last Values











WBC  8.3 Th/cmm (4.8-10.8)   01/31/17  06:48    


 


RBC  4.18 Mil/cmm (4.30-5.70)  L  01/31/17  06:48    


 


Hgb  12.7 gm/dL (13.2-17.3)  L  01/31/17  06:48    


 


Hct  37.6 % (39.0-49.0)  L  01/31/17  06:48    


 


MCV  90.0 fl (80-99)   01/31/17  06:48    


 


MCH  30.3 pg (26.0-30.0)  H  01/31/17  06:48    


 


MCHC Differential  33.7 pg (28.0-36.0)   01/31/17  06:48    


 


RDW  12.7 % (11.5-20.0)   01/31/17  06:48    


 


Plt Count  191 Th/cmm (150-400)   01/31/17  06:48    


 


MPV  8.1 fl  01/31/17  06:48    


 


Neutrophils %  68.5 % (40.0-80.0)   01/31/17  06:48    


 


Band Neutrophils %  5 % (0-10)   01/13/17  06:16    


 


Lymphocytes %  22.0 % (20.0-50.0)   01/31/17  06:48    


 


Monocytes %  8.2 % (2.0-10.0)   01/31/17  06:48    


 


Eosinophils %  0.2 % (0.0-5.0)   01/31/17  06:48    


 


Basophils %  1.1 % (0.0-2.0)   01/31/17  06:48    


 


Neutrophils (Manual)  42 % (40-80)   01/17/17  05:45    


 


Lymphocytes  36 % (20-50)   01/17/17  05:45    


 


Monocytes  14 % (2-10)  H  01/17/17  05:45    


 


Eosinophils  2 % (0-5)   01/17/17  05:45    


 


Basophils  1 % (0-3)   01/17/17  05:45    


 


Atypical Lymphocytes  5 %  01/17/17  05:45    


 


Platelet Estimate  ADEQUATE  (NORMAL)   01/17/17  05:45    


 


Platelet Morphology  NORMAL  (NORMAL)   01/17/17  05:45    


 


RBC Morph Micro Appear  NORMAL  (NORMAL)   01/17/17  05:45    


 


PT  11.0 SECONDS (9.5-11.5)   01/23/17  07:00    


 


INR  1.10  (0.5-1.4)   01/23/17  07:00    


 


Sodium  136 mEq/L (136-145)   01/31/17  06:48    


 


Potassium  4.0 mEq/L (3.5-5.1)   01/31/17  06:48    


 


Chloride  105 mEq/L ()   01/31/17  06:48    


 


Carbon Dioxide  22.7 mEq/L (21.0-31.0)   01/31/17  06:48    


 


Anion Gap  12.3  (7.0-16.0)   01/31/17  06:48    


 


BUN  10 mg/dL (7-25)   01/31/17  06:48    


 


Creatinine  0.5 mg/dL (0.7-1.3)  L  01/31/17  06:48    


 


Est GFR ( Amer)  > 60.0 ml/min (>90)   01/31/17  06:48    


 


Est GFR (Non-Af Amer)  > 60.0 ml/min  01/31/17  06:48    


 


BUN/Creatinine Ratio  20.0   01/31/17  06:48    


 


Glucose  109 mg/dL ()  H  01/31/17  06:48    


 


Calcium  9.0 mg/dL (8.6-10.3)   01/31/17  06:48    


 


Total Bilirubin  0.4 mg/dL (0.3-1.0)   01/18/17  06:55    


 


AST  23 U/L (13-39)   01/18/17  06:55    


 


ALT  15 U/L (7-52)   01/18/17  06:55    


 


Alkaline Phosphatase  65 U/L ()   01/18/17  06:55    


 


Total Protein  7.6 gm/dL (6.0-8.3)   01/18/17  06:55    


 


Albumin  4.2 gm/dL (4.2-5.5)   01/18/17  06:55    


 


Globulin  3.4 gm/dL  01/18/17  06:55    


 


Albumin/Globulin Ratio  1.2  (1.0-1.8)   01/18/17  06:55    


 


Lipase  13 U/L (11-82)   01/12/17  23:39    


 


TSH  0.20 uIU/ml (0.34-5.60)  L  01/13/17  06:16    


 


Urine Source  CLEAN C   01/14/17  21:35    


 


Urine Color  YELLOW   01/14/17  21:35    


 


Urine Clarity  CLEAR  (CLEAR)   01/14/17  21:35    


 


Urine pH  >=9.0   01/14/17  21:35    


 


Ur Specific Gravity  1.020  (1.005-1.030)   01/14/17  21:35    


 


Urine Protein  TRACE mg/dL (NEGATIVE)   01/14/17  21:35    


 


Urine Glucose (UA)  NEGATIVE mg/dL (NEGATIVE)   01/14/17  21:35    


 


Urine Ketones  NEGATIVE mg/dL (NEGATIVE)   01/14/17  21:35    


 


Urine Blood  NEGATIVE  (NEGATIVE)   01/14/17  21:35    


 


Urine Nitrate  NEGATIVE  (NEGATIVE)   01/14/17  21:35    


 


Urine Bilirubin  NEGATIVE  (NEGATIVE)   01/14/17  21:35    


 


Urine Urobilinogen  0.2 E.U./dL (0.2 - 1.0)   01/14/17  21:35    


 


Ur Leukocyte Esterase  NEGATIVE  (NEGATIVE)   01/14/17  21:35    


 


Urine RBC  0-2 /hpf (0-5)  H  01/14/17  21:35    


 


Urine WBC  0-2 /hpf (0-5)   01/14/17  21:35    


 


Ur Epithelial Cells  NONE SEEN /lpf (FEW)   01/14/17  21:35    


 


Urine Bacteria  FEW /hpf (NONE SEEN)   01/14/17  21:35    














- Physical Exam


Vitals and I&O: 


 Vital Signs











Temp  97.4 F   01/31/17 07:00


 


Pulse  72   01/31/17 07:00


 


Resp  18   01/31/17 08:00


 


BP  105/27   01/31/17 07:00


 


Pulse Ox  98   01/31/17 07:00








 Intake & Output











 01/30/17 01/31/17 01/31/17





 18:59 06:59 18:59


 


Intake Total 600 1000 


 


Output Total 1  


 


Balance 599 1000 


 


Intake:   


 


  Intake, IV Amount  1000 


 


    D5-0.45NS 1,000 ml @ 70  1000 





    mls/hr IV .[id number] [facility] Rx   





    #:[id number]   


 


  Oral 600  


 


Output:   


 


  Stool 1  


 


Other:   


 


  # Voids 2 3 


 


  # Bowel Movements 1  


 


  Stool Characteristics Liquid Liquid Liquid











Active Medications: 


 Current Medications





Bisacodyl (Dulcolax 10 Mg Supp)  10 mg RC DAILY PRN


   PRN Reason: Constipation


   Stop: 03/13/17 23:52


Dextrose/Sodium Chloride (D5-0.45ns)  1,000 mls @ 70 mls/hr IV .[id number] [facility]


   Stop: 03/21/17 19:04


   Last Admin: 01/31/17 10:18 Dose:  70 mls/hr


Magnesium Hydroxide (Milk Of Magnesia)  30 ml PO HS [facility]


   Stop: 03/14/17 20:59


   Last Admin: 01/30/17 22:21 Dose:  30 ml


Megestrol Acetate (Megace)  400 mg PO BID [facility]


   PRN Reason: Protocol


   Stop: 03/23/17 16:59


   Last Admin: 01/31/17 10:00 Dose:  400 mg


Metoclopramide HCl (Reglan)  10 mg PO TID [facility]


   Stop: 03/21/17 14:59


   Last Admin: 01/31/17 10:00 Dose:  10 mg


Mineral Oil (Mineral Oil 30 Ml)  30 ml GT DAILY [facility]


   Stop: 03/15/17 08:59


   Last Admin: 01/31/17 10:01 Dose:  30 ml


Prochlorperazine (Compazine)  25 mg RC Q12H PRN; Protocol


   PRN Reason: Nausea


   Stop: 03/13/17 23:52


Vitamin D (Vitamin D)  400 iu GT DAILY [facility]


   Stop: 03/14/17 08:59


   Last Admin: 01/31/17 10:00 Dose:  400 iu








General: alert


HEENT: NC/AT, PERRLA


Neck: Supple


Lungs: CTAB


Cardiovascular: RRR, Normal S1, Normal S2, without murmur


Abdomen: soft non-tender


Extremities: clear





- Procedures


Procedures: 


 Procedures











Procedure Code Date


 


DIAGNOSTIC COLONOSCOPY 53593 01/12/17


 


EGD BIOPSY SINGLE/MULTIPLE 67477 07/10/14


 


ESOPHAGOGASTRODUODENOSCOPY [EGD] W/CLOSED BIOPSY 45.16 07/10/14


 


INFLUENZA VACCINATION 99.52 01/30/14


 


INSERT INDWELLING CATH 57.94 12/03/12


 


INSERT INTESTINAL TUBE 96.08 12/03/12


 


INSERT PICC CATH 26094 12/03/12


 


INSERT TEMP BLADDER CATH 46074 12/03/12


 


INSPECTION OF LOWER INTESTINAL TRACT, ENDO [id number] 01/12/17


 


OPEN AND OTHER CECECTOMY 45.72 12/03/12


 


OPEN AND OTHER RIGHT HEMICOLECTOMY 45.73 12/03/12


 


PARTIAL REMOVAL OF COLON 54825 12/03/12


 


UNLISTED PX SMALL INTESTINE 47106 12/03/12


 


VENOUS CATHETERIZATION NEC 38.93 12/03/12














Internal Medicine Assmt/Plan





- Assessment


Assessment: 





abdominal pain


intractable vomiting


dehyration


cp


stool impaction


debility








- Plan


Plan: 


awaiting for placement 


ivf for hydration


monitor electrolytes


cbc/bmp in am

## 2017-02-01 LAB
ANION GAP SERPL CALC-SCNC: 9.9 MMOL/L (ref 7–16)
BASOPHILS NFR BLD AUTO: 0.2 % (ref 0–2)
BUN SERPL-MCNC: 11 MG/DL (ref 7–25)
BUN/CREAT SERPL: 22
CALCIUM SERPL-MCNC: 8.9 MG/DL (ref 8.6–10.3)
CHLORIDE SERPL-SCNC: 105 MEQ/L (ref 98–107)
CO2 SERPL-SCNC: 21.3 MEQ/L (ref 21–31)
CREAT SERPL-MCNC: 0.5 MG/DL (ref 0.7–1.3)
EOSINOPHIL NFR BLD AUTO: 0.3 % (ref 0–5)
ERYTHROCYTE [DISTWIDTH] IN BLOOD BY AUTOMATED COUNT: 13 % (ref 11.5–20)
GLUCOSE SERPL-MCNC: 114 MG/DL (ref 70–105)
HCT VFR BLD CALC: 37.2 % (ref 39–49)
HGB BLD-MCNC: 12.5 GM/DL (ref 13.2–17.3)
LYMPHOCYTES NFR BLD AUTO: 21.4 % (ref 20–50)
MCH RBC QN AUTO: 30.3 PG (ref 26–30)
MCHC RBC AUTO-ENTMCNC: 33.6 PG (ref 28–36)
MCV RBC AUTO: 90.4 FL (ref 80–99)
MONOCYTES NFR BLD AUTO: 8.4 % (ref 2–10)
NEUTROPHILS # BLD: 6 TH/CMM (ref 1.8–8)
NEUTROPHILS NFR BLD AUTO: 69.7 % (ref 40–80)
PLATELET # BLD: 197 TH/CMM (ref 150–400)
PMV BLD AUTO: 7.8 FL
POTASSIUM SERPL-SCNC: 4.2 MEQ/L (ref 3.5–5.1)
RBC # BLD AUTO: 4.11 MIL/CMM (ref 4.3–5.7)
SODIUM SERPL-SCNC: 132 MEQ/L (ref 136–145)
WBC # BLD AUTO: 8.5 TH/CMM (ref 4.8–10.8)

## 2017-02-01 RX ADMIN — MAGNESIUM HYDROXIDE SCH ML: 400 SUSPENSION ORAL at 20:45

## 2017-02-01 RX ADMIN — DEXTROSE AND SODIUM CHLORIDE SCH MLS/HR: 5; .45 INJECTION, SOLUTION INTRAVENOUS at 05:24

## 2017-02-01 NOTE — INTERNAL MEDICINE PROG NOTE
Internal Medicine Subjective





- Subjective


Service Date: 02/01/17


Patient seen and examined:: with staff


Patient is:: awake


Per staff patient is:: no adverse event





Internal Medicine Objective





- Results


Result Diagrams: 


 02/01/17 06:19





 02/01/17 06:19


Recent Labs: 


 Laboratory Last Values











WBC  8.5 Th/cmm (4.8-10.8)   02/01/17  06:19    


 


RBC  4.11 Mil/cmm (4.30-5.70)  L  02/01/17  06:19    


 


Hgb  12.5 gm/dL (13.2-17.3)  L  02/01/17  06:19    


 


Hct  37.2 % (39.0-49.0)  L  02/01/17  06:19    


 


MCV  90.4 fl (80-99)   02/01/17  06:19    


 


MCH  30.3 pg (26.0-30.0)  H  02/01/17  06:19    


 


MCHC Differential  33.6 pg (28.0-36.0)   02/01/17  06:19    


 


RDW  13.0 % (11.5-20.0)   02/01/17  06:19    


 


Plt Count  197 Th/cmm (150-400)   02/01/17  06:19    


 


MPV  7.8 fl  02/01/17  06:19    


 


Neutrophils %  69.7 % (40.0-80.0)   02/01/17  06:19    


 


Band Neutrophils %  5 % (0-10)   01/13/17  06:16    


 


Lymphocytes %  21.4 % (20.0-50.0)   02/01/17  06:19    


 


Monocytes %  8.4 % (2.0-10.0)   02/01/17  06:19    


 


Eosinophils %  0.3 % (0.0-5.0)   02/01/17  06:19    


 


Basophils %  0.2 % (0.0-2.0)   02/01/17  06:19    


 


Neutrophils (Manual)  42 % (40-80)   01/17/17  05:45    


 


Lymphocytes  36 % (20-50)   01/17/17  05:45    


 


Monocytes  14 % (2-10)  H  01/17/17  05:45    


 


Eosinophils  2 % (0-5)   01/17/17  05:45    


 


Basophils  1 % (0-3)   01/17/17  05:45    


 


Atypical Lymphocytes  5 %  01/17/17  05:45    


 


Platelet Estimate  ADEQUATE  (NORMAL)   01/17/17  05:45    


 


Platelet Morphology  NORMAL  (NORMAL)   01/17/17  05:45    


 


RBC Morph Micro Appear  NORMAL  (NORMAL)   01/17/17  05:45    


 


PT  11.0 SECONDS (9.5-11.5)   01/23/17  07:00    


 


INR  1.10  (0.5-1.4)   01/23/17  07:00    


 


Sodium  132 mEq/L (136-145)  L  02/01/17  06:19    


 


Potassium  4.2 mEq/L (3.5-5.1)   02/01/17  06:19    


 


Chloride  105 mEq/L ()   02/01/17  06:19    


 


Carbon Dioxide  21.3 mEq/L (21.0-31.0)   02/01/17  06:19    


 


Anion Gap  9.9  (7.0-16.0)   02/01/17  06:19    


 


BUN  11 mg/dL (7-25)   02/01/17  06:19    


 


Creatinine  0.5 mg/dL (0.7-1.3)  L  02/01/17  06:19    


 


Est GFR ( Amer)  > 60.0 ml/min (>90)   02/01/17  06:19    


 


Est GFR (Non-Af Amer)  > 60.0 ml/min  02/01/17  06:19    


 


BUN/Creatinine Ratio  22.0   02/01/17  06:19    


 


Glucose  114 mg/dL ()  H  02/01/17  06:19    


 


Calcium  8.9 mg/dL (8.6-10.3)   02/01/17  06:19    


 


Total Bilirubin  0.4 mg/dL (0.3-1.0)   01/18/17  06:55    


 


AST  23 U/L (13-39)   01/18/17  06:55    


 


ALT  15 U/L (7-52)   01/18/17  06:55    


 


Alkaline Phosphatase  65 U/L ()   01/18/17  06:55    


 


Total Protein  7.6 gm/dL (6.0-8.3)   01/18/17  06:55    


 


Albumin  4.2 gm/dL (4.2-5.5)   01/18/17  06:55    


 


Globulin  3.4 gm/dL  01/18/17  06:55    


 


Albumin/Globulin Ratio  1.2  (1.0-1.8)   01/18/17  06:55    


 


Lipase  13 U/L (11-82)   01/12/17  23:39    


 


TSH  0.20 uIU/ml (0.34-5.60)  L  01/13/17  06:16    


 


Urine Source  CLEAN C   01/14/17  21:35    


 


Urine Color  YELLOW   01/14/17  21:35    


 


Urine Clarity  CLEAR  (CLEAR)   01/14/17  21:35    


 


Urine pH  >=9.0   01/14/17  21:35    


 


Ur Specific Gravity  1.020  (1.005-1.030)   01/14/17  21:35    


 


Urine Protein  TRACE mg/dL (NEGATIVE)   01/14/17  21:35    


 


Urine Glucose (UA)  NEGATIVE mg/dL (NEGATIVE)   01/14/17  21:35    


 


Urine Ketones  NEGATIVE mg/dL (NEGATIVE)   01/14/17  21:35    


 


Urine Blood  NEGATIVE  (NEGATIVE)   01/14/17  21:35    


 


Urine Nitrate  NEGATIVE  (NEGATIVE)   01/14/17  21:35    


 


Urine Bilirubin  NEGATIVE  (NEGATIVE)   01/14/17  21:35    


 


Urine Urobilinogen  0.2 E.U./dL (0.2 - 1.0)   01/14/17  21:35    


 


Ur Leukocyte Esterase  NEGATIVE  (NEGATIVE)   01/14/17  21:35    


 


Urine RBC  0-2 /hpf (0-5)  H  01/14/17  21:35    


 


Urine WBC  0-2 /hpf (0-5)   01/14/17  21:35    


 


Ur Epithelial Cells  NONE SEEN /lpf (FEW)   01/14/17  21:35    


 


Urine Bacteria  FEW /hpf (NONE SEEN)   01/14/17  21:35    


 


Hepatitis A IgM Ab  Negative  (Negative)   01/30/17  06:50    


 


Hep Bs Antigen  Negative  (Negative)   01/30/17  06:50    


 


Hep B Core IgM Ab  Negative  (Negative)   01/30/17  06:50    


 


Hepatitis C Antibody  <0.1 s/co ratio (0.0-0.9)   01/30/17  06:50    














- Physical Exam


Vitals and I&O: 


 Vital Signs











Temp  97.6 F   02/01/17 08:00


 


Pulse  66   02/01/17 08:00


 


Resp  18   02/01/17 08:00


 


BP  108/64   02/01/17 08:00


 


Pulse Ox  95   02/01/17 08:00








 Intake & Output











 01/31/17 02/01/17 02/01/17





 18:59 06:59 18:59


 


Intake Total  1000 


 


Balance  1000 


 


Intake:   


 


  Intake, IV Amount  1000 


 


    D5-0.45NS 1,000 ml @ 70  1000 





    mls/hr IV .[id number] [facility] Rx   





    #:[id number]   


 


Other:   


 


  # Voids 2  


 


  # Bowel Movements 1  


 


  Stool Characteristics Liquid Liquid Liquid











Active Medications: 


 Current Medications





Bisacodyl (Dulcolax 10 Mg Supp)  10 mg RC DAILY PRN


   PRN Reason: Constipation


   Stop: 03/13/17 23:52


   Last Admin: 01/31/17 16:12 Dose:  10 mg


Dextrose/Sodium Chloride (D5-0.45ns)  1,000 mls @ 70 mls/hr IV .[id number] [facility]


   Stop: 03/21/17 19:04


   Last Admin: 02/01/17 05:24 Dose:  70 mls/hr


Magnesium Hydroxide (Milk Of Magnesia)  30 ml PO HS [facility]


   Stop: 03/14/17 20:59


   Last Admin: 01/31/17 21:25 Dose:  30 ml


Megestrol Acetate (Megace)  400 mg PO BID [facility]


   PRN Reason: Protocol


   Stop: 03/23/17 16:59


   Last Admin: 02/01/17 09:22 Dose:  400 mg


Metoclopramide HCl (Reglan)  10 mg PO TID [facility]


   Stop: 03/21/17 14:59


   Last Admin: 02/01/17 09:22 Dose:  10 mg


Mineral Oil (Mineral Oil 30 Ml)  30 ml GT DAILY [facility]


   Stop: 03/15/17 08:59


   Last Admin: 02/01/17 09:26 Dose:  30 ml


Prochlorperazine (Compazine)  25 mg RC Q12H PRN; Protocol


   PRN Reason: Nausea


   Stop: 03/13/17 23:52


Vitamin D (Vitamin D)  400 iu GT DAILY [facility]


   Stop: 03/14/17 08:59


   Last Admin: 02/01/17 09:22 Dose:  400 iu








General: alert


HEENT: NC/AT, PERRLA


Neck: Supple


Lungs: CTAB


Cardiovascular: RRR, Normal S1, Normal S2, without murmur


Abdomen: soft non-tender, non-distended, positive bowel sound


Extremities: clear


Neurological: no change





- Procedures


Procedures: 


 Procedures











Procedure Code Date


 


DIAGNOSTIC COLONOSCOPY 44924 01/12/17


 


EGD BIOPSY SINGLE/MULTIPLE 79551 07/10/14


 


ESOPHAGOGASTRODUODENOSCOPY [EGD] W/CLOSED BIOPSY 45.16 07/10/14


 


INFLUENZA VACCINATION 99.52 01/30/14


 


INSERT INDWELLING CATH 57.94 12/03/12


 


INSERT INTESTINAL TUBE 96.08 12/03/12


 


INSERT PICC CATH [id number] 12/03/12


 


INSERT TEMP BLADDER CATH [id number] 12/03/12


 


INSPECTION OF LOWER INTESTINAL TRACT, ENDO [id number] 01/12/17


 


OPEN AND OTHER CECECTOMY 45.72 12/03/12


 


OPEN AND OTHER RIGHT HEMICOLECTOMY 45.73 12/03/12


 


PARTIAL REMOVAL OF COLON 59567 12/03/12


 


UNLISTED PX SMALL INTESTINE 58344 12/03/12


 


VENOUS CATHETERIZATION NEC 38.93 12/03/12














Internal Medicine Assmt/Plan





- Assessment


Assessment: 





abdominal pain


intractable vomiting


dehyration


cp


stool impaction


debility








- Plan


Plan: 


awaiting for placement 


ivf for hydration


monitor electrolytes


cbc/bmp in am

## 2017-02-02 LAB
ANION GAP SERPL CALC-SCNC: 11.4 MMOL/L (ref 7–16)
BASOPHILS NFR BLD AUTO: 0.1 % (ref 0–2)
BUN SERPL-MCNC: 13 MG/DL (ref 7–25)
BUN/CREAT SERPL: 21.7
CALCIUM SERPL-MCNC: 9.3 MG/DL (ref 8.6–10.3)
CHLORIDE SERPL-SCNC: 104 MEQ/L (ref 98–107)
CO2 SERPL-SCNC: 23.9 MEQ/L (ref 21–31)
CREAT SERPL-MCNC: 0.6 MG/DL (ref 0.7–1.3)
EOSINOPHIL NFR BLD AUTO: 0.3 % (ref 0–5)
ERYTHROCYTE [DISTWIDTH] IN BLOOD BY AUTOMATED COUNT: 13.1 % (ref 11.5–20)
GLUCOSE SERPL-MCNC: 107 MG/DL (ref 70–105)
HCT VFR BLD CALC: 38.8 % (ref 39–49)
HGB BLD-MCNC: 12.9 GM/DL (ref 13.2–17.3)
LYMPHOCYTES NFR BLD AUTO: 18.9 % (ref 20–50)
MCH RBC QN AUTO: 30.4 PG (ref 26–30)
MCHC RBC AUTO-ENTMCNC: 33.3 PG (ref 28–36)
MCV RBC AUTO: 91.4 FL (ref 80–99)
MONOCYTES NFR BLD AUTO: 7.1 % (ref 2–10)
NEUTROPHILS # BLD: 7.1 TH/CMM (ref 1.8–8)
NEUTROPHILS NFR BLD AUTO: 73.6 % (ref 40–80)
PLATELET # BLD: 224 TH/CMM (ref 150–400)
PMV BLD AUTO: 7.6 FL
POTASSIUM SERPL-SCNC: 4.3 MEQ/L (ref 3.5–5.1)
RBC # BLD AUTO: 4.25 MIL/CMM (ref 4.3–5.7)
SODIUM SERPL-SCNC: 135 MEQ/L (ref 136–145)
WBC # BLD AUTO: 9.6 TH/CMM (ref 4.8–10.8)

## 2017-02-02 NOTE — DISCHARGE SUMMARY
CHIEF COMPLAINT:  Vomiting.



FINAL DIAGNOSES:  Abdominal pain with intractable vomiting, which has resolved,

seizures, spastic quadriplegia, mental retardation, bedbound, anemia, protein

calorie malnutrition.



HISTORY OF PRESENT ILLNESS:  This is a 31-year-old ____ male with cerebral

palsy, mental retardation, was admitted from nursing facility secondary to

abdominal pain, intractable vomiting.  The patient was seen by Dr Ames for GI.



HOSPITAL COURSE:  The patient had endoscopy.  The patient's condition did

improve.  There was an issue of aspiration.  The patient's family was adamant

that they wanted to continue with oral feeding, ____.  The patient also has

G-tube, which is main source of his nutrition.  There was some issue with

placement.  The patient was not accepted back there in the previous extended

care facility, will need to find a placement for the patient. 



CONDITION ON DISCHARGE:  Fair.



DISCHARGE INSTRUCTIONS:  The patient to continue with current medical regimen. 

We will place the patient on aspiration precautions.  This was related to the

patient's physician.  The patient will be followed by the medical director. 

Family were agreeable.  ____ was also agreeable with the above plan.





DD: 02/02/2017 13:29

DT: 02/02/2017 21:23

JOB# [id number]  [id number]

## 2017-03-05 ENCOUNTER — HOSPITAL ENCOUNTER (INPATIENT)
Dept: HOSPITAL 36 - ER | Age: 32
LOS: 3 days | Discharge: NURSING, REHAB OR LONG-TERM CARE | DRG: 254 | End: 2017-03-08
Attending: INTERNAL MEDICINE | Admitting: INTERNAL MEDICINE
Payer: MEDICAID

## 2017-03-05 DIAGNOSIS — K56.7: ICD-10-CM

## 2017-03-05 DIAGNOSIS — M19.90: ICD-10-CM

## 2017-03-05 DIAGNOSIS — F03.90: ICD-10-CM

## 2017-03-05 DIAGNOSIS — R13.10: ICD-10-CM

## 2017-03-05 DIAGNOSIS — M81.0: ICD-10-CM

## 2017-03-05 DIAGNOSIS — G80.0: ICD-10-CM

## 2017-03-05 DIAGNOSIS — K21.9: ICD-10-CM

## 2017-03-05 DIAGNOSIS — K59.00: Primary | ICD-10-CM

## 2017-03-05 DIAGNOSIS — F72: ICD-10-CM

## 2017-03-05 DIAGNOSIS — Z93.1: ICD-10-CM

## 2017-03-05 LAB
ALBUMIN/GLOB SERPL: 1.3 {RATIO} (ref 1–1.8)
ALP SERPL-CCNC: 65 U/L (ref 34–104)
ALT SERPL-CCNC: 12 U/L (ref 7–52)
ANION GAP SERPL CALC-SCNC: 13.7 MMOL/L (ref 7–16)
AST SERPL-CCNC: 19 U/L (ref 13–39)
BILIRUB SERPL-MCNC: 0.4 MG/DL (ref 0.3–1)
BUN SERPL-MCNC: 24 MG/DL (ref 7–25)
BUN/CREAT SERPL: 40
CALCIUM SERPL-MCNC: 10 MG/DL (ref 8.6–10.3)
CHLORIDE SERPL-SCNC: 108 MEQ/L (ref 98–107)
CO2 SERPL-SCNC: 23.1 MEQ/L (ref 21–31)
CREAT SERPL-MCNC: 0.6 MG/DL (ref 0.7–1.3)
GLOBULIN SER-MCNC: 3.6 GM/DL
GLUCOSE SERPL-MCNC: 91 MG/DL (ref 70–105)
INR PPP: 1.09 (ref 0.5–1.4)
POTASSIUM SERPL-SCNC: 3.8 MEQ/L (ref 3.5–5.1)
PROTHROMBIN TIME: 11.3 SECONDS (ref 9.5–11.5)
SODIUM SERPL-SCNC: 141 MEQ/L (ref 136–145)

## 2017-03-05 PROCEDURE — C9113 INJ PANTOPRAZOLE SODIUM, VIA: HCPCS

## 2017-03-05 PROCEDURE — Z7610: HCPCS

## 2017-03-05 NOTE — ED PHYSICIAN CHART
Chief Complaint/HPI





- Patient Information


Date Seen:: 03/05/17


Time Seen:: 22:40


Chief Complaint:: Constipation


History of Present Illness:: 





Brought in by ambulance from nursing facility because pt has not had a BM for 

at least one day. Pt reportedly had prior h/o small bowel obstruction. Pt has 

profound intellectual disabilities. H & P are limited because pt is unable to 

cooperate. Info is primarily from review of limited transfer documents.


Allergies:: 


 Allergies











Allergy/AdvReac Type Severity Reaction Status Date / Time


 


No Known Allergies Allergy   Verified 01/12/17 23:32











Vitals:: 


 Vital Signs - 8 hr











  03/05/17





  22:51


 


Temp 98.2 F


 


HR 84


 


RR 17


 


/69


 


O2 Sat % 98











Historian:: Medical Records (from transferrng facility.)


Family MD/PCP:: 





Dr. Matos


LMP:: 





N/A


Review:: Nurse's Note Reviewed, Transfer documents Reviewed





Review of Systems





- Review of Systems


General/Constitutional: Other (Pt does not cooperate for ROS.)





Past Medical History





- Past Medical History


Past Medical History: PUD/GERD, Arthritis, Dementia, Other (cerebral palsy. 

spastic quadraplegia, osteoporosis)


Family History: Other (Pt is unable to cooperate to provide info on FHx.)


Social History: Care Facility, Other (Pt is unable to provide info on SHx.)


Surgical History: PEG/GTube


Psychiatricy History: Dementia


Medication: Reviewed





Family Medical History





- Family Member


  ** Mother


History Unknown: Yes


Ethnicity: Non-


Living Status: Still Living





Physical Exam





- Physical Examination


General/Constitutional: Awake, Well-developed, well-nourished, Alert, No 

distress, Non-toxic appearing


Other Gen/Cons comments:: 





Alert, responsive to voice and tactile stimuli at times. Breathes comfortably. 

Pt is not cooperative.


Head: Atraumatic


Eyes: Lids, conjuctiva normal


Skin: No ecchymosis, Well hydrated, No lymphadenopathy


ENMT: Oropharynx nl


Neck: Nontender, Full ROM w/o pain, No JVD, No nuchal rigidity, No mass, No 

stridor


Respiratory: Nl effort/Exclusion, Clear to Auscultation, No Wheeze/Rhonchi/Rales


Cardio Vascular: RRR, No murmur, gallop, rubs


GI: No tenderness/rebounding/guarding, No organomegaly, No hernia, Nondistended

, No mass/bruits, No McBurney tenderness


Other GI comments:: 





Well healed midline longitudinal surgical scar noticed. G-tube at LUQ. Slightly 

decrease in BS throughout. Abdominal exam is limited because pt cannot 

cooperate.


Other Extremities comments:: 





Contractures noticed in all 4 extremities.


Other Neuro/Psych comments:: 





Alert. Pt is responsive to voice and tactile stimuli at times. Occasional 

spontaneous movements with contractures noticed in all 4 extremities. 





Labs/Radiology/EKG Results





- Lab Results


Results: 





 Laboratory Tests











  03/05/17 03/05/17





  23:00 23:00


 


PT   11.3


 


INR   1.09


 


PTT (Actin FS)   24.9 L


 


Sodium  141 


 


Potassium  3.8 


 


Chloride  108 H 


 


Carbon Dioxide  23.1 


 


Anion Gap  13.7 


 


BUN  24 


 


Creatinine  0.6 L 


 


Est GFR ( Amer)  > 60.0 


 


Est GFR (Non-Af Amer)  > 60.0 


 


BUN/Creatinine Ratio  40.0 


 


Glucose  91 


 


Calcium  10.0 


 


Total Bilirubin  0.4 


 


AST  19 


 


ALT  12 


 


Alkaline Phosphatase  65 


 


Total Protein  8.1 


 


Albumin  4.5 


 


Globulin  3.6 


 


Albumin/Globulin Ratio  1.3 








Pending lab result: CBC





- Radiology Results


Results: 





CT abdomen/pelvis without contrast:pending.





ED Septic Shock





- .


Is Septic Shock (SBP<90, OR Lactate>4 mmol\L) present?: No





- <6hrs of presentation:


Vital Signs: 


 Vital Signs - 8 hr











  03/05/17





  22:51


 


Temp 98.2 F


 


HR 84


 


RR 17


 


/69


 


O2 Sat % 98














Reassessment (Disposition)





- Reassessment


Reassessment:: 





0012 Pt remains stable. H & P and available lab findings have been reviewed. 

Dr. Matos decided to admit pt to Med[facility] for observation. He will follow 

on pending lab results and abdominal/pelvic CT report.


Reassessment Condition:: Improved





- Diagnosis


Diagnosis:: 





Prior h/o small bowel obstruction by hx with constipation for at least one day. 

Stable.


h/o cerebral palsy with dementia and spastic quadraplegia. Stable.





- Patient Disposition


Admitted to:: Med/Surg


Admitting Medical Physician:: Darien Matos


Time:: 00:20


Condition at Disposition:: Stable, Improved

## 2017-03-06 LAB
ALBUMIN/GLOB SERPL: 1.3 {RATIO} (ref 1–1.8)
ALP SERPL-CCNC: 61 U/L (ref 34–104)
ALT SERPL-CCNC: 10 U/L (ref 7–52)
ANION GAP SERPL CALC-SCNC: 15.7 MMOL/L (ref 7–16)
AST SERPL-CCNC: 21 U/L (ref 13–39)
BASOPHILS NFR BLD AUTO: 0.4 % (ref 0–2)
BASOPHILS NFR BLD AUTO: 1 % (ref 0–2)
BILIRUB SERPL-MCNC: 0.4 MG/DL (ref 0.3–1)
BUN SERPL-MCNC: 25 MG/DL (ref 7–25)
BUN/CREAT SERPL: 41.7
CALCIUM SERPL-MCNC: 9.4 MG/DL (ref 8.6–10.3)
CHLORIDE SERPL-SCNC: 108 MEQ/L (ref 98–107)
CO2 SERPL-SCNC: 22.8 MEQ/L (ref 21–31)
CREAT SERPL-MCNC: 0.6 MG/DL (ref 0.7–1.3)
EOSINOPHIL NFR BLD AUTO: 2.3 % (ref 0–5)
EOSINOPHIL NFR BLD AUTO: 2.3 % (ref 0–5)
ERYTHROCYTE [DISTWIDTH] IN BLOOD BY AUTOMATED COUNT: 12.8 % (ref 11.5–20)
ERYTHROCYTE [DISTWIDTH] IN BLOOD BY AUTOMATED COUNT: 12.9 % (ref 11.5–20)
GLOBULIN SER-MCNC: 3.4 GM/DL
GLUCOSE SERPL-MCNC: 91 MG/DL (ref 70–105)
HCT VFR BLD CALC: 41.4 % (ref 39–49)
HCT VFR BLD CALC: 43.4 % (ref 39–49)
HGB BLD-MCNC: 13.6 GM/DL (ref 13.2–17.3)
HGB BLD-MCNC: 14 GM/DL (ref 13.2–17.3)
LYMPHOCYTES NFR BLD AUTO: 26.6 % (ref 20–50)
LYMPHOCYTES NFR BLD AUTO: 35.3 % (ref 20–50)
MAGNESIUM SERPL-MCNC: 2.4 MG/DL (ref 1.9–2.7)
MCH RBC QN AUTO: 29.4 PG (ref 26–30)
MCH RBC QN AUTO: 29.5 PG (ref 26–30)
MCHC RBC AUTO-ENTMCNC: 32.3 PG (ref 28–36)
MCHC RBC AUTO-ENTMCNC: 32.9 PG (ref 28–36)
MCV RBC AUTO: 89.6 FL (ref 80–99)
MCV RBC AUTO: 90.9 FL (ref 80–99)
MONOCYTES NFR BLD AUTO: 6.2 % (ref 2–10)
MONOCYTES NFR BLD AUTO: 7.4 % (ref 2–10)
NEUTROPHILS # BLD: 4.9 TH/CMM (ref 1.8–8)
NEUTROPHILS # BLD: 5.2 TH/CMM (ref 1.8–8)
NEUTROPHILS NFR BLD AUTO: 54.6 % (ref 40–80)
NEUTROPHILS NFR BLD AUTO: 63.9 % (ref 40–80)
PLATELET # BLD: 170 TH/CMM (ref 150–400)
PLATELET # BLD: 211 TH/CMM (ref 150–400)
PMV BLD AUTO: 7.2 FL
PMV BLD AUTO: 7.6 FL
POTASSIUM SERPL-SCNC: 4.5 MEQ/L (ref 3.5–5.1)
RBC # BLD AUTO: 4.62 MIL/CMM (ref 4.3–5.7)
RBC # BLD AUTO: 4.77 MIL/CMM (ref 4.3–5.7)
SODIUM SERPL-SCNC: 142 MEQ/L (ref 136–145)
WBC # BLD AUTO: 8.2 TH/CMM (ref 4.8–10.8)
WBC # BLD AUTO: 9 TH/CMM (ref 4.8–10.8)

## 2017-03-06 RX ADMIN — DEXTROSE AND SODIUM CHLORIDE SCH MLS/HR: 5; .45 INJECTION, SOLUTION INTRAVENOUS at 18:40

## 2017-03-06 RX ADMIN — DEXTROSE AND SODIUM CHLORIDE SCH MLS/HR: 5; .45 INJECTION, SOLUTION INTRAVENOUS at 05:06

## 2017-03-06 NOTE — GENERAL PROGRESS NOTE
Subjective





- Review of Systems


Service Date: 03/06/17


Events since last encounter: 





CT scan noted


Labs normal


PE: abdomen is soft, scar from previous surgery


Plan: SBO series





Objective





- Results


Result Diagrams: 


 03/06/17 07:20





 03/06/17 07:20


Recent Labs: 


 Laboratory Last Values











WBC  9.0 Th/cmm (4.8-10.8)   03/06/17  07:20    


 


RBC  4.62 Mil/cmm (4.30-5.70)   03/06/17  07:20    


 


Hgb  13.6 gm/dL (13.2-17.3)   03/06/17  07:20    


 


Hct  41.4 % (39.0-49.0)   03/06/17  07:20    


 


MCV  89.6 fl (80-99)   03/06/17  07:20    


 


MCH  29.5 pg (26.0-30.0)   03/06/17  07:20    


 


MCHC Differential  32.9 pg (28.0-36.0)   03/06/17  07:20    


 


RDW  12.8 % (11.5-20.0)   03/06/17  07:20    


 


Plt Count  170 Th/cmm (150-400)   03/06/17  07:20    


 


MPV  7.6 fl  03/06/17  07:20    


 


Neutrophils %  54.6 % (40.0-80.0)   03/06/17  07:20    


 


Lymphocytes %  35.3 % (20.0-50.0)   03/06/17  07:20    


 


Monocytes %  7.4 % (2.0-10.0)   03/06/17  07:20    


 


Eosinophils %  2.3 % (0.0-5.0)   03/06/17  07:20    


 


Basophils %  0.4 % (0.0-2.0)   03/06/17  07:20    


 


PT  11.3 SECONDS (9.5-11.5)   03/05/17  23:00    


 


INR  1.09  (0.5-1.4)   03/05/17  23:00    


 


PTT (Actin FS)  24.9 SECONDS (26.0-38.0)  L  03/05/17  23:00    


 


Sodium  142 mEq/L (136-145)   03/06/17  07:20    


 


Potassium  4.5 mEq/L (3.5-5.1)   03/06/17  07:20    


 


Chloride  108 mEq/L ()  H  03/06/17  07:20    


 


Carbon Dioxide  22.8 mEq/L (21.0-31.0)   03/06/17  07:20    


 


Anion Gap  15.7  (7.0-16.0)   03/06/17  07:20    


 


BUN  25 mg/dL (7-25)   03/06/17  07:20    


 


Creatinine  0.6 mg/dL (0.7-1.3)  L  03/06/17  07:20    


 


Est GFR ( Amer)  > 60.0 ml/min (>90)   03/06/17  07:20    


 


Est GFR (Non-Af Amer)  > 60.0 ml/min  03/06/17  07:20    


 


BUN/Creatinine Ratio  41.7   03/06/17  07:20    


 


Glucose  91 mg/dL ()   03/06/17  07:20    


 


Whole Bld Lactic Acid  1.51 mmol/L (0.60-1.99)   03/06/17  07:20    


 


Calcium  9.4 mg/dL (8.6-10.3)   03/06/17  07:20    


 


Magnesium  2.4 mg/dL (1.9-2.7)   03/06/17  07:20    


 


Total Bilirubin  0.4 mg/dL (0.3-1.0)   03/06/17  07:20    


 


AST  21 U/L (13-39)   03/06/17  07:20    


 


ALT  10 U/L (7-52)   03/06/17  07:20    


 


Alkaline Phosphatase  61 U/L ()   03/06/17  07:20    


 


Total Protein  7.7 gm/dL (6.0-8.3)   03/06/17  07:20    


 


Albumin  4.3 gm/dL (4.2-5.5)   03/06/17  07:20    


 


Globulin  3.4 gm/dL  03/06/17  07:20    


 


Albumin/Globulin Ratio  1.3  (1.0-1.8)   03/06/17  07:20    














- Physical Exam


Vitals and I&O: 


 Vital Signs











Temp  97.9 F   03/06/17 19:50


 


Pulse  93   03/06/17 19:50


 


Resp  20   03/06/17 19:50


 


BP  117/81   03/06/17 19:50


 


Pulse Ox  97   03/06/17 16:00








 Intake & Output











 03/06/17 03/06/17 03/07/17





 06:59 18:59 06:59


 


Intake Total 0 1000 


 


Balance 0 1000 


 


Intake:   


 


  Intake, IV Amount  1000 


 


    D5-0.45NS 1,000 ml @ 75  1000 





    mls/hr IV .[id number] [facility] Rx   





    #:[id number]   


 


  Oral 0  


 


Other:   


 


  # Voids 0 3 











Active Medications: 


Current Medications





Dextrose/Sodium Chloride (D5-0.45ns)  1,000 mls @ 75 mls/hr IV .[id number] [facility]


   Stop: 05/05/17 00:36


   Last Admin: 03/06/17 18:40 Dose:  75 mls/hr


Morphine Sulfate (Morphine)  1 mg IVP Q4H PRN


   PRN Reason: Pain (Severe)


   Stop: 05/05/17 00:44


Ondansetron HCl (Zofran)  4 mg IV Q4H PRN


   PRN Reason: Nausea


   Stop: 05/05/17 00:44











- Procedures


Procedures: 


 Procedures











Procedure Code Date


 


DIAGNOSTIC COLONOSCOPY 12081 01/12/17


 


EGD BIOPSY SINGLE/MULTIPLE 76601 07/10/14


 


ESOPHAGOGASTRODUODENOSCOPY [EGD] W/CLOSED BIOPSY 45.16 07/10/14


 


INFLUENZA VACCINATION 99.52 01/30/14


 


INSERT INDWELLING CATH 57.94 12/03/12


 


INSERT INTESTINAL TUBE 96.08 12/03/12


 


INSERT PICC CATH [id number] 12/03/12


 


INSERT TEMP BLADDER CATH 87616 12/03/12


 


INSPECTION OF LOWER INTESTINAL TRACT, ENDO [id number] 01/12/17


 


OPEN AND OTHER CECECTOMY 45.72 12/03/12


 


OPEN AND OTHER RIGHT HEMICOLECTOMY 45.73 12/03/12


 


PARTIAL REMOVAL OF COLON 72833 12/03/12


 


UNLISTED PX SMALL INTESTINE 38290 12/03/12


 


VENOUS CATHETERIZATION NEC 38.93 12/03/12














Assessment/Plan





- Problem List


Patient Problems: 


All Active Problems





coffee ground vomitus (Acute)

## 2017-03-06 NOTE — DIAGNOSTIC IMAGING REPORT
CT scan abdomen and pelvis without intravenous contrast



HISTORY: Pain, abdominal distention



Total DLP equals 389



CTDI equals 7.4



Exam is compared with prior study January 16, 2017.



The liver exhibits a homogeneous parenchyma. No focal lesions. The

spleen appears normal. No focal abnormality seen in the region of the

pancreas. No focal renal lesions.



The exam demonstrates stool and air-filled dilated large bowel. There is

poor delineation of the small bowel margins due to limited

intra-abdominal fat and absence/bowel contrast. Gastrostomy tube is

noted.



No abnormal masses or fluid collections seen within the pelvis.



IMPRESSION:

1. Little change from the prior exam of January 16, 2017.

2. Dilated stool and air-filled large bowel

3. No other definite acute abnormalities.

## 2017-03-07 LAB
ANION GAP SERPL CALC-SCNC: 11.5 MMOL/L (ref 7–16)
BASOPHILS NFR BLD AUTO: 0.4 % (ref 0–2)
BUN SERPL-MCNC: 17 MG/DL (ref 7–25)
BUN/CREAT SERPL: 28.3
CALCIUM SERPL-MCNC: 8.9 MG/DL (ref 8.6–10.3)
CHLORIDE SERPL-SCNC: 109 MEQ/L (ref 98–107)
CO2 SERPL-SCNC: 24.1 MEQ/L (ref 21–31)
CREAT SERPL-MCNC: 0.6 MG/DL (ref 0.7–1.3)
EOSINOPHIL NFR BLD AUTO: 4 % (ref 0–5)
ERYTHROCYTE [DISTWIDTH] IN BLOOD BY AUTOMATED COUNT: 12.3 % (ref 11.5–20)
GLUCOSE SERPL-MCNC: 103 MG/DL (ref 70–105)
HCT VFR BLD CALC: 38.6 % (ref 39–49)
HGB BLD-MCNC: 13 GM/DL (ref 13.2–17.3)
INR PPP: 1.17 (ref 0.5–1.4)
LYMPHOCYTES NFR BLD AUTO: 34.6 % (ref 20–50)
MAGNESIUM SERPL-MCNC: 2.1 MG/DL (ref 1.9–2.7)
MCH RBC QN AUTO: 30.3 PG (ref 26–30)
MCHC RBC AUTO-ENTMCNC: 33.7 PG (ref 28–36)
MCV RBC AUTO: 90.1 FL (ref 80–99)
MONOCYTES NFR BLD AUTO: 7 % (ref 2–10)
NEUTROPHILS # BLD: 3.1 TH/CMM (ref 1.8–8)
NEUTROPHILS NFR BLD AUTO: 54 % (ref 40–80)
PLATELET # BLD: 174 TH/CMM (ref 150–400)
PMV BLD AUTO: 7.3 FL
POTASSIUM SERPL-SCNC: 3.6 MEQ/L (ref 3.5–5.1)
PROTHROMBIN TIME: 12.3 SECONDS (ref 9.5–11.5)
RBC # BLD AUTO: 4.29 MIL/CMM (ref 4.3–5.7)
SODIUM SERPL-SCNC: 141 MEQ/L (ref 136–145)
WBC # BLD AUTO: 5.6 TH/CMM (ref 4.8–10.8)

## 2017-03-07 RX ADMIN — DEXTROSE AND SODIUM CHLORIDE SCH MLS/HR: 5; .45 INJECTION, SOLUTION INTRAVENOUS at 08:52

## 2017-03-07 NOTE — GENERAL PROGRESS NOTE
Subjective





- Review of Systems


Service Date: 03/07/17


Subjective: 





labs normal


undergoing SBO series





Objective





- Results


Result Diagrams: 


 03/07/17 06:56





 03/07/17 06:56


Recent Labs: 


 Laboratory Last Values











WBC  5.6 Th/cmm (4.8-10.8)  D 03/07/17  06:56    


 


RBC  4.29 Mil/cmm (4.30-5.70)  L  03/07/17  06:56    


 


Hgb  13.0 gm/dL (13.2-17.3)  L  03/07/17  06:56    


 


Hct  38.6 % (39.0-49.0)  L  03/07/17  06:56    


 


MCV  90.1 fl (80-99)   03/07/17  06:56    


 


MCH  30.3 pg (26.0-30.0)  H  03/07/17  06:56    


 


MCHC Differential  33.7 pg (28.0-36.0)   03/07/17  06:56    


 


RDW  12.3 % (11.5-20.0)   03/07/17  06:56    


 


Plt Count  174 Th/cmm (150-400)   03/07/17  06:56    


 


MPV  7.3 fl  03/07/17  06:56    


 


Neutrophils %  54.0 % (40.0-80.0)   03/07/17  06:56    


 


Lymphocytes %  34.6 % (20.0-50.0)   03/07/17  06:56    


 


Monocytes %  7.0 % (2.0-10.0)   03/07/17  06:56    


 


Eosinophils %  4.0 % (0.0-5.0)   03/07/17  06:56    


 


Basophils %  0.4 % (0.0-2.0)   03/07/17  06:56    


 


PT  12.3 SECONDS (9.5-11.5)  H  03/07/17  06:56    


 


INR  1.17  (0.5-1.4)   03/07/17  06:56    


 


PTT (Actin FS)  29.6 SECONDS (26.0-38.0)   03/07/17  06:56    


 


Sodium  141 mEq/L (136-145)   03/07/17  06:56    


 


Potassium  3.6 mEq/L (3.5-5.1)   03/07/17  06:56    


 


Chloride  109 mEq/L ()  H  03/07/17  06:56    


 


Carbon Dioxide  24.1 mEq/L (21.0-31.0)   03/07/17  06:56    


 


Anion Gap  11.5  (7.0-16.0)   03/07/17  06:56    


 


BUN  17 mg/dL (7-25)   03/07/17  06:56    


 


Creatinine  0.6 mg/dL (0.7-1.3)  L  03/07/17  06:56    


 


Est GFR ( Amer)  > 60.0 ml/min (>90)   03/07/17  06:56    


 


Est GFR (Non-Af Amer)  > 60.0 ml/min  03/07/17  06:56    


 


BUN/Creatinine Ratio  28.3   03/07/17  06:56    


 


Glucose  103 mg/dL ()   03/07/17  06:56    


 


Whole Bld Lactic Acid  1.51 mmol/L (0.60-1.99)   03/06/17  07:20    


 


Calcium  8.9 mg/dL (8.6-10.3)   03/07/17  06:56    


 


Magnesium  2.1 mg/dL (1.9-2.7)   03/07/17  06:56    


 


Total Bilirubin  0.4 mg/dL (0.3-1.0)   03/06/17  07:20    


 


AST  21 U/L (13-39)   03/06/17  07:20    


 


ALT  10 U/L (7-52)   03/06/17  07:20    


 


Alkaline Phosphatase  61 U/L ()   03/06/17  07:20    


 


Total Protein  7.7 gm/dL (6.0-8.3)   03/06/17  07:20    


 


Albumin  4.3 gm/dL (4.2-5.5)   03/06/17  07:20    


 


Globulin  3.4 gm/dL  03/06/17  07:20    


 


Albumin/Globulin Ratio  1.3  (1.0-1.8)   03/06/17  07:20    














- Physical Exam


Vitals and I&O: 


 Vital Signs











Temp  97.6 F   03/07/17 03:35


 


Pulse  70   03/07/17 03:35


 


Resp  18   03/07/17 03:35


 


BP  118/74   03/07/17 03:35


 


Pulse Ox  97   03/06/17 16:00








 Intake & Output











 03/06/17 03/07/17 03/07/17





 18:59 06:59 18:59


 


Intake Total 1000  


 


Balance 1000  


 


Intake:   


 


  Intake, IV Amount 1000  


 


    D5-0.45NS 1,000 ml @ 75 1000  





    mls/hr IV .[id number] [facility]   





    #:[id number]   


 


Other:   


 


  # Voids 3 1 











Active Medications: 


Current Medications





Dextrose/Sodium Chloride (D5-0.45ns)  1,000 mls @ 75 mls/hr IV .[id number] [facility]


   Stop: 05/05/17 00:36


   Last Admin: 03/06/17 18:40 Dose:  75 mls/hr


Morphine Sulfate (Morphine)  1 mg IVP Q4H PRN


   PRN Reason: Pain (Severe)


   Stop: 05/05/17 00:44


Ondansetron HCl (Zofran)  4 mg IV Q4H PRN


   PRN Reason: Nausea


   Stop: 05/05/17 00:44


Pantoprazole Sodium (Protonix)  40 mg IVP DAILY [facility]


   Stop: 05/06/17 08:59











- Procedures


Procedures: 


 Procedures











Procedure Code Date


 


DIAGNOSTIC COLONOSCOPY 50843 01/12/17


 


EGD BIOPSY SINGLE/MULTIPLE 73934 07/10/14


 


ESOPHAGOGASTRODUODENOSCOPY [EGD] W/CLOSED BIOPSY 45.16 07/10/14


 


INFLUENZA VACCINATION 99.52 01/30/14


 


INSERT INDWELLING CATH 57.94 12/03/12


 


INSERT INTESTINAL TUBE 96.08 12/03/12


 


INSERT PICC CATH [id number] 12/03/12


 


INSERT TEMP BLADDER CATH [id number] 12/03/12


 


INSPECTION OF LOWER INTESTINAL TRACT, ENDO [id number] 01/12/17


 


OPEN AND OTHER CECECTOMY 45.72 12/03/12


 


OPEN AND OTHER RIGHT HEMICOLECTOMY 45.73 12/03/12


 


PARTIAL REMOVAL OF COLON 30455 12/03/12


 


UNLISTED PX SMALL INTESTINE 83383 12/03/12


 


VENOUS CATHETERIZATION NEC 38.93 12/03/12














Assessment/Plan





- Problem List


Patient Problems: 


All Active Problems





coffee ground vomitus (Acute)

## 2017-03-07 NOTE — HISTORY & PHYSICAL
CHIEF COMPLAINT:  Constipation.



HISTORY OF PRESENT ILLNESS:  This is a 31-year-old male with history of mental

retardation, cerebral palsy, acid reflux disease, quadriplegia, history of small

bowel obstruction, history of PEG placement, who was brought into the ER

secondary to constipation.  It is not known exactly as  to how long the 
symptoms have been

ongoing.



PERTINENT FINDINGS ON ADMISSION:  Include a CT of the abdomen and pelvis showing

dilated stool and air filled large bowel and no other abnormalities.  When

compared to a film on January 16, there was no major change.  The patient has

been admitted to a medical/surgical floor for supportive care and treatment.



PAST MEDICAL HISTORY:  As noted above, profound intellectual disability,

cerebral palsy, GERD without esophagitis, osteoarthritis, age-related

osteoporosis, history of quadriplegia, history of acid reflux disease.



PAST SURGICAL HISTORY:  Include PEG placement.



FAMILY HISTORY:  Likely noncontributory.



SOCIAL HISTORY:  No tobacco, ETOH or illicit drug usage.  He lives at [facility].



ALLERGIES:  NKDA.



OUTPATIENT MEDICATIONS:  Tylenol 650 q. 4 p.r.n. for pain, Alendronate 70 mg q.

Saturdays, ascorbic acid 500 mg every day, Dulcolax 10 mg per rectum q. 96

hours, calcium carbonate 500 mg every day, docusate sodium 200 mg b.i.d., Nexium

40 mg daily, Fleet enema 135 mL q. 96 hours p.r.n. for severe constipation, milk

of magnesia 30 mL q. 72 hours, meloxicam 7.5 b.i.d., multivitamins and minerals

every day, MiraLax 17 grams every day, vitamin D 400 international units every

day.



REVIEW OF SYSTEMS:  Not able to be done given patient's condition.



PHYSICAL EXAMINATION:

VITAL SIGNS:  Temperature 98.0, pulse 70, respirations 16, BP 99/58, satting 98%

on room air.

GENERAL:  He is a well-nourished, thin, developmentally delayed male, not in

acute distress.

HEAD AND NECK:  Normocephalic, atraumatic.

NECK:  There is no JVD or LAD.

CARDIOVASCULAR:  Regular rate.  S1 and S2 could be heard.

LUNGS:  Decreased, but clear to auscultation bilaterally.

ABDOMEN:  Soft, supple.  There is tenderness to palpation around the epigastric

area.  There are hypoactive bowel sounds noted at this time.  No organomegaly

noted.



LABORATORY DATA ON ADMISSION:  CBC was essentially within normal limits. CMP 
results

were essentially within normal limits.  Chloride 108, BUN 24, creatinine 0.6,

glucose 91.  Lactic acid 1.51.  LFTs were within normal limits.



DIAGNOSTICS:  CT of the abdomen and pelvis, please refer to HPI.



IMPRESSION:

1.  Acute on chronic constipation.

2.  Rule out small bowel obstruction/ileus.

3.  History of small bowel obstruction.

5.  History of acid reflux disease.

6.  History of mental retardation/cerebral palsy.



PLAN:  The patient has been admitted to the medical floor and has been placed on

IV fluids as well as other supportive care including pain control.  The patient

also has been placed on Protonix given his history of acid reflux disease.  The

surgical eval will also be asked for and small bowel follow through will also be

asked for to rule out obstruction.





DD: 03/07/2017 08:21

DT: 03/07/2017 12:23

JOB# [id number]  [id number]

[email or web address]

## 2017-03-07 NOTE — ADMIT CRITERIA FORM
Admit Criteria Forms





- Admit Criteria


Diagnosis: 





                                                                           


                        GASTROENTEROLOGY GRG





Clinical Indications for Admission to Inpatient Care





                                                                               

    (Place 'X' for any and all applicable criteria):


                     


Hospital admission is needed for appropriate care of the patient because of  

ANY ONE of the followin


[ ]I.   Hemoperitoneum(7) 


[ ]II.  Ascites requiring acute treatment indicated by ANY ONE of the following(

8)(9):


        [ ]a)   Hemodynamic instability remaining after emergency or 

observation level care (as appropriate)


        [ ]b)   Peritoneal signs present (eg, abdominal rigidity, rebound 

tenderness, absent bowel sounds)


        [ ]c)   Tachypnea, Hypoxemia, or other respiratory symptoms remain 

after emergency or observation 


                 level care (as appropriate)


        [ ]d)   Suspected infected ascites as indicated by ANY ONE of the 

following:


                [ ]i)   Temperature greater than 100 degrees F (37.8 degrees C)


                [ ]ii)   Abdominal pain or tenderness not relieved by 

paracentesis


                [ ]iii)   Systemic signs of infection (eg, elevated WBC count, 

fever)


                [ ]iv)   Ascitic fluid analysis consistent with infection ( eg, 

elevated WBC count):


                [ ]v)   Vital sign abnormality


[X ]III.  Suspected acute intra-abdominal process indicated by ANY ONE of the 

following(1)(2)(3)(4)(5):


        [ ]a)  Hemodynamic instability 


        [ ]b)  Peritoneal signs present (eg, abdominal rigidity, rebound 

tenderness, absent bowel sounds) 


        [ ]c)  Bowel obstruction suspected (eg, severe vomiting, abdominal 

distension)


        [ ]d)  Suspected mesenteric ischemia or ischemic colitis(6)


        [X ]e)  Other signs or symptoms of acute abdominal disease (eg, severe 

pain, free air):


[ ]IV. Severe liver disease indicated by ANY ONE of the following(8)(9)(10)(11)(

12)(13)(14):


        [ ]a)  Acute hepatitis (eg, transaminase level greater than 1000 IU/L)


        [ ]b)  Acute elevation of prothrombin time to more than 50% above 

normal or INR greater than 1.5


        [ ]c)  Bilirubin greater than 20 mg/dL (342 micromoles/L) (15)


        [ ]d)  New-onset or worsening hepatic encephalopathy 


        [ ]e)  Acute liver necrosis


        [ ]f)   Vomiting or dehydration that is severe of persistent


        [ ]g)  Hemodynamic instability due to liver disease


        [ ]h)  Acute renal failure


        [ ]i)   Hepatic abscess


        [ ]j)   Dehydration that is severe or persistent


        [ ]k)  Hepatic hydrothorax(21)


        [ ]l)   Other indications of severe liver disease (eg, persistent fever

, ingestion of hepatotoxin)


[ ]V. Severe diarrhea indicated by ANY ONE of the following(17)(18)(19)(20)(21)(

22)(23):


        [ ]a)  High fever or other high-risk infection situation


        [ ]b)  Intractable bloody diarrhea (eg, more than 6 bloody stools per 

day)


        [ ]c)  Suspected Clostridium difficile-associated diarrhea(24)


        [ ]d)  Change in mental status that persists after emergency or 

observation level care (as appropriate)


        [ ]e)  Severe dehydration (eg, greater than 9% loss of body weight in 

children)


        [ ]f)   Inability to maintain hydration


        [ ]g)  Peritoneal signs present (eg, abdominal rigidity, rebound 

tenderness, absent bowel sounds)


        [ ]h)  Abdominal ischemia suspected(6)


        [ ]i)   Hemodynamic instability that persists after emergency or 

observation level care (as appropriate)


        [ ]j)   Severe electrolyte abnormalities requiring inpatient care


        [ ]k)  Acute renal failure


[ ]VI. Suspected toxic megacolon(5)(6)


[ ]VII.Severe dysphagia indicated by ANY ONE of the following(25)(26):


        [ ]a)  Suspected esophageal perforation or fistula(27)


        [ ]b)  Suspected cause that requires inpatient care (eg, caustic 

ingestion, severe esophagitis) (28)


        [ ]c)  Severe dehydration (eg, greater than 9% loss of body weight in 

children)


        [ ]d)  Inability to manage secretions or maintain hydration


        [ ]e)  Hemodynamic instability that persists after emergency or 

observation level care (as appropriate)


        [ ]f)   Severe electrolyte abnormalities requiring inpatient care


        [ ]g)  Acute renal failure


[ ]VIII.Vomiting and ANY ONE of the following (29)(30)(31)(32):


        [ ]a)  High fever or other high-risk infection situation


        [ ]b)  Change in mental status that persists after emergency or 

observation level care (as appropriate)


        [ ]c)  Severe dehydration (e.g., greater than 9% loss of body weight in 

children)


        [ ]d)  Peritoneal signs present (e.g., abdominal rigidity, rebound 

tenderness, absent bowel sounds)


        [ ]e)  Hemodynamic instability that persists after emergency or 

observation level care (as appropriate)


        [ ]f)   Severe electrolyte abnormalities requiring inpatient care


        [ ]g)  Acute renal failure


        [ ]h)  Bowel obstruction suspected (e.g., severe vomiting, abdominal 

distension)


        [ ]i)   Vomiting that is severe or persistent after medical treatment


[ ]IX. Significant dehydration indicated by ANY ONE of the following(23)(24)(25)


        [ ]a)  Clinical findings of severe dehydration indicated by ANY ONE of 

the following:


                [ ]i)    Acute loss of weight from baseline (5% of body weight 

in adults, 9% in pediatric patients)


                [ ]ii)   Hemodynamic instability


                [ ]iii)  Acute renal failure


                [ ]iv)  Serum sodium greater than 150 mEq/L (mmol/L)  


       [ ]b)   Dehydration that is persistent indicated by ALL of the following:


                [ ]i)   Oral rehydration therapy not tolerated or insufficient 

to adequately correct dehydration


                [ ]ii)  Appropriate intravenous treatment (eg, fluids) does not 

readily correct dehydration hours of  (ie, after 12 to 24 of treatment)  


[ ]X.  Gastroparesis and ANY ONE of the following(37)(38)(39): 


        [ ]a)  Dehydration that is severe or persistent


        [ ]b)  Severe electrolyte abnormalities requiring inpatient care 


        [ ]c)  Acute renal failure


        [ ]d)  Vomiting that is severe or persistent


[ ]XI. Complications of transplanted liver indicated by ANY ONE of the following

(40)(41):


        [ ]a)   Acute graft rejection requiring inpatient management (eg, 

intravenous immunosuppression)(42)


        [ ]b)   Failure of transplanted liver as indicated by ANY ONE of the 

following:


                 [ ]i)    Acute hepatitis (eg, transaminase level greater than 

1000 International Units per liter (IU/L))


                 [ ]ii)   Acute elevation of prothrombin time to more than 50% 

above baseline or INR greater than 1.5


                 [ ]iii)  Bilirubin greater than 20 mg/dL (342 micromoles/L)


                 [ ]iv)  New-onset or worsening hepatic encephalopathy


                 [ ]v)   Acute elevation of serum ammonia level (eg, greater 

than 210 mcg/dL (150 micromoles/L))


                 [ ]vi)  Acute liver necrosis


        [ ]c)   Infection requiring inpatient management (eg, Hemodynamic 

instability, need for intravenous antimicrobial               


                 treatment)(43)(44)(45)(46)(47)(48)(49)(50)


        [ ]d)   Other complication of transplanted liver (eg, thrombosis, 

autoimmune hepatitis, variceal bleeding) requiring inpatient management(51)(52) 


[ ]XII  Complications of transplanted pancreas indicated by ANY ONE of the 

following(53):


        [ ]a)  Acute graft rejection requiring inpatient management (eg, 

intravenous immunosuppression)(42)(54)


        [ ]b)  Failure of transplanted pancreas as indicated by ANY ONE of the 

following: 


                [ ]i)   Serum amylase greater than 3 times the upper limit of 

normal or baseline


                [ ]ii)   Serum lipase greater than 3 times the upper limit of 

normal or baseline 


                [ ]iii)  Imaging findings consistent with pancreatic 

inflammation or necrosis  


        [ ]c)  Infection requiring inpatient management (eg, Hemodynamic 

instability, need for intravenous antimicrobial               


                treatment)(43)(44)(45)(46)(47)(48)(49)(50)


        [ ]d)  Other complication of transplanted liver (eg, thrombosis, 

autoimmune hepatitis, variceal bleeding) requiring inpatient management(51)(52)

   


[ ]X.  Gastroenterology condition and ALL of the following:


        [ ]a)  Symptom or finding for which emergency and observation care have 

failed or are not considered 


                appropriate (Also use General Criteria: Observation Care as   

appropriate)


        [ ]b)  Presence of ANY ONE of the following:


               [ ]i)    A General Admission Criteria


               [ ]ii)   A Pediatric General Admission Criteria.


        





The original [facility] [employer] content created by [employer] has been revised. 


The portions of the  content which have been revised are identified through the 

use of italic text or in bold,and [facility] 


has neither reviewed  nor approved the modified material. All other unmodified 

content is copyright  [facility] [email or web address].





Please see references footnoted in the original [facility] edition 

2016











Admit Criteria Met?: Yes

## 2017-03-08 NOTE — CONSULTATION
REFERRING PHYSICIAN:  Dr. Matos.



REASON FOR CONSULTATION:  Abdominal pain.



Thank you for referring this patient to me.



A 31-year-old male with severe mental retardation, cerebral palsy and

quadriplegia.  He comes in because of constipation.  The CT scan of the abdomen

showed large amount of stool in the colon, although from a study over a month

ago.  These findings essentially unchanged.



LABORATORY STUDIES:  Include CBC that is within normal limits, chemistry as

well.



PHYSICAL EXAMINATION:

GENERAL:  The patient unable to give any response and is nonverbal.

ABDOMEN:  Flat and soft.  There is scar from previous surgery and a G-tube is in

place.  Otherwise, it appears that this is a chronic condition on this patient.



The question of chronic constipation on a mentally retarded, quadriplegic

patient can probably be resolved by doing a subtotal colectomy and colostomy. 

This is something that may have to be recommended to the family.





DD: 03/08/2017 10:06

DT: 03/08/2017 13:24

[facility]# [id number]  [id number]

## 2017-03-08 NOTE — DIAGNOSTIC IMAGING REPORT
Small bowel follow-through



History: Obstruction



Comparison: CT abdomen and pelvis on 3/5/2017



Technique/procedure:  view demonstrates diffuse distended loops of

primarily large bowel. A percutaneous gastric feeding tube is noted.



Oral contrast was administered to patient's feeding tube. There is

opacification of the stomach with subsequent opacification of small

bowel and large bowel loops after one hour. Follow-up images

demonstrates contrast all the way to the rectum.



IMPRESSION: 



No evidence of small bowel obstruction.



Markedly distended loops of colon. These findings are likely related to

a colonic ileus. Clinical correlation is recommended.

## 2017-03-08 NOTE — CONSULTATION
INPATIENT GASTROINTESTINAL CONSULTATION



REFERRING PHYSICIAN:  Dr. Darien Matos.



REASON FOR CONSULTATION:  Constipation.



HISTORY OF PRESENT ILLNESS:  This is a 31-year-old male with cerebral palsy,

poor historian and quadriplegic with PEG tube, was brought into the hospital

because of constipation.  The patient is otherwise a poor historian.



PAST MEDICAL HISTORY:  Cerebral palsy, mental retardation, GERD, quadriplegia

and dysphagia.



PAST SURGICAL HISTORY:  PEG tube placement.



FAMILY HISTORY:  Noncontributory.



SOCIAL HISTORY:  Resident of skilled facility.



ALLERGIES:  None.



CURRENT MEDICATIONS:  Morphine, Zofran and Protonix.



REVIEW OF SYSTEMS:  Unobtainable.



PHYSICAL EXAMINATION:

VITAL SIGNS:  Temperature 97.6, breathing 18, pulse of 70 and blood pressure of

118/74.

GENERAL:  No apparent distress.

EYES:  Anicteric.

NECK:  Soft and supple.

CHEST:  Clear.  No effort.

CARDIOVASCULAR:  Regular rate and rhythm.

ABDOMEN:  Soft, nontender and nondistended with a G-tube.

SKIN:  Warm and dry.

EXTREMITIES:  Reveal no cyanosis.



LABORATORY DATA:  Show white count 5.6, hemoglobin 13 and platelets of 174.  INR

is 1.17.  LFTs within normal limits.  CT abdomen and pelvis showed a dilated

stool and air filled large bowel.



IMPRESSION:  This is a 31-year-old male with constipation, could be from

underlying condition versus medication versus obstructive pathology, consider

colonoscopy if the patient has never had this before, but we will search records

to see if it was done recently.



PLAN:

1.  Get records of colonoscopy.

2.  If no recent colonoscopy, then we will need colonoscopy.

3.  Continue laxatives.



Thank you for allowing me to participate.  Please call me if you have any

questions.





DD: 03/07/2017 16:10

DT: 03/08/2017 06:09

JOB# [id number]  [id number]

## 2017-03-08 NOTE — GENERAL PROGRESS NOTE
Subjective





- Review of Systems


Service Date: 03/08/17


Events since last encounter: 





history of chronic constipation


this might be resolve on this patient with severe mental retardation and


quadriplegia by doing a subtotal colectomy


Subjective: 





labs normal


undergoing SBO series





Objective





- Results


Result Diagrams: 


 03/07/17 06:56





 03/07/17 06:56


Recent Labs: 


 Laboratory Last Values











WBC  5.6 Th/cmm (4.8-10.8)  D 03/07/17  06:56    


 


RBC  4.29 Mil/cmm (4.30-5.70)  L  03/07/17  06:56    


 


Hgb  13.0 gm/dL (13.2-17.3)  L  03/07/17  06:56    


 


Hct  38.6 % (39.0-49.0)  L  03/07/17  06:56    


 


MCV  90.1 fl (80-99)   03/07/17  06:56    


 


MCH  30.3 pg (26.0-30.0)  H  03/07/17  06:56    


 


MCHC Differential  33.7 pg (28.0-36.0)   03/07/17  06:56    


 


RDW  12.3 % (11.5-20.0)   03/07/17  06:56    


 


Plt Count  174 Th/cmm (150-400)   03/07/17  06:56    


 


MPV  7.3 fl  03/07/17  06:56    


 


Neutrophils %  54.0 % (40.0-80.0)   03/07/17  06:56    


 


Lymphocytes %  34.6 % (20.0-50.0)   03/07/17  06:56    


 


Monocytes %  7.0 % (2.0-10.0)   03/07/17  06:56    


 


Eosinophils %  4.0 % (0.0-5.0)   03/07/17  06:56    


 


Basophils %  0.4 % (0.0-2.0)   03/07/17  06:56    


 


PT  12.3 SECONDS (9.5-11.5)  H  03/07/17  06:56    


 


INR  1.17  (0.5-1.4)   03/07/17  06:56    


 


PTT (Actin FS)  29.6 SECONDS (26.0-38.0)   03/07/17  06:56    


 


Sodium  141 mEq/L (136-145)   03/07/17  06:56    


 


Potassium  3.6 mEq/L (3.5-5.1)   03/07/17  06:56    


 


Chloride  109 mEq/L ()  H  03/07/17  06:56    


 


Carbon Dioxide  24.1 mEq/L (21.0-31.0)   03/07/17  06:56    


 


Anion Gap  11.5  (7.0-16.0)   03/07/17  06:56    


 


BUN  17 mg/dL (7-25)   03/07/17  06:56    


 


Creatinine  0.6 mg/dL (0.7-1.3)  L  03/07/17  06:56    


 


Est GFR ( Amer)  > 60.0 ml/min (>90)   03/07/17  06:56    


 


Est GFR (Non-Af Amer)  > 60.0 ml/min  03/07/17  06:56    


 


BUN/Creatinine Ratio  28.3   03/07/17  06:56    


 


Glucose  103 mg/dL ()   03/07/17  06:56    


 


Whole Bld Lactic Acid  1.51 mmol/L (0.60-1.99)   03/06/17  07:20    


 


Calcium  8.9 mg/dL (8.6-10.3)   03/07/17  06:56    


 


Magnesium  2.1 mg/dL (1.9-2.7)   03/07/17  06:56    


 


Total Bilirubin  0.4 mg/dL (0.3-1.0)   03/06/17  07:20    


 


AST  21 U/L (13-39)   03/06/17  07:20    


 


ALT  10 U/L (7-52)   03/06/17  07:20    


 


Alkaline Phosphatase  61 U/L ()   03/06/17  07:20    


 


Total Protein  7.7 gm/dL (6.0-8.3)   03/06/17  07:20    


 


Albumin  4.3 gm/dL (4.2-5.5)   03/06/17  07:20    


 


Globulin  3.4 gm/dL  03/06/17  07:20    


 


Albumin/Globulin Ratio  1.3  (1.0-1.8)   03/06/17  07:20    














- Physical Exam


Vitals and I&O: 


 Vital Signs











Temp  98.3 F   03/07/17 23:00


 


Pulse  65   03/07/17 23:00


 


Resp  18   03/07/17 23:00


 


BP  105/68   03/07/17 23:00


 


Pulse Ox  98   03/07/17 23:00








 Intake & Output











 03/07/17 03/08/17 03/08/17





 18:59 06:59 18:59


 


Intake Total 1000  


 


Output Total  200 


 


Balance 1000 -200 


 


Intake:   


 


  Intake, IV Amount 1000  


 


    D5-0.45NS 1,000 ml @ 75 1000  





    mls/hr IV .[id number] [facility] Rx   





    #:[id number]   


 


Output:   


 


  Urine  200 


 


Other:   


 


  # Voids  3 











Active Medications: 


Current Medications





Dextrose/Sodium Chloride (D5-0.45ns)  1,000 mls @ 75 mls/hr IV .[id number] [facility]


   Stop: 05/05/17 00:36


   Last Admin: 03/07/17 08:52 Dose:  75 mls/hr


Morphine Sulfate (Morphine)  1 mg IVP Q4H PRN


   PRN Reason: Pain (Severe)


   Stop: 05/05/17 00:44


Ondansetron HCl (Zofran)  4 mg IV Q4H PRN


   PRN Reason: Nausea


   Stop: 05/05/17 00:44


Pantoprazole Sodium (Protonix)  40 mg IVP DAILY [facility]


   Stop: 05/06/17 08:59


   Last Admin: 03/08/17 09:38 Dose:  Not Given


Polyethylene Glycol (Miralax)  17 gm GT DAILY [facility]


   Stop: 05/07/17 08:59


   Last Admin: 03/08/17 09:38 Dose:  Not Given











- Procedures


Procedures: 


 Procedures











Procedure Code Date


 


DIAGNOSTIC COLONOSCOPY 15522 01/12/17


 


EGD BIOPSY SINGLE/MULTIPLE 62797 07/10/14


 


ESOPHAGOGASTRODUODENOSCOPY [EGD] W/CLOSED BIOPSY 45.16 07/10/14


 


INFLUENZA VACCINATION 99.52 01/30/14


 


INSERT INDWELLING CATH 57.94 12/03/12


 


INSERT INTESTINAL TUBE 96.08 12/03/12


 


INSERT PICC CATH [id number] 12/03/12


 


INSERT TEMP BLADDER CATH [id number] 12/03/12


 


INSPECTION OF LOWER INTESTINAL TRACT, ENDO [id number] 01/12/17


 


OPEN AND OTHER CECECTOMY 45.72 12/03/12


 


OPEN AND OTHER RIGHT HEMICOLECTOMY 45.73 12/03/12


 


PARTIAL REMOVAL OF COLON 19461 12/03/12


 


UNLISTED PX SMALL INTESTINE 28459 12/03/12


 


VENOUS CATHETERIZATION NEC 38.93 12/03/12














Assessment/Plan





- Problem List


Patient Problems: 


All Active Problems





coffee ground vomitus (Acute)

## 2017-03-11 NOTE — DISCHARGE SUMMARY
ADMITTING DIAGNOSES:

1.  Abdominal pain.

2.  Acute on chronic constipation.

3.  Rule out small bowel obstruction/ileus.



SECONDARY DIAGNOSES:

1.  History of small-bowel obstruction.

2.  History of acid reflux disease.

3.  History of mental retardation.

4.  Cerebral palsy.



DISCHARGE DIAGNOSES:

1.  Acute abdominal pain, resolved.

2.  Acute on chronic constipation, improved.



CONSULTANTS:  Dr. Bone of GI and Dr. Carreon of Surgery.



MAJOR PROCEDURES:

1.  Abdominal and pelvic CT showing little change from prior exam on 01/16/2017.

2.  Dilated stool and air filled large bowel.  Small bowel series on 03/07/2017

showing no evidence of small-bowel obstruction.  There are markedly dilated

loops of colon.  These findings are likely related to colonic ileus.



DISCHARGE MEDICATIONS:  Tylenol as needed, Fosamax 70 mg every Saturdays,

ascorbic acid 500 mg every day, Dulcolax 10 mg per rectum every day as needed

for constipation, calcium carbonate 500 mg every day, docusate sodium 200 mg

twice a day, Nexium 40 mg every day, Fleet enema every 96 hours as needed for

severe constipation, milk of magnesia 30 mL every 72 hours, meloxicam 7.5 twice

a day, multivitamins, multi minerals, MiraLax 17 g every day and vitamin D 400

International Units every day.



BRIEF HOSPITAL COURSE:  A 31-year-old male with history of mental retardation,

cerebral palsy, acid reflux disease, quadriplegia, previous history of

small-bowel obstruction and constipation, who presented to the ER with abdominal

pain and acute constipation.  He underwent a CT of the abdomen and pelvis

showing the above-mentioned results.  He was admitted to the Medical Floor and

was started on a regimen of laxatives including an enema, which improved his

symptoms.  Given his history of previous small-bowel obstruction, GI and

surgical consults were asked for further management and care.  However, the

patient's symptoms improved with the above-mentioned regimen.  He had multiple

bowel movements and he was able to tolerate his feeds well with no nausea,

vomiting and no abdominal distention.  His vital signs and his labs remained

stable throughout his hospital stay and given that he had multiple bowel

movements and appeared not to be in distress, the patient was discharged back to

[facility].



CONDITION ON DISCHARGE:  Stable.



DISPOSITION:  As mentioned above, the patient was transferred to [facility].





DD: 03/11/2017 10:53

DT: 03/11/2017 17:56

[facility]# [id number]  [id number]

## 2017-08-21 ENCOUNTER — HOSPITAL ENCOUNTER (INPATIENT)
Dept: HOSPITAL 36 - ER | Age: 32
LOS: 3 days | Discharge: HOME | DRG: 252 | End: 2017-08-24
Attending: INTERNAL MEDICINE | Admitting: INTERNAL MEDICINE
Payer: MEDICAID

## 2017-08-21 DIAGNOSIS — K92.2: ICD-10-CM

## 2017-08-21 DIAGNOSIS — R13.10: ICD-10-CM
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
## 2017-08-21 DIAGNOSIS — G82.50: ICD-10-CM

## 2017-08-21 DIAGNOSIS — M19.90: ICD-10-CM

## 2017-08-21 DIAGNOSIS — F73: ICD-10-CM

## 2017-08-21 DIAGNOSIS — M81.0: ICD-10-CM

## 2017-08-21 DIAGNOSIS — K21.9: ICD-10-CM

## 2017-08-21 DIAGNOSIS — F03.90: ICD-10-CM

## 2017-08-21 DIAGNOSIS — K94.23: Primary | ICD-10-CM

## 2017-08-21 DIAGNOSIS — K56.41: ICD-10-CM

## 2017-08-21 DIAGNOSIS — K56.7: ICD-10-CM

## 2017-08-21 LAB
ALBUMIN/GLOB SERPL: 1.3 {RATIO} (ref 1–1.8)
ALP SERPL-CCNC: 67 U/L (ref 34–104)
ALT SERPL-CCNC: 13 U/L (ref 7–52)
ANION GAP SERPL CALC-SCNC: 11.2 MMOL/L (ref 7–16)
AST SERPL-CCNC: 18 U/L (ref 13–39)
BASOPHILS NFR BLD AUTO: 0.1 % (ref 0–2)
BILIRUB SERPL-MCNC: 0.4 MG/DL (ref 0.3–1)
BUN SERPL-MCNC: 16 MG/DL (ref 7–25)
BUN/CREAT SERPL: 26.7
CALCIUM SERPL-MCNC: 9.7 MG/DL (ref 8.6–10.3)
CHLORIDE SERPL-SCNC: 100 MEQ/L (ref 98–107)
CO2 SERPL-SCNC: 27.7 MEQ/L (ref 21–31)
CREAT SERPL-MCNC: 0.6 MG/DL (ref 0.7–1.3)
EOSINOPHIL NFR BLD AUTO: 0.7 % (ref 0–5)
ERYTHROCYTE [DISTWIDTH] IN BLOOD BY AUTOMATED COUNT: 13.6 % (ref 11.5–20)
GLOBULIN SER-MCNC: 3.6 GM/DL
GLUCOSE SERPL-MCNC: 107 MG/DL (ref 70–105)
HCT VFR BLD CALC: 48.4 % (ref 39–49)
HGB BLD-MCNC: 16.1 GM/DL (ref 13.2–17.3)
INR PPP: 0.97 (ref 0.5–1.4)
LYMPHOCYTES NFR BLD AUTO: 12.6 % (ref 20–50)
MCH RBC QN AUTO: 29.4 PG (ref 26–30)
MCHC RBC AUTO-ENTMCNC: 33.1 PG (ref 28–36)
MCV RBC AUTO: 88.6 FL (ref 80–99)
MONOCYTES NFR BLD AUTO: 7.2 % (ref 2–10)
NEUTROPHILS # BLD: 6.6 TH/CMM (ref 1.8–8)
NEUTROPHILS NFR BLD AUTO: 79.4 % (ref 40–80)
PLATELET # BLD: 182 TH/CMM (ref 150–400)
PMV BLD AUTO: 7.7 FL
POTASSIUM SERPL-SCNC: 4.9 MEQ/L (ref 3.5–5.1)
PROTHROMBIN TIME: 10.1 SECONDS (ref 9.5–11.5)
RBC # BLD AUTO: 5.47 MIL/CMM (ref 4.3–5.7)
SODIUM SERPL-SCNC: 134 MEQ/L (ref 136–145)
WBC # BLD AUTO: 8.4 TH/CMM (ref 4.8–10.8)

## 2017-08-21 PROCEDURE — Z7610: HCPCS

## 2017-08-21 PROCEDURE — 0D20XUZ CHANGE FEEDING DEVICE IN UPPER INTESTINAL TRACT, EXTERNAL APPROACH: ICD-10-PCS

## 2017-08-21 PROCEDURE — C9113 INJ PANTOPRAZOLE SODIUM, VIA: HCPCS

## 2017-08-21 PROCEDURE — X3401: HCPCS

## 2017-08-21 PROCEDURE — X7704: HCPCS

## 2017-08-21 RX ADMIN — DEXTROSE AND SODIUM CHLORIDE SCH MLS/HR: 5; .45 INJECTION, SOLUTION INTRAVENOUS at 07:50

## 2017-08-21 NOTE — ED PHYSICIAN CHART
Chief Complaint/HPI





- Patient Information


Date Seen:: 08/21/17


Time Seen:: 05:10


Chief Complaint:: Coffee ground emesis noticed this morning.


History of Present Illness:: 





Pt was seen by me immediately upon his arrival. Pt was brought in by ambulance 

from nursing facility because he was reportedly noticed to have coffee ground 

emesis earlier this morning. Pt has profound mental disability related to 

cerebral palsy and is essentially nonverbal. H & P are limited because of pt's 

dementia and his inability to cooperate. Pt appears to be comfortable and is in 

no distress.


Allergies:: 


 Allergies











Allergy/AdvReac Type Severity Reaction Status Date / Time


 


No Known Allergies Allergy   Verified 01/12/17 23:32











Vitals:: 





see Nurse Note.


Historian:: Medical Records (from transferring facility.)


Family MD/PCP:: 





Dr. Mc


LMP:: 





N/A


Review:: Nurse's Note Reviewed, Transfer documents Reviewed





Review of Systems





- Review of Systems


General/Constitutional: Other (Pt does not cooperate for ROS.)





Past Medical History





- Past Medical History


Past Medical History: PUD/GERD, Dementia (with cerebral palsy), Other (

osteoporosis)


Family History: Other (Pt is unable to cooperate to provide info on FHx.)


Social History: Care Facility, Other (Pt does not cooperate to provide info on 

SHx.)


Surgical History: PEG/GTube


Psychiatricy History: Dementia


Medication: Reviewed





Family Medical History





- Family Member


  ** Mother


History Unknown: Yes


Ethnicity: Non-


Living Status: Still Living





Physical Exam





- Physical Examination


General/Constitutional: Awake, Well-developed, well-nourished, Alert, No 

distress, Non-toxic appearing


Other Gen/Cons comments:: 





Breathes comfortably and responds to tactile stimuli. Pt is nonverbal.


Head: Atraumatic


Eyes: Lids, conjuctiva normal, PERRL, EOMI


Skin: Nl inspection, No rash, No skin lesions, No ecchymosis, Well hydrated, No 

lymphadenopathy


ENMT: External ears, nose nl, Nasal exam nl, Oropharynx nl


Neck: Nontender, Full ROM w/o pain, No JVD, No nuchal rigidity, No mass, No 

stridor


Respiratory: Nl effort/Exclusion, Clear to Auscultation, No Wheeze/Rhonchi/Rales


Cardio Vascular: RRR, No murmur, gallop, rubs


GI: No tenderness/rebounding/guarding, No organomegaly, No hernia, Normal BS's, 

Nondistended, No mass/bruits


Other GI comments:: 





Gastrostomy tube noticed in epigastric region.


Extremities: No edema


Other Extremities comments:: 





Contractures noticed in all 4 extremities.


Other Neuro/Psych comments:: 





Alert and responsive to tactile stimuli. Spontaneous movements noticed in all 4 

extremities. Pt does not cooperate for full neurological exam.





Labs/Radiology/EKG Results





- Lab Results


Results: 





 Laboratory Tests











  08/21/17





  05:40


 


WBC  8.4  D


 


RBC  5.47


 


Hgb  16.1  D


 


Hct  48.4  D


 


MCV  88.6


 


MCH  29.4


 


MCHC Differential  33.1


 


RDW  13.6


 


Plt Count  182


 


MPV  7.7


 


Neutrophils %  79.4


 


Lymphocytes %  12.6 L


 


Monocytes %  7.2


 


Eosinophils %  0.7


 


Basophils %  0.1








PT/PTT, CMP are pending.





ED Septic Shock





- .


Is Septic Shock (SBP<90, OR Lactate>4 mmol\L) present?: No





Reassessment (Disposition)





- Reassessment


Reassessment:: 





0605 Pt has been repeatedly evaluated. Pt remains stable without signs of 

active bleeding. Case was discussed with Dr. Matos with pertinent H & P, and 

available lab results discussed. Dr. Matos will follow on pending lab results. 

Pt is to be admitted to Med/Surg Unit under his care.


Reassessment Condition:: Improved





- Diagnosis


Diagnosis:: 





Coffee ground emesis by hx. Stable.


Cerebral palsy with profound mental disability.








- Patient Disposition


Admitted to:: Med/Surg


Admitting Medical Physician:: Darien Matos


Time:: 06:15


Condition at Disposition:: Stable

## 2017-08-21 NOTE — HISTORY & PHYSICAL
ADMIT DATE:     08/21/2017



CHIEF COMPLAINT:  Coffee-ground emesis.



HISTORY OF PRESENT ILLNESS:  A 31-year-old mentally disabled gentleman with

diagnosis of mental retardation, cerebral palsy, history of acid reflux disease,

osteoarthritis, constipation, osteoporosis, who resides at [facility].  He was noted to have coffee-ground emesis and was transferred to the ER

where his labs were essentially within normal limits.  The G-tube was eval by ER

physician and it was noted not to be function properly and was not able to be 
flushed. 

The patient has been admitted to the medical/surgical floor for further

management and care.  The patient appears to be comfortable, but is nonverbal.



PAST MEDICAL HISTORY:  Mental disability related cerebral palsy.



FAMILY HISTORY:  Noncontributory.



PAST SURGICAL HISTORY:  Include a PEG placement.



SOCIAL HISTORY:  He lives at Care [facility] under the care of Dr. Mc.



ALLERGIES:  NKDA.



OUTPATIENT MEDICATIONS:  Tylenol p.r.n. q.4, Fosamax 70 mg every Saturday,

vitamin C 500 mg every day, Dulcolax 10 mg q. 96 hours for constipation, calcium

carbonate 500 mg every day, vitamin D3 400 units every day, docusate sodium 200

mg q.12, Nexium 40 mg every day, Fleet enema q. 96 hours for severe

constipation, milk of magnesia 30 mL q. 72 hours for GI upset, meloxicam 7.5

b.i.d., multivitamins every day, MiraLax 17 grams b.i.d.



REVIEW OF SYSTEMS:  Not able to be done given patient's condition.



PHYSICAL EXAMINATION:

JUAN JOSE L SIGNS:  Temperature 97.8, pulse 77, respirations 20, /77, satting

98% on room air.

GENERAL:  Well-developed, thin male, not in acute distress.

NECK:  There is no JVD or LAD.

CARDIOVASCULAR:  Regular rate and rhythm without any murmurs.

LUNGS:  Clear to auscultation.

ABDOMEN:  Soft, supple.  There is mild distention, but no organomegaly.  The

G-tube is in place.  There is no surrounding erythema noted.  There is

normoactive bowel sounds.

LOWER EXTREMITIES:  There is no pedal edema.



LABORATORY DATA:  CBC within normal limits.  INR within normal limits.  Sodium

134, other than that, his chemistry was within normal limits.  Glucose was 107. 

LFTs were within normal limits.



ASSESSMENT:

1.  Coffee-ground emesis, rule out upper gastrointestinal bleed.

2.  Malfunctioning gastrostomy tube.

3.  History of dysphagia, secondary to severe mental retardation, status post

PEG.

4.  Mental retardation/cerebral palsy.

5.  History of osteoarthritis.

6.  History of osteoporosis.

7.  History of constipation.



PLAN:  The patient has been admitted to the medical/surgical floor for further

management and care.  He has been placed on Protonix IV and IV fluids with D5

half NS at 75 mL per hour.  His home meds will be withheld for the time being

and a GI eval has been asked for further management and care.





DD: 08/21/2017 09:27

DT: 08/21/2017 10:22

[facility]# [id number]  [id number]

ROSANNA

## 2017-08-22 LAB
ANION GAP SERPL CALC-SCNC: 7.1 MMOL/L (ref 7–16)
BUN SERPL-MCNC: 9 MG/DL (ref 7–25)
BUN/CREAT SERPL: 18
CALCIUM SERPL-MCNC: 8.4 MG/DL (ref 8.6–10.3)
CHLORIDE SERPL-SCNC: 107 MEQ/L (ref 98–107)
CO2 SERPL-SCNC: 26.8 MEQ/L (ref 21–31)
CREAT SERPL-MCNC: 0.5 MG/DL (ref 0.7–1.3)
EOSINOPHIL NFR BLD: 2 % (ref 0–5)
ERYTHROCYTE [DISTWIDTH] IN BLOOD BY AUTOMATED COUNT: 13.6 % (ref 11.5–20)
GLUCOSE SERPL-MCNC: 95 MG/DL (ref 70–105)
HCT VFR BLD CALC: 36.3 % (ref 39–49)
HGB BLD-MCNC: 12.2 GM/DL (ref 13.2–17.3)
MAGNESIUM SERPL-MCNC: 2.3 MG/DL (ref 1.9–2.7)
MCH RBC QN AUTO: 30.1 PG (ref 26–30)
MCHC RBC AUTO-ENTMCNC: 33.7 PG (ref 28–36)
MCV RBC AUTO: 89.5 FL (ref 80–99)
NEUTROPHILS NFR BLD AUTO: 38 % (ref 40–80)
PLAT MORPH BLD: NORMAL
PLATELET # BLD EST: ADEQUATE 10*3/UL
PLATELET # BLD: 146 TH/CMM (ref 150–400)
PMV BLD AUTO: 7.3 FL
POTASSIUM SERPL-SCNC: 3.9 MEQ/L (ref 3.5–5.1)
RBC # BLD AUTO: 4.05 MIL/CMM (ref 4.3–5.7)
RBC MORPH BLD: NORMAL
SODIUM SERPL-SCNC: 137 MEQ/L (ref 136–145)
TOTAL CELLS COUNTED BLD: 100
WBC # BLD AUTO: 4 TH/CMM (ref 4.8–10.8)

## 2017-08-22 RX ADMIN — POLYETHYLENE GLYCOL 3350 SCH GM: 17 POWDER, FOR SOLUTION ORAL at 10:02

## 2017-08-22 RX ADMIN — Medication SCH TAB: at 10:02

## 2017-08-22 RX ADMIN — DEXTROSE AND SODIUM CHLORIDE SCH MLS/HR: 5; .45 INJECTION, SOLUTION INTRAVENOUS at 04:07

## 2017-08-22 RX ADMIN — DEXTROSE AND SODIUM CHLORIDE SCH MLS/HR: 5; .45 INJECTION, SOLUTION INTRAVENOUS at 20:52

## 2017-08-22 RX ADMIN — POLYETHYLENE GLYCOL 3350 SCH GM: 17 POWDER, FOR SOLUTION ORAL at 17:01

## 2017-08-22 RX ADMIN — DOCUSATE SODIUM SCH MG: 50 LIQUID ORAL at 10:02

## 2017-08-22 RX ADMIN — DOCUSATE SODIUM SCH MG: 50 LIQUID ORAL at 20:55

## 2017-08-22 NOTE — ADMIT CRITERIA FORM
Admit Criteria Forms





- Admit Criteria


Diagnosis: 





                  GENERAL ADMISSION CRITERIA


 


                                                                                

  (Place 'X' for any and all applicable criteria):





Admission is indicated for ANY ONE of the following:





[ ]I.     Hemodynamic instability as indicated by ANY ONE of the following(1)(2)

(3)(4)(5):


          [ ]a) Vital sign abnormality not readily corrected by appropriate 

treatment within 12 to 24 hours indicated by ANY ONE of the following: 


                 [ ]i)    Hypotension


                 [ ]ii)   Symptomatic Tachycardia unresponsive to treatment (eg

, analgesia, fluids, sedation as indicated)


                 [ ]iii)  Orthostatic vital sign changes unresponsive to 

treatment (eg, fluids)


          [ ]b) Vital sign abnormality that is severe indicated by ANY ONE of 

the following:


                 [ ]i)    Inadequate perfusion indicated by ANY ONE of the 

following:


                          [ ]1)   Lactic acidosis (greater than 2 mmol/L)


                          [ ]2)   New abnormal capillary refill (greater than 3 

seconds)


                          [ ]3)   Other metabolic acidosis (arterial pH less 

than 7.35) not otherwise explained


                          [ ]4)   Reduced urine output


                          [ ]5)   Altered mental status


                          [ ]6)   Myocardial Ischemia


                 [ ]v)    Mean arterial pressure[A] less than 60 mm Hg


                 [ ]vi)   Mean arterial pressure[A] less than 70 mm Hg after 30 

minutes of appropriate treatment (eg, fluid resuscitation)


                 [ ]vii)  IV inotropic or vasopressor medication required to 

maintain adequate blood pressure or perfusion


                 [ ]viii) Sustained heart rate greater than 120 beats per 

minute in adult or child 6 years or older[B]]


[ ]II.    Hypertension requiring inpatient treatment as indicated by ANY ONE of 

the following(6)(7)(8):


                 [ ]a)  SBP greater than 220 mm Hg or DBP greater than 120 mm 

Hg despite treatment


                 [ ]b)  SBP greater than 140 mm Hg or DBP greater than 100 mm 

Hg with evidence of acute end organ 


                         damage as indicated by ANY ONE of the following:


                         [ ]i)    Encephalopathy


                         [ ]ii)   Acute renal failure as indicated by new onset 

of ANY ONE of the following(9)(10)(11)(12)(13):


                                  [ ]1)  A 3-fold rise in serum creatinine from 

baseline


                                  [ ]2) Serum creatinine greater than 4 mg/dL (

354 micromoles/L) with acute rise greater than 0.5 mg/dL (44.2 micromoles/L)


                                  [ ]3)  Reduction of more than 75% in 

estimated glomerular filtration rate from baseline


                                  [ ]4) Estimated glomerular filtration rate 

less than 35 mL/min/1.73m2 (0.59 mL/sec/1.73m2) in child up to 18 years of age


                                  [ ]5) Cessation of urine output indicated by 

ALL of the following: 


                                        [ ]A.   Adequate volume status


                                        [ ]B.   Inadequate urine output as 

indicated by ANY ONE of the following:


                                                [ ]a.     Urine output less 

than 0.3 mL/kg/hr for 24 hours


                                                [ ]b.     Anuria (urine output 

less than 0.1 mL/kg/hr) for 12 hours 


                        [ ]iii)  Aortic dissection


                        [ ]iv)  Myocardial ischemia


                        [ ]v)   Left ventricular heart failure 


                        [ ]vi)  Retinal hemorrhage


                        [ ]vii) Other significant finding 


                 [ ]c)  Hypertension in child requiring inpatient treatment as 

indicated by ALL of the following(14)(15)(16):


                        [ ]i)    Outpatient treatment not effective, not 

available, or not appropriate 


                        [ ]ii)   SBP or DBP greater than 95th percentile for age


                        [ ]iii)  Evidence of acute end organ damage as 

indicated by ANY ONE of the following:


                                 [ ]1)   Altered mental status


                                 [ ]2)   Acute renal failure as indicated by 

new onset of ANY ONE of the following(9)(10)(11)(12)(13):


                                        [ ]A.    A 3-fold rise in serum 

creatinine from baseline


                                        [ ]B.    Serum creatinine greater than 

4 mg/dL (354 micromoles/L) with acute rise greater than 0.5 mg/dL (44.2 

micromoles/L)


                                        [ ]C.    Reduction of more than 75% in 

estimated glomerular filtration rate from baseline


                                        [ ]D.    Estimated glomerular 

filtration rate less than 35 mL/min/1.73m2 (0.59 mL/sec/1.73m2)in child up to 

18 years of age


                                        [ ]E.    Cessation of urine output 

indicated by ALL of the following:


                                                  [ ]a.   Adequate volume status


                                                  [ ]b.   Inadequate urine 

output as indicated by ANY ONE of the following:


                                                           [ ]1)  Urine output 

less than 0.3 mL/kg/hr for 24 hours 


                                                           [ ]2)  Anuria (urine 

output less than 0.1 mL/kg/hr) for 12 hours


                                                           [ ]3)  Severe 

headache


                                                           [ ]4)  Visual 

disturbance


                                                           [ ]5)  Retinal 

hemorrhage


                                                           [ ]6)  Other 

significant finding


[ ]III.   Acute cardiac or peripheral ischemia as indicated by ANY ONE of the 

following:


          [ ]a)  Acute coronary syndrome(17)(18)


          [ ]b)  Acute peripheral ischemia (eg, pulseless, cool, mottled, or 

cyanotic extremity)(19) 


[ ]IV.     Cardiac arrhythmias or findings of immediate concern indicated by 

ANY ONE of the following(20)(21):


           [ ]a)  Heart rhythms that are inherently dangerous or unstable 

indicated by ANY ONE of the following(22)(23)(24):


                  [ ]i)    Resuscitated ventricular fibrillation or cardiac 

arrest 


                  [ ]ii)   Ventricular escape rhythm


                  [ ]iii)  Sustained ventricular tachycardia (30 seconds or 

more of ventricular rhythm at greater than 100 beats per minute)


                  [ ]iv)  Nonsustained ventricular tachycardia and ANY ONE of 

the following:


                          [ ]1)   Suspected cardiac ischemia as cause or 

consequence of ventricular tachycardia


                          [ ]2)   In setting of acute myocarditis


           [ ]b)  Unstable cardiac conduction defects indicated by ANY ONE of 

the following(24)(25)(26):


                  [ ]i)    Type II second-degree atrioventricular block 


                  [ ]ii)   Third-degree atrioventricular block


                  [ ]iii)  New-onset left bundle branch block with suspected 

myocardial ischemia


           [ ]c)  Any heart rhythm and ANY ONE of the following(22)(23)(27)(28)(

29):


                  [ ] i)    Continuous long-term ECG monitoring needed (eg, 

initiation of drug requiring monitoring for more than 24 hours)


                  [ ] ii)   Patient has automatic implanted cardioverter 

defibrillator that is repeatedly firing, malfunctioning, 


                            or in need of immediate adjustment of settings 

beyond the scope of ambulatory or observation care. 


           [ ]d)  Heart rhythms of concern due to ANY ONE of the following:


                  [ ]i)    Hypotension


                  [ ]ii)   Respiratory distress


                  [ ]iii)  Association with other significant symptoms (eg, 

bradycardia with syncope or ongoing dizziness, 


                          supraventricular tachycardia with chest pain) (27)(28)

(30)


[ ] V.          Severe heart failure as indicated by ANY ONE of the following (

31)(32):


            [ ]a)  Respiratory distress 


            [ ]b)  Hypotension


            [ ]c)  Anasarca (refractory to outpatient therapy) 


            [ ]d)  Cardiac arrhythmias of immediate concern 


            [ ]e)  Myocardial ischemia


[ ]VI.     Respiratory abnormalities, including ANY ONE of the following(33)(34)

(35)(36):


           [ ]a)  Respiratory rate greater than 30 breaths per minute 

unresponsive to treatment [A]


           [ ]b)  New saturation of arterial oxygen less than 90%


           [ ]c)  New partial pressure of carbon dioxide greater than 44 mm Hg (

5.9 kPa)


           [ ]d)  Supplemental oxygen or respiratory treatments needed that are 

new or not performable at other levels of care


           [ ]e)  New-onset cyanosis


           [ ]f)   Inability to protect airway


           [ ]g)  Chronic lung disease with severe deterioration (not 

responsive to emergency and observation care treatment as 


                   appropriate) as indicated by ANY ONE of the  following(34)(36

):


                     [ ]i)    SaO2 5% below baseline in patient with chronic 

hypoxemia


                     [ ]ii)   New requirement for supplemental oxygen to keep 

SaO2 at baseline or acceptable level


                     [ ]iii)  Required supplemental oxygen performable only in 

acute inpatient setting


                     [ ]iv)   Severe airflow or ventilation abnormalities


                     [ ]v)    Previously mobile patient unable to walk between 

rooms 


                     [ ]vi    Inability to eat or sleep due to dyspnea


                     [ ]vii)  Rapid rate of exacerbation onset 


                     [ ]viii) Altered mental status


 ]VII.  Severe airflow or ventilation abnormalities (not responsive to 

emergency and observation care treatment as appropriate) as 


         indicated by ANY  ONE of the following(33)(34)(35)(37):


          [ ]a)  PCO2 greater than 42 mm Hg (5.6 kPa) and pH less than 7.35 (new

)


          [ ]b)  Documented PCO2 increased more than 5 mm Hg (0.7 kPa) from 

disease baseline


          [ ]c)  Airflow measurements [B] less than 60% of previous best or 

predicted (eg, peak expiratory flow rate less than 300 L/minute)


                  despite intensive emergent treatment [C]


          [ ]d)  Required respiratory treatments that are performable only in 

acute inpatient setting


[ ]VIII.  Impending or actual respiratory arrest  ( Also use Respiratory 

Failure GRG  for severe respiratory disease and


          long-term mechanical ventilation patients)


[ ]IX.    Neurologic abnormalities, including ANY ONE of the following:


          [ ]a)  New findings that suggest ANY ONE of the following:


                 [ ]i)    CNS infection(38)


                 [ ]ii)   Cerebral bleeding, ischemia, or vasospasm(39)(40)


                 [ ]iii)  Increased intracranial pressure, hydrocephalus, or 

cerebral edema(41)(42)(43)


                 [ ]iv)  Spinal cord injury(44)


         [ ]b)  Uncontrolled seizures(45)


         [ ]c)  New-onset coma (eg, Kylie coma scale score less than 9) or 

unexplained abnormal mental status 


                (eg, Allons coma scale score less than 14) [D](41)(46)(47)


[ ]X.   New-onset severe neurologic findings requiring inpatient care; examples 

include(42)(48)(49):


         [ ]a)  Papilledema


         [ ]b)  Cerebral edema


         [ ]c)  Mass effect on CT scan


[ ]XI.    Suspected acute intra-abdominal process with peritoneal signs, 

abdominal mass, or similar findings (50)(51)(52)


[ ]XII.   Severe physiologic disorder remaining after emergency or observation 

level care (as appropriate) as indicated by 


         ANY ONE of the following (53):


         [ ]a)  Significant dehydration


         [ ]b)  Diabetic ketoacidosis


         [ ]c)  Hyperglycemic hyperosmolar state (eg, osmolality greater than 

320 mOsm/kg (mmol/kg)


         [ ]d)  Hypoglycemia


         [ ]e)  Other (new) acid-base disorder with pH less than 7.35 or 

greater than 7.5(54)


         [ ]f)  Thyroid storm (55)


         [ ]g)  Myxedema coma (55)


[ ]XIII.  Abdominal abnormalities with ANY ONE of the following(56)(57):


         [ ]a)  Absent bowel sounds with complete ileus


         [ ]b)  Signs of intestinal obstruction or peritonitis [E]


         [ ]c)  Nausea and vomiting that cannot be controlled with outpatient 

or observation care


[ ]XIV. Acute renal failure as indicated by new onset of ANY ONE of the 

following(9)(10)(11)(12)(13):


          [ ]a)  A 3-fold rise in serum creatinine from baseline


          [ ]b)  Serum creatinine greater than 4 mg/dL (354 micromoles/L) with 

acute rise greater than 0.5 mg/dL (44.2 micromoles/L)


          [ ]c)  Reduction of more than 75% in estimated glomerular filtration 

rate from baseline


          [ ]d)  Estimated glomerular filtration rate less than 35 mL/min/

1.73m2 (0.59 mL/sec/1.73m2) in child up to 18 years of age


          [ ]e)  Cessation of urine output indicated by ALL of the following:


                 [ ]i)    Adequate volume status


                 [ ]ii)   Inadequate urine output as indicated by ANY ONE of 

the following:


                         [ ]1)   Urine output less than 0.3 mL/kg/hr for 24 

hours


                         [ ]2)   Anuria (urine output less than 0.1 mL/kg/hr) 

for 12 hours


[ ]XV.  Significant uremic complications as indicated by ANY ONE of the 

following(58)(59)(60):


          [ ]a)  Outpatient therapy is ineffective or not feasible for ANY ONE 

of the following:


                 [ ]i)    Severe heart failure 


                 [ ]ii)   Severehypertension 


                 [ ]iii)  Pleural effusion


                 [ ]iv)  Pericarditis or pericardial effusion


           [ ]b)  Cardiac arrhythmias of immediate concern     


           [ ]c)  Intractable nausea or vomiting


           [ ]d)  Recurrent seizures 


           [ ]e)  Encephalopathy


           [ ]f)   Bleeding abnormalities (eg, platelet dysfunction) with 

active (eg, gastrointestinal) bleeding 


           [ ]g)  Dialysis indicated before long-term access or ambulatory 

arrangements can be made


           [ ]h)  Significant metabolic or electrolyte abnormalities (eg, 

severe acidosis or hyperkalemia)


[ ]XVI.  High fever or other high-risk infection situation as indicated by ANY 

ONE of the following(61)(62)(63)(64):


           [ ]a)  Outpatient and observation care antimicrobial treatment 

unavailable, not effective, or not appropriate 


           [ ]b)  Documented bacteremia


           [ ]c)  Temperature greater than 40.5 degrees C (104.9 degrees F) (

oral)


           [ ]d)  Temperature greater than 39.5 degrees C (103.1 degrees F) (

oral) or less than 36 degrees C (96.8 degrees F)


                   (rectal) that does not respond to e  treatment and 

observation care


[ ] XVII.  Temperature less than 95 degrees F (35 degrees C)(rectal)(65)


[ ] XVIII. Severe nutritional abnormalities as indicated by ALL of the following

(66)(67):


           [ ]a)  Inability to tolerate or establish sufficient oral or other 

enteral nutrition in outpatient setting 


           [ ]b)  Parenteral nutrition regimen need that must be implemented on 

inpatient basis


[ ] XIX.  Severe electrolyte abnormalities indicated by ALL of the following(68)

(69)(70):


           [ ]a)  Electrolytes and associated findings are not as expected for 

patient baseline or acceptable treatment effects.


           [ ]b)  Severe abnormalities indicated by ANY ONE of the following:


                  [ ]i)  Sodium less than 130 mEq/L (mmol/L) (new)


                  [ ]ii)Sodium less than 135 mEq/L (mmol/L) with ANY ONE of the 

following:


                        [ ]1)   Uncorrectable (to near normal or chronic 

baseline) after trial of outpatient and emergency treatment


                        [ ]2)   Altered mental status


                        [ ]3)   Seizures


                        [ ]4)   Severe medical etiology requiring inpatient 

management (eg, heart failure, hypovolemia)                 


                  [ ]iii)  Sodium greater than 155 mEq/L (mmol/L)


                  [ ]iv)  Sodium greater than 150 mEq/L (mmol/L) with ANY ONE 

of the following:


                        [ ]1)   Uncorrectable (to near normal or chronic 

baseline) with outpatient and emergency treatment


                        [ ]2)   Altered mental status


                        [ ]3)   Seizures


                        [ ]4)   Severe medical etiology (eg, hypovolemia, 

diabetes insipidus)


                  [ ]v)   Potassium less than 2.5 mEq/L (mmol/L) despite 

outpatient and emergency treatment 


                  [ ]vi)  Potassium less than 3 mEq/L (mmol/L) with ANY ONE of 

the following:


                         [ ]1)   Weakness


                         [ ]2)   Cardiac abnormality (eg, arrhythmia, 

conduction disturbance)


                         [ ]3)   Cardiac ischemia


                         [ ]4)   Ileus


                         [ ]5)   Ongoing medical cause requiring inpatient 

management (eg, acute renal wasting or SIADH)


                         [ ]6)   Other severe symptoms                 


                 [ ]vii) Potassium greater than 6.5 mEq/L (mmol/L)


                 [ ]viii) Potassium greater than 5 mEq/L (mmol/L) with ANY ONE 

of the following:


                        [ ]1)   Uncorrectable (to near normal or chronic 

baseline) with outpatient and emergency treatment


                        [ ]2)   Severe ECG findings [F]


                        [ ]3)   Acute worsening of renal failure (creatinine 

greater than 2.5 mg/dL (221 micromoles/L) or significant elevation for age and 

size)


                        [ ]4)   Severe weakness


                        [ ]5)   Severe medical etiology (eg, hemolysis, 

infection, drug overdose)


                 [ ]ix)  Calcium less than 7 mg/dL (1.75 mmol/L) despite 

outpatient and emergency treatment (72)


                 [ ]x)  Calcium less than 8 mg/dL (2 mmol/L) with significant 

symptoms or findings; examples include(72):                       


                         [ ]1)   Altered mental status


                         [ ]2)   Muscle spasms


                         [ ]3)   Seizures


                         [ ]4)   Breathing difficulty


                         [ ]5)   Cardiac abnormality (eg, arrhythmia or 

conduction disturbance)


                [ ]xi)  Calcium greater than 14 mg/dL (3.5 mmol/L)(72)


                [ ]xii) Calcium greater than 12 mg/dL (3 mmol/L) with ANY ONE 

of the following(72):


                         [ ]1)   Uncorrectable (to near normal or chronic 

baseline) with outpatient and emergency treatment


                         [ ]2)   Significant dehydration or hypovolemia as 

indicated by ALL of the following(70)(73)(74):


                                  [ ]A.  Not resolved with initial treatments


                                  [ ]B.  Clinically significant dehydration as 

indicated by ANY ONE of the following:


                                           [ ]a. Vomiting refractory to 

outpatient treatment (ie, precluding oral rehydration)


                                           [ ]b. Inability to drink


                                           [ ]c. Hypernatremia or other 

electrolyte abnormality unable to be corrected with outpatient and emergency 

treatment


                                           [ ]d. Failure to remain hydrated 

with outpatient therapy 


                                           [ ]e. Reduced urine output


                                           [ ]f. Hypotension


                                           [ ]g. Serious cause for dehydration 

requiring acute hospitalization (eg, bowel obstruction, increased 


                                                  intracranial pressure, 

infectious cause)


                                           [ ]h. Child with ANY ONE of the 

following(75):


                                                  [ ]1)  Severe abdominal 

tenderness


                                                  [ ]2)  Adequate care not 

available at home     


                                                  [ ]3)  Severe dehydration (

greater than 9% loss of body weight)


                                                  [ ]4)  Significant symptoms 

or findings; examples include: 


                                                          [ ]A. Altered mental 

status


                                                          [ ]B. Cardiac 

abnormality (eg, arrhythmia, conduction disturbance) 


                                                          [ ]C. Malignant 

etiology requiring inpatient treatment


                [ ]xiii)  Phosphorus less than 1 mg/dL (0.32 mmol/L)


                [ ]xiv)  Phosphorus less than 1.5 mg/dL (0.48 mmol/L) with ANY 

ONE of the following:


                          [ ]1)   Patient unresponsive to outpatient and 

emergency treatment


                          [ ]2)   Significant symptoms or findings; examples 

include: 


                                  [ ]A.  Weakness


                                  [ ]B. Altered mental status 


                                  [ ]C. Breathing difficulty 


                                  [ ]D. Seizures


                                  [ ]E. Rhabdomyolysis


                [ ]xv)   Phosphorus greater than 10 mg/dL (3.2 mmol/L)


                [ ]xvi)  Phosphorus greater than 4.5 mg/dL (1.45 mmol/L) (new) 

with ANY ONE of the following:


                          [ ]1)   Severe medical etiology (eg, crush injury, 

acute renal failure)


                          [ ]2)   Associated hypocalcemia with significant 

findings; examples include: 


                                  [ ]A.    Neurologic symptoms


                                  [ ]B.    Altered mental status


                                  [ ]C.    Muscle spasms


                                  [ ]D.    Seizures


                                  [ ]E.    Breathing difficulty


                                  [ ]F.    Cardiac abnormality (eg, arrhythmia, 

conduction disturbance)


                [ ]xvii)   Magnesium less than 1 mg/dL (0.41 mmol/L)


                [ ]xviii)  Magnesium less than 1.5 mg/dL (0.62 mmol/L) with ANY 

ONE of the following:


                           [ ]1)   Patient unresponsive to outpatient and 

emergency treatment


                           [ ]2)   Associated hypocalcemia with significant 

findings; examples include: 


                                   [ ]A.    Altered mental status


                                   [ ]B.    Muscle spasms


                                   [ ]C.    Seizures


                                   [ ]D.    Breathing difficulty


                                   [ ]E.    Cardiac abnormality (eg, arrhythmia

, conduction disturbance)


                           [ ]3)   Associated hypokalemia (potassium less than 

3 mEq/L (mmol/L)) with risk of arrhythmia 


                [ ]xix)  Magnesium greater than 4 mEq/L (2 mmol/L) 


                [ ]xx)   Magnesium greater than 2.5 mEq/L (1.25 mmol/L) with 

significant symptoms or findings; examples include:


                          [ ]1)   Weakness


                          [ ]2)   Altered mental status


                          [ ]3)   Cardiac abnormality (eg, arrhythmia, 

conduction disturbance)


                          [ ]4)   Breathing difficulty


                          [ ]5)   Severe medical etiology (eg, renal failure, 

hypovolemia)


               [ ]xxi)   Uric acid greater than 20 mg/dL (1190 micromoles/L)(76)


               [ ]xxii)  Uric acid greater than 8 mg/dL (476 micromoles/L) with 

significant symptoms or findings of tumor


                          lysis syndrome; examples include(76):


                          [ ]1)   Creatinine greater than 1.5 times upper limit 

of normal


                          [ ]2)   Cardiac abnormality (eg, arrhythmia, 

conduction disturbance)


                          [ ]3)   Seizure


[ ]XX.   Acute blood loss causing significant abnormality as indicated by ANY 

ONE of the following(77)(78):


         [ ]a)  Hemoglobin less than 10 g/dL (100 g/L) (not baseline)


         [ ]b)  Hematocrit less than 30% (0.30) (not baseline)


         [ ]c)  Repeat hematocrit decreased more than 2% (0.02)


         [ ]d)  Uncontrolled bleeding


[ ]XXI. Severe anemia indicated by ANY ONE of the following(78)(79):


         [ ]a)  Altered mental status 


         [ ]b)  Chest pain


         [ ]c)  Exertional dyspnea 


         [ ]d)  Syncope


         [ ]e)  Other findings suggesting inadequate perfusion


         [ ]f)   Treatment with transfusion or volume replacement is 

ineffective at resolving ANY ONE of the following [G]:


                 [ ]i)    Tachycardia for age


                 [ ]ii)   Orthostatic vital sign changes as indicated by ANY ONE

 of the following(80):


                         [ ]1)    Fall in SBP of 20 mm Hg or more 1 to 3 

minutes after patient sits or stands from recumbent position


                         [ ]2)    Fall in DBP of 10 mm Hg or more 1 to 3 

minutes after patient sits or stands from recumbent position


[ ]XXII. High-risk low platelet count as indicated by ANY ONE of the following(

81)(82):


          [ ]a)  Severe or life-threatening bleeding (eg, intracranial, major 

gastrointestinal, or extensive mucosal bleeding),


                  with any reduced platelet count


          [ ]b)  Platelet count less than 20,000/mm3 (20 x109/L) with any 

active bleeding


          [ ]c)  Platelet count less than 10,000/mm3 (10 x109/L) with minor 

purpura or petechiae 


          [ ]d)  Platelet count less than 5000/mm3 (5 x109/L)


          [ ]e)  Low platelet count with hemolytic anemia


[ ]XXIII.  Disseminated intravascular coagulation(77)(83)


[ ]XXIV. Severe adverse drug or systemic toxin reaction requiring inpatient 

treatment; examples include(84)(85):          


          [ ]a)  Serotonin syndrome(86)


          [ ]b)  Neuroleptic malignant syndrome(86)


          [ ]c)  Cholinergic syndrome with severe symptoms (eg, bronchorrhea, 

weakness, mental status changes, seizures)


          [ ]d)  Sympathetic syndrome with severe symptoms (eg, seizures, 

mental status changes, cardiac dysrhythmias)


          [ ]e)   Anticholinergic syndrome


[ ]XXV.    Severe pain requiring acute inpatient management as indicated by ALL 

of the following (87)(88)(89):        


          [ ]a)  Continuous or frequent (eg, every 2 to 4 hours) parenteral 

analgesics required [H]


          [ ]b)  Rapid improvement expected from treatment or acute 

intervention (eg, surgery, anesthesia procedure)


[ ]XXVI.Severe behavioral health issues judged unmanageable at a lower level of 

care (eg, residential) in a 


          patient who is ANY ONE of the following(91)          


          [ ]a)  Acutely suicidal


          [ ]b)  A danger to self (eg, self-mutilating or suicidal behavior)


          [ ]c)  A danger to others (eg, assaultive or homicidal behavior)


          [ ]d)   Incapacitated because of grave disability (eg, inability to 

provide for self at lower level of care) (92) 


[X ]XXVII. Inpatient monitoring needed; examples include(1)(3)(87)(93)(94)(95)(

96):


          [ X]a)  Vital signs, neurologic signs, or vascular checks more 

frequently than every 4 hours


          [ ]b)  Cardiac or respiratory monitoring beyond the scope (eg, over 

24 hours) of observation care


          [ ]c)  Pulmonary artery catheter monitoring


          [ ]d)  Suspected compartment syndrome(97) (98)


          [ ]e)  Cerebral bleeding, hydrocephalus, or vasospasm monitoring


          [ ]f)   Increased intracranial pressure or cerebral edema monitoring


          [ ]g)  Fetal monitoring


[ ]XXVIII. Treatment requiring inpatient care; examples include:


          [ ]a)  IV fluid to replace significant ongoing losses (greater than 3 

L/m2 per day)(53)


          [ ]b)  High concentration oxygen (greater than 40%)(33)(99)(100)


          [ ]c)  Frequent respiratory therapy (more frequently than every 4 

hours) to maintain airflow rates greater 


                  than 60% of baseline(33)(99)(100)


          [ ]d)  Epidural analgesia(87)


          [ ]e)  IV anticoagulation, vasoactive, or antiarrhythmic medication(19

)(23)


          [ ]f)   Acute thrombolytics (generally require 24 hours of observation

)(101)(102)


[ ]XXIX.  Emergency procedures needed; examples include:


          [ ]a)  Emergency inpatient surgery


          [ ]b)  Temporary pacemaker placement(103)


          [ ]c)  Chest tube placement with active evacuation (eg, suction, 

drainage)(104)


          [ ]d)   Emergent cardioversion(105)


          [ ]e)  Emergent cardiac or vascular procedures (eg, cardiac 

catheterization, angioplasty) (17)(18)


          [ ]f)   Emergent dialysis access placement and institution(10)(106)


          [ ]g)  Emergent pericardiocentesis(107)


          [ ]h)  Emergent plasmapheresis or leukapheresis(83)


          [ ]i)   Emergent tracheostomy








The original [employer] content created by [employer] has been revised. 


The portions of the content which have been revised are identified through the 

use of italic text or in bold, and [facility] 


has neither reviewed nor approved the modified material. All other unmodified 

content is copyright  [employer].





Please see references footnoted in the original [employer] edition 

2016





Admit Criteria Met?: Yes

## 2017-08-22 NOTE — CONSULTATION
DATE OF CONSULTATION:  08/21/2017



INPATIENT GI CONSULTATION 



CONSULTING PHYSICIAN:  Dr. Matos.



REASON FOR CONSULTATION:  Malfunctioning G-tube.  Possible coffee-ground emesis.



HISTORY OF PRESENT ILLNESS:  The patient is a 31-year-old male with a history of

severe mental retardation, cerebral palsy, acid reflux disease, osteoporosis,

constipation, previous sigmoid volvulus requiring colon resection who presents

to the Emergency Room with nausea and vomiting.  Once the patient was seen in

the Emergency Room and the ER physician attempted to flush his previously placed

G-tube, it was noted that the G-tube was not able to be flushed well.  There was

some note that there may be coffee-ground emesis.  However, what was able to be

flushed out of the G-tube did not appear to be blood or have coffee-grounds in

it at the time the patient came to the floor.  At current time, the patient's

mother reports that she thinks he is very constipated and has not had a bowel

movement in some time.  The patient is unable to give any additional history as

he is nonverbal.



PAST MEDICAL HISTORY:  Cerebral palsy, severe mental retardation, GERD,

osteoporosis, and constipation.



PAST SURGICAL HISTORY:  Previously placed PEG tube, previous colon resection

secondary to sigmoid volvulus.



FAMILY HISTORY:  Noncontributory.



SOCIAL HISTORY:  The patient lives at an assisted care facility.  Does not use

tobacco products.  Does not drink alcohol.



ALLERGIES:  No known drug allergies.



REVIEW OF SYSTEMS:  This was not able to be performed given the patient's poor

mental status.



MEDICATIONS:  Tylenol, Fosamax, vitamin C, Dulcolax, calcium carbonate, vitamin

D3, Colace, Nexium, Fleet Enema, milk of magnesia, Meloxicam, multivitamin, and

MiraLax.



PHYSICAL EXAMINATION:

VITAL SIGNS:  Blood pressure 107/70, pulse of 63, temperature 98.3 Fahrenheit,

respiratory rate of 16, and oxygen saturation 93% on room air.

GENERAL:  The patient is lying in bed, somewhat contracted alert and oriented x

0.

HEENT:  There is no obvious scleral icterus.  Pupils are equal and reactive.

NECK:  No thyromegaly, no JVD, and no lymphadenopathy.

CHEST:  Clear to auscultation bilaterally.

ABDOMEN:  Soft.  PEG tube site is clean, dry, and intact.  No obvious

cellulitis.  No obvious pain to palpation.  No guarding and no rebound.

CARDIOVASCULAR:  S1 and S2 present.  No extra sounds.

EXTREMITIES:  No edema.  Positive pulses.



LABORATORY DATA:  White count 8.4, hemoglobin 16.1, and platelets 182,000.  INR

0.9.  Sodium 134, BUN 16, creatinine 0.6, AST 18, ALT 13, total bilirubin 0.4,

alkaline phosphatase 67, and albumin 4.6.



IMAGING STUDIES:  There are no current imaging studies performed.



IMPRESSION:  This is a 31-year-old male with history of cerebral palsy, severe

mental retardation, constipation, previous sigmoid volvulus requiring colon

resection, who is presenting with emesis and G-tube malfunction.

1.  G-tube malfunction.

2.  Nausea and vomiting.

3.  Obstipation.

4.  Cerebral palsy.

5.  Severe mental retardation.



DISCUSSION:  The patient's presentation with nausea and vomiting is likely due

to his ongoing problems with constipation, which is what his mother thinks as

well.  The G-tube is not flushing correctly, although what is coming out of it

does not appear like blood.  Thus, an upper GI bleed is unlikely.  His

hemoglobin is currently 16, which also supports that there is likely not a GI

bleed ongoing.  The patient's mother reports that the patient usually grinds his

teeth when he is severely constipated and this is relieved by enemas and

laxatives which may not have been able to be given with his malfunctioning tube.



PLAN:

1.  We will change the G-tube at bedside to a #20 Italian tube and confirm with

x-ray.

2.  We will administer mineral oil enemas now and then write an order for p.r.n.

use.

3.  Once the G-tube is confirmed, we can give oral laxatives through the G-tube.

4.  We will get a KUB to monitor progress of stool evacuation.

5.  We will defer upper endoscopy at this time unless the patient shows obvious

signs of upper GI bleeding or acute hemoglobin drop.



Thank you for allowing me to participate in the care of this patient.  Please

call with any further questions.





DD: 08/21/2017 16:48

DT: 08/21/2017 22:18

JOB# [id number]  [id number]

## 2017-08-22 NOTE — DIAGNOSTIC IMAGING REPORT
Exam: Upper GI series, placement of gastrostomy tube



The KUB of the abdomen was reviewed the study demonstrates distention of

small bowel large bowel loops most likely consistent with pelvic ileus.



After injection of contrast material gastrostomy tube there is normal

opacification of stomach.



Large amount of fecal impaction is noted in the lower abdomen.



IMPRESSION: Gastrostomy tube in stomach



Generalized distention of the large and small bowel loops question of

severe ileus



Distal fecal impaction within the rectosigmoid junction and rectum

appreciated clinical correlation is recommended.

## 2017-08-22 NOTE — OPERATIVE REPORT
DATE OF SURGERY:  08/21/2017



PROCEDURE:  PEG change.



PROCEDURE WAS PERFORMED BY:  Carlos Gaona M.D.



INDICATIONS:  A 31-year-old male with severe mental retardation who has a

previously placed PEG tube and was brought to the ER with vomiting and found to

have a malfunctioning PEG tube.



PREOPERATIVE DIAGNOSIS:  Malfunctioning PEG tube.



POSTOPERATIVE DIAGNOSIS:  Malfunctioning PEG tube.



ANESTHESIA:  There was no anesthesia given during this procedure.



PROCEDURE IN DETAIL:  The previous PEG tube was carefully inspected.  There was

no obvious erythema or infection around the site of the PEG tube.  Using a

normal saline syringe, the internal balloon was then taken down, 15 mL of water

was removed.  The malfunctioning PEG tube was then carefully pulled out.  A new

20-Ukrainian PEG tube was lubricated and carefully inserted into the

gastrocutaneous fistula.  The internal balloon was then inflated with 10 mL of

normal saline.  The bumper was put in place.  PEG tube was then able to be

twisted, although it was firmly snug against the skin.  There were no

complications at the conclusion of this procedure.



IMPRESSION AND RECOMMENDATIONS:  An abdominal plain film with Gastrografin

injected into the PEG tube.  If the internal PEG tube is confirmed to be within

the stomach, it can be immediately used.



Thank you for allowing me to participate in the care of this patient.  Please

call with any further questions.





DD: 08/21/2017 16:40

DT: 08/21/2017 16:55

JOB# [id number]  [id number]

## 2017-08-22 NOTE — GI PROGRESS NOTE
Subjective





- Review of Systems


Service Date: 08/22/17


Subjective: 


Pt's mother reports no BM since yesterday. PEG tube is functioning, swallow 

eval result pending.





Objective





- Results


Result Diagrams: 


 08/22/17 06:10





 08/22/17 06:10


Recent Labs: 


 Laboratory Last Values











WBC  4.0 Th/cmm (4.8-10.8)  L D 08/22/17  06:10    


 


RBC  4.05 Mil/cmm (4.30-5.70)  L  08/22/17  06:10    


 


Hgb  12.2 gm/dL (13.2-17.3)  L D 08/22/17  06:10    


 


Hct  36.3 % (39.0-49.0)  L D 08/22/17  06:10    


 


MCV  89.5 fl (80-99)   08/22/17  06:10    


 


MCH  30.1 pg (26.0-30.0)  H  08/22/17  06:10    


 


MCHC Differential  33.7 pg (28.0-36.0)   08/22/17  06:10    


 


RDW  13.6 % (11.5-20.0)   08/22/17  06:10    


 


Plt Count  146 Th/cmm (150-400)  L  08/22/17  06:10    


 


MPV  7.3 fl  08/22/17  06:10    


 


Neutrophils %  79.4 % (40.0-80.0)   08/21/17  05:40    


 


Lymphocytes %  12.6 % (20.0-50.0)  L  08/21/17  05:40    


 


Monocytes %  7.2 % (2.0-10.0)   08/21/17  05:40    


 


Eosinophils %  0.7 % (0.0-5.0)   08/21/17  05:40    


 


Basophils %  0.1 % (0.0-2.0)   08/21/17  05:40    


 


Neutrophils (Manual)  38 % (40-80)  L  08/22/17  06:10    


 


Lymphocytes  50 % (20-50)   08/22/17  06:10    


 


Monocytes  10 % (2-10)   08/22/17  06:10    


 


Eosinophils  2 % (0-5)   08/22/17  06:10    


 


Platelet Estimate  ADEQUATE  (NORMAL)   08/22/17  06:10    


 


Platelet Morphology  NORMAL  (NORMAL)   08/22/17  06:10    


 


RBC Morph Micro Appear  NORMAL  (NORMAL)   08/22/17  06:10    


 


PT  10.1 SECONDS (9.5-11.5)   08/21/17  05:40    


 


INR  0.97  (0.5-1.4)   08/21/17  05:40    


 


PTT (Actin FS)  28.0 SECONDS (26.0-38.0)   08/21/17  05:40    


 


Sodium  137 mEq/L (136-145)   08/22/17  06:10    


 


Potassium  3.9 mEq/L (3.5-5.1)   08/22/17  06:10    


 


Chloride  107 mEq/L ()   08/22/17  06:10    


 


Carbon Dioxide  26.8 mEq/L (21.0-31.0)   08/22/17  06:10    


 


Anion Gap  7.1  (7.0-16.0)   08/22/17  06:10    


 


BUN  9 mg/dL (7-25)   08/22/17  06:10    


 


Creatinine  0.5 mg/dL (0.7-1.3)  L  08/22/17  06:10    


 


Est GFR ( Amer)  > 60.0 ml/min (>90)   08/22/17  06:10    


 


Est GFR (Non-Af Amer)  > 60.0 ml/min  08/22/17  06:10    


 


BUN/Creatinine Ratio  18.0   08/22/17  06:10    


 


Glucose  95 mg/dL ()   08/22/17  06:10    


 


Calcium  8.4 mg/dL (8.6-10.3)  L  08/22/17  06:10    


 


Magnesium  2.3 mg/dL (1.9-2.7)   08/22/17  06:10    


 


Total Bilirubin  0.4 mg/dL (0.3-1.0)   08/21/17  05:40    


 


AST  18 U/L (13-39)   08/21/17  05:40    


 


ALT  13 U/L (7-52)   08/21/17  05:40    


 


Alkaline Phosphatase  67 U/L ()   08/21/17  05:40    


 


Total Protein  8.2 gm/dL (6.0-8.3)   08/21/17  05:40    


 


Albumin  4.6 gm/dL (4.2-5.5)   08/21/17  05:40    


 


Globulin  3.6 gm/dL  08/21/17  05:40    


 


Albumin/Globulin Ratio  1.3  (1.0-1.8)   08/21/17  05:40    


 


Blood Type  O POSITIVE   08/21/17  05:40    


 


Antibody Screen  NEGATIVE   08/21/17  05:40    














- Physical Exam


Vitals and I&O: 


 Vital Signs











Temp  97.9 F   08/22/17 12:00


 


Pulse  74   08/22/17 12:00


 


Resp  18   08/22/17 12:00


 


BP  118/63   08/22/17 12:00


 


Pulse Ox  99   08/22/17 12:00








 Intake & Output











 08/21/17 08/22/17 08/22/17





 18:59 06:59 18:59


 


Intake Total 0 1000.0 


 


Balance 0 1000.0 


 


Weight (lbs) 46.266 kg 50.167 kg 


 


Intake:   


 


  Intake, IV Amount  1000.0 


 


    D5-0.45NS 1,000 ml @ 75  1000.0 





    mls/hr IV .[id number] [facility] Rx   





    #:[id number]   


 


  Oral 0  


 


Other:   


 


  # Voids 3 2 


 


  # Bowel Movements 1  











Active Medications: 


Current Medications





Acetaminophen (Tylenol 650mg Supp)  650 mg RC Q8H PRN


   PRN Reason: Pain or Fever >101


   Stop: 10/20/17 06:14


Acetaminophen (Tylenol)  650 mg GT Q4HR PRN


   PRN Reason: Pain or Fever >101


   Stop: 10/21/17 09:31


Alendronate Sodium (Fosamax)  70 mg PO QSAT [facility]


   Stop: 10/25/17 06:29


Ascorbic Acid (Vitamin C)  500 mg GT DAILY [facility]


   Stop: 10/21/17 09:44


   Last Admin: 08/22/17 10:02 Dose:  500 mg


Bisacodyl (Dulcolax 10 Mg Supp)  10 mg RC Q96H PRN


   PRN Reason: Constipation


   Stop: 10/21/17 09:31


Calcium Carbonate (Os-Jasvir)  500 mg GT DAILY [facility]


   Stop: 10/21/17 09:44


   Last Admin: 08/22/17 10:02 Dose:  500 mg


Docusate Sodium (Colace)  200 mg GT Q12H [facility]


   Stop: 10/21/17 09:44


   Last Admin: 08/22/17 10:02 Dose:  200 mg


Dextrose/Sodium Chloride (D5-0.45ns)  1,000 mls @ 75 mls/hr IV .[id number] [facility]


   Stop: 10/20/17 06:10


   Last Admin: 08/22/17 04:07 Dose:  75 mls/hr


Magnesium Hydroxide (Milk Of Magnesia)  30 ml GT Q72H PRN


   PRN Reason: Constipation


   Stop: 10/21/17 09:31


Pantoprazole Sodium (Protonix)  40 mg IVP DAILY [facility]


   Stop: 10/20/17 08:59


   Last Admin: 08/22/17 10:02 Dose:  40 mg


Pantoprazole Sodium (Protonix)  40 mg GT QDAC [facility]


   Stop: 10/22/17 07:29


Polyethylene Glycol (Miralax)  17 gm GT BID [facility]


   Stop: 10/21/17 09:44


   Last Admin: 08/22/17 10:02 Dose:  17 gm


Polyethylene Glycol/Electrolytes (Golytely)  4,000 ml PO X1 ONE


   Stop: 08/22/17 16:16


Sodium Phosphate (Fleet Enema)  135 ml RC Q96H PRN


   PRN Reason: IF DULCOLAX SUPP. INEFFECTIVE


   Stop: 10/21/17 09:31


Vitamin D (Vitamin D)  400 iu GT DAILY [facility]


   Stop: 10/21/17 09:44


   Last Admin: 08/22/17 10:02 Dose:  400 iu








General: Alert


HEENT: Atraumatic, EOMI, Mucous membr. moist/pink


Neck: Supple, Other (mo LAD)


Cardiovascular: Normal S1, Normal S2


Lungs: Clear to auscultation


Abdomen: Soft, Other (non tender, non distended)





- Procedures


Procedures: 


 Procedures











Procedure Code Date


 


DIAGNOSTIC COLONOSCOPY 22320 01/12/17


 


EGD BIOPSY SINGLE/MULTIPLE 24716 07/10/14


 


ESOPHAGOGASTRODUODENOSCOPY [EGD] W/CLOSED BIOPSY 45.16 07/10/14


 


INFLUENZA VACCINATION 99.52 01/30/14


 


INSERT INDWELLING CATH 57.94 12/03/12


 


INSERT INTESTINAL TUBE 96.08 12/03/12


 


INSERT PICC CATH [id number] 12/03/12


 


INSERT TEMP BLADDER CATH [id number] 12/03/12


 


INSPECTION OF LOWER INTESTINAL TRACT, ENDO [id number] 01/12/17


 


OPEN AND OTHER CECECTOMY 45.72 12/03/12


 


OPEN AND OTHER RIGHT HEMICOLECTOMY 45.73 12/03/12


 


PARTIAL REMOVAL OF COLON 46362 12/03/12


 


UNLISTED PX SMALL INTESTINE 08274 12/03/12


 


VENOUS CATHETERIZATION NEC 38.93 12/03/12














Assessment/Plan





- Problem List


Patient Problems: 


All Active Problems





CONSTIPATION WITH POOR INTAKE (Acute) 


coffee ground vomitus (Acute) 











- Assessment


Assessment: 


# PEG tube malfunction





- s/p G tube change on 8/21, functioning well at this time


- No evidence of GI bleeding when flushing the G tube. Drop in hgb is likely 

dilutional given all cell lines dropped





# Constipation





- Received mineral oil enema yesterday without BM, and taking daily miralax.


- Will administer a slow 4L golytely bowel prep tonight in order to help 

relieve obstipation (there is no evidence of bowel obstruction)


- Continue enemas

## 2017-08-23 LAB
ANION GAP SERPL CALC-SCNC: 6.6 MMOL/L (ref 7–16)
BASOPHILS NFR BLD AUTO: 0.1 % (ref 0–2)
BUN SERPL-MCNC: 8 MG/DL (ref 7–25)
BUN/CREAT SERPL: 13.3
CALCIUM SERPL-MCNC: 9.1 MG/DL (ref 8.6–10.3)
CHLORIDE SERPL-SCNC: 108 MEQ/L (ref 98–107)
CO2 SERPL-SCNC: 26.7 MEQ/L (ref 21–31)
CREAT SERPL-MCNC: 0.6 MG/DL (ref 0.7–1.3)
EOSINOPHIL NFR BLD AUTO: 4.1 % (ref 0–5)
ERYTHROCYTE [DISTWIDTH] IN BLOOD BY AUTOMATED COUNT: 13.5 % (ref 11.5–20)
GLUCOSE SERPL-MCNC: 96 MG/DL (ref 70–105)
HCT VFR BLD CALC: 36.7 % (ref 39–49)
HGB BLD-MCNC: 12.6 GM/DL (ref 13.2–17.3)
LYMPHOCYTES NFR BLD AUTO: 41.9 % (ref 20–50)
MAGNESIUM SERPL-MCNC: 2.5 MG/DL (ref 1.9–2.7)
MCH RBC QN AUTO: 30.1 PG (ref 26–30)
MCHC RBC AUTO-ENTMCNC: 34.4 PG (ref 28–36)
MCV RBC AUTO: 87.7 FL (ref 80–99)
MONOCYTES NFR BLD AUTO: 7.6 % (ref 2–10)
NEUTROPHILS # BLD: 2.2 TH/CMM (ref 1.8–8)
NEUTROPHILS NFR BLD AUTO: 46.3 % (ref 40–80)
PLATELET # BLD: 139 TH/CMM (ref 150–400)
PMV BLD AUTO: 7.4 FL
POTASSIUM SERPL-SCNC: 4.3 MEQ/L (ref 3.5–5.1)
RBC # BLD AUTO: 4.18 MIL/CMM (ref 4.3–5.7)
SODIUM SERPL-SCNC: 137 MEQ/L (ref 136–145)
WBC # BLD AUTO: 4.6 TH/CMM (ref 4.8–10.8)

## 2017-08-23 RX ADMIN — DOCUSATE SODIUM SCH MG: 50 LIQUID ORAL at 16:41

## 2017-08-23 RX ADMIN — POLYETHYLENE GLYCOL 3350 SCH GM: 17 POWDER, FOR SOLUTION ORAL at 09:03

## 2017-08-23 RX ADMIN — POLYETHYLENE GLYCOL 3350 SCH GM: 17 POWDER, FOR SOLUTION ORAL at 16:39

## 2017-08-23 RX ADMIN — Medication SCH TAB: at 09:03

## 2017-08-23 RX ADMIN — DOCUSATE SODIUM SCH MG: 50 LIQUID ORAL at 23:30

## 2017-08-23 RX ADMIN — DEXTROSE AND SODIUM CHLORIDE SCH MLS/HR: 5; .45 INJECTION, SOLUTION INTRAVENOUS at 09:19

## 2017-08-23 RX ADMIN — DEXTROSE AND SODIUM CHLORIDE SCH MLS/HR: 5; .45 INJECTION, SOLUTION INTRAVENOUS at 23:30

## 2017-08-23 NOTE — GI PROGRESS NOTE
Subjective





- Review of Systems


Subjective: 


s/p golytely prep last night, and RN reports pt had multiple BMs.





Objective





- Results


Result Diagrams: 


 08/23/17 06:00





 08/23/17 06:00


Recent Labs: 


 Laboratory Last Values











WBC  4.6 Th/cmm (4.8-10.8)  L  08/23/17  06:00    


 


RBC  4.18 Mil/cmm (4.30-5.70)  L  08/23/17  06:00    


 


Hgb  12.6 gm/dL (13.2-17.3)  L  08/23/17  06:00    


 


Hct  36.7 % (39.0-49.0)  L  08/23/17  06:00    


 


MCV  87.7 fl (80-99)   08/23/17  06:00    


 


MCH  30.1 pg (26.0-30.0)  H  08/23/17  06:00    


 


MCHC Differential  34.4 pg (28.0-36.0)   08/23/17  06:00    


 


RDW  13.5 % (11.5-20.0)   08/23/17  06:00    


 


Plt Count  139 Th/cmm (150-400)  L  08/23/17  06:00    


 


MPV  7.4 fl  08/23/17  06:00    


 


Neutrophils %  46.3 % (40.0-80.0)   08/23/17  06:00    


 


Lymphocytes %  41.9 % (20.0-50.0)   08/23/17  06:00    


 


Monocytes %  7.6 % (2.0-10.0)   08/23/17  06:00    


 


Eosinophils %  4.1 % (0.0-5.0)   08/23/17  06:00    


 


Basophils %  0.1 % (0.0-2.0)   08/23/17  06:00    


 


Neutrophils (Manual)  38 % (40-80)  L  08/22/17  06:10    


 


Lymphocytes  50 % (20-50)   08/22/17  06:10    


 


Monocytes  10 % (2-10)   08/22/17  06:10    


 


Eosinophils  2 % (0-5)   08/22/17  06:10    


 


Platelet Estimate  ADEQUATE  (NORMAL)   08/22/17  06:10    


 


Platelet Morphology  NORMAL  (NORMAL)   08/22/17  06:10    


 


RBC Morph Micro Appear  NORMAL  (NORMAL)   08/22/17  06:10    


 


PT  10.1 SECONDS (9.5-11.5)   08/21/17  05:40    


 


INR  0.97  (0.5-1.4)   08/21/17  05:40    


 


PTT (Actin FS)  28.0 SECONDS (26.0-38.0)   08/21/17  05:40    


 


Sodium  137 mEq/L (136-145)   08/23/17  06:00    


 


Potassium  4.3 mEq/L (3.5-5.1)   08/23/17  06:00    


 


Chloride  108 mEq/L ()  H  08/23/17  06:00    


 


Carbon Dioxide  26.7 mEq/L (21.0-31.0)   08/23/17  06:00    


 


Anion Gap  6.6  (7.0-16.0)  L  08/23/17  06:00    


 


BUN  8 mg/dL (7-25)   08/23/17  06:00    


 


Creatinine  0.6 mg/dL (0.7-1.3)  L  08/23/17  06:00    


 


Est GFR ( Amer)  > 60.0 ml/min (>90)   08/23/17  06:00    


 


Est GFR (Non-Af Amer)  > 60.0 ml/min  08/23/17  06:00    


 


BUN/Creatinine Ratio  13.3   08/23/17  06:00    


 


Glucose  96 mg/dL ()   08/23/17  06:00    


 


Calcium  9.1 mg/dL (8.6-10.3)   08/23/17  06:00    


 


Magnesium  2.5 mg/dL (1.9-2.7)   08/23/17  06:00    


 


Total Bilirubin  0.4 mg/dL (0.3-1.0)   08/21/17  05:40    


 


AST  18 U/L (13-39)   08/21/17  05:40    


 


ALT  13 U/L (7-52)   08/21/17  05:40    


 


Alkaline Phosphatase  67 U/L ()   08/21/17  05:40    


 


Total Protein  8.2 gm/dL (6.0-8.3)   08/21/17  05:40    


 


Albumin  4.6 gm/dL (4.2-5.5)   08/21/17  05:40    


 


Globulin  3.6 gm/dL  08/21/17  05:40    


 


Albumin/Globulin Ratio  1.3  (1.0-1.8)   08/21/17  05:40    


 


Blood Type  O POSITIVE   08/21/17  05:40    


 


Antibody Screen  NEGATIVE   08/21/17  05:40    














- Physical Exam


Vitals and I&O: 


 Vital Signs











Temp  98.0 F   08/23/17 04:00


 


Pulse  72   08/23/17 04:00


 


Resp  19   08/23/17 04:00


 


BP  112/72   08/23/17 04:00


 


Pulse Ox  96   08/23/17 04:00








 Intake & Output











 08/22/17 08/23/17 08/23/17





 18:59 06:59 18:59


 


Intake Total 1600  933.75


 


Output Total 50  


 


Balance 1550  933.75


 


Weight (lbs) 49.895 kg 47.355 kg 


 


Intake:   


 


  Intake, IV Amount 1000  933.75


 


    D5-0.45NS 1,000 ml @ 75 1000  933.75





    mls/hr IV .[id number] [facility]   





    #:[id number]   


 


  Oral 200  


 


  Other 400  


 


Output:   


 


  Other 50  


 


Other:   


 


  # Voids 3  


 


  # Bowel Movements 0  


 


  Stool Characteristics  Liquid 





  Brown 











Active Medications: 


Current Medications





Acetaminophen (Tylenol 650mg Supp)  650 mg RC Q8H PRN


   PRN Reason: Pain or Fever >101


   Stop: 10/20/17 06:14


Acetaminophen (Tylenol)  650 mg GT Q4HR PRN


   PRN Reason: Pain or Fever >101


   Stop: 10/21/17 09:31


Alendronate Sodium (Fosamax)  70 mg PO QSAT [facility]


   Stop: 10/25/17 06:29


Ascorbic Acid (Vitamin C)  500 mg GT DAILY [facility]


   Stop: 10/21/17 09:44


   Last Admin: 08/23/17 09:03 Dose:  500 mg


Bisacodyl (Dulcolax 10 Mg Supp)  10 mg RC Q96H PRN


   PRN Reason: Constipation


   Stop: 10/21/17 09:31


Calcium Carbonate (Os-Jasvir)  500 mg GT DAILY [facility]


   Stop: 10/21/17 09:44


   Last Admin: 08/23/17 09:03 Dose:  500 mg


Docusate Sodium (Colace)  200 mg GT Q12H [facility]


   Stop: 10/21/17 09:44


   Last Admin: 08/22/17 20:55 Dose:  200 mg


Dextrose/Sodium Chloride (D5-0.45ns)  1,000 mls @ 75 mls/hr IV .[id number] [facility]


   Stop: 10/20/17 06:10


   Last Admin: 08/23/17 09:19 Dose:  75 mls/hr


Magnesium Hydroxide (Milk Of Magnesia)  30 ml GT Q72H PRN


   PRN Reason: Constipation


   Stop: 10/21/17 09:31


Pantoprazole Sodium (Protonix)  40 mg IVP DAILY [facility]


   Stop: 10/20/17 08:59


   Last Admin: 08/23/17 09:03 Dose:  40 mg


Pantoprazole Sodium (Protonix)  40 mg GT QDAC [facility]


   Stop: 10/22/17 07:29


   Last Admin: 08/23/17 06:59 Dose:  40 mg


Polyethylene Glycol (Miralax)  17 gm GT BID [facility]


   Stop: 10/21/17 09:44


   Last Admin: 08/23/17 09:03 Dose:  17 gm


Sodium Phosphate (Fleet Enema)  135 ml RC Q96H PRN


   PRN Reason: IF DULCOLAX SUPP. INEFFECTIVE


   Stop: 10/21/17 09:31


Vitamin D (Vitamin D)  400 iu GT DAILY [facility]


   Stop: 10/21/17 09:44


   Last Admin: 08/23/17 09:03 Dose:  400 iu








General: Alert


HEENT: Atraumatic, EOMI, Mucous membr. moist/pink


Neck: Supple, Other (mo LAD)


Cardiovascular: Normal S1, Normal S2


Lungs: Clear to auscultation


Abdomen: Soft, Other (non tender, non distended)





- Procedures


Procedures: 


 Procedures











Procedure Code Date


 


DIAGNOSTIC COLONOSCOPY 88639 01/12/17


 


EGD BIOPSY SINGLE/MULTIPLE 60320 07/10/14


 


ESOPHAGOGASTRODUODENOSCOPY [EGD] W/CLOSED BIOPSY 45.16 07/10/14


 


INFLUENZA VACCINATION 99.52 01/30/14


 


INSERT INDWELLING CATH 57.94 12/03/12


 


INSERT INTESTINAL TUBE 96.08 12/03/12


 


INSERT PICC CATH 90022 12/03/12


 


INSERT TEMP BLADDER CATH 58801 12/03/12


 


INSPECTION OF LOWER INTESTINAL TRACT, ENDO [id number] 01/12/17


 


OPEN AND OTHER CECECTOMY 45.72 12/03/12


 


OPEN AND OTHER RIGHT HEMICOLECTOMY 45.73 12/03/12


 


PARTIAL REMOVAL OF COLON 40956 12/03/12


 


UNLISTED PX SMALL INTESTINE 38226 12/03/12


 


VENOUS CATHETERIZATION NEC 38.93 12/03/12














Assessment/Plan





- Problem List


Patient Problems: 


All Active Problems





CONSTIPATION WITH POOR INTAKE (Acute) 


coffee ground vomitus (Acute) 











- Assessment


Assessment: 


# PEG tube malfunction





- s/p G tube change on 8/21, functioning well at this time


- No evidence of GI bleeding when flushing the G tube. Drop in hgb is likely 

dilutional given all cell lines dropped





# Constipation





- Now s/p golytely bowel prep on 8/22 with multiple BMs. Improved. Would 

maintain pt on strong bowel regimen on discharge





Thank you for allowing me to participate in the care of this patient.

## 2017-08-23 NOTE — DISCHARGE SUMMARY
DATE OF DISCHARGE:     08/24/2017



ADMITTING DIAGNOSES:

1.  Coffee ground emesis, rule out gastrointestinal bleed.

2.  Malfunctioning G-tube.



SECONDARY DIAGNOSES:

1.  Include history of dysphagia secondary to severe mental retardation.

2.  History of PEG placement.

3.  History of mental retardation/cerebral palsy.

4.  History of osteoarthritis.

5.  History of osteoporosis.

6.  History of constipation.



DISCHARGE DIAGNOSES:

1.  Coffee-ground emesis, possible upper gastrointestinal bleed, clinically

resolved.

2.  Malfunctioning gastrostomy tube, status post replacement.

3.  Constipation/fecal impaction/ileus, clinically/objectively improved.



CONSULTANTS:  Dr. Webber.



MAJOR PROCEDURES:  

There was upper GI done on 08/21/2017 showing gastrostomy

tube in stomach.  There was generalized distention of the large and small bowel

loops with possible ileus.  Distal fecal impaction within the rectosigmoid

junction in the rectum.  



On 08/21/2017, prior to the above-mentioned procedure,

he underwent a G-tube replacement without any complications.



On 08/24/2017 repeat kub showed mild distention of small and large bowel loops

without obstruction and no fecal impaction.



BRIEF HOSPITAL COURSE:  A 31-year-old male with history of mental retardation,

cerebral palsy, acid reflux disease, constipation, osteoporosis, who was

transferred from [facility] secondary to coffee-ground emesis

noted.  It was unsure if the coffee grounds were noted on the G-tube suction or

if he actually had emesis, but nevertheless, he was evaluated in the ER where

his labs were essentially within normal limits with an H and H of 16/48 and

sodium was slightly low at 134.  ER staff attempted to suction and flush the

G-tube, but this was noted to be plugged; therefore, he was admitted to the

Medical/Surgical floor for further GI evaluation.  He was seen by GI and the

above-mentioned procedure took place at bedside without any complications. 

Since admission, he has not had any nausea, vomiting or any coffee grounds noted

from the G-tube replacement.  He was placed on IV fluids and Protonix, and the

day after the PEG tube, he was started on feeds, which he has tolerated well. 

He also underwent an ST eval given that in the past he was taking p.o.'s.  ST

eval showed oral phase impairment with decreased bolus acceptance and

containment.  They suggest pureed nectar thickened liquids with medications via

PEG and for all meals and with feeder assistance.  The patient has remained 
stable,

afebrile, and his labs also remained stable.



MEDICATIONS ON DISCHARGE:  Acetaminophen per rectum q. 8 hours p.r.n. for fever

or pain, alendronate 70 mg q. Saturday, ascorbic acid 500 mg every day, Dulcolax

10 mg q. 96 hours p.r.n. for constipation, calcium carbonate 500 mg every day,

docusate sodium 200 mg q. 12, milk of magnesia 30 mL q. 72 hours p.r.n.

constipation, Protonix 40 mg p.o. daily, MiraLax 17 g b.i.d., and Fleet enema

135 mL q. 96 hours for severe constipation, vitamin D 400 international units

daily.



DISPOSITION:  The patient will be discharged today to [facility] under

the care of Dr. Mc.





DD: 08/23/2017 10:00

DT: 08/23/2017 10:43

JOB# [id number]  [id number]

[facility]

## 2017-08-24 LAB
ANION GAP SERPL CALC-SCNC: 10.1 MMOL/L (ref 7–16)
BASOPHILS NFR BLD AUTO: 0.1 % (ref 0–2)
BUN SERPL-MCNC: 10 MG/DL (ref 7–25)
BUN/CREAT SERPL: 16.7
CALCIUM SERPL-MCNC: 9.3 MG/DL (ref 8.6–10.3)
CHLORIDE SERPL-SCNC: 105 MEQ/L (ref 98–107)
CO2 SERPL-SCNC: 23.8 MEQ/L (ref 21–31)
CREAT SERPL-MCNC: 0.6 MG/DL (ref 0.7–1.3)
EOSINOPHIL NFR BLD AUTO: 2.8 % (ref 0–5)
ERYTHROCYTE [DISTWIDTH] IN BLOOD BY AUTOMATED COUNT: 13.2 % (ref 11.5–20)
GLUCOSE SERPL-MCNC: 102 MG/DL (ref 70–105)
HCT VFR BLD CALC: 39.6 % (ref 39–49)
HGB BLD-MCNC: 13 GM/DL (ref 13.2–17.3)
LYMPHOCYTES NFR BLD AUTO: 31.6 % (ref 20–50)
MCH RBC QN AUTO: 29.4 PG (ref 26–30)
MCHC RBC AUTO-ENTMCNC: 32.9 PG (ref 28–36)
MCV RBC AUTO: 89.4 FL (ref 80–99)
MONOCYTES NFR BLD AUTO: 8.1 % (ref 2–10)
NEUTROPHILS # BLD: 3.3 TH/CMM (ref 1.8–8)
NEUTROPHILS NFR BLD AUTO: 57.4 % (ref 40–80)
PLATELET # BLD: 150 TH/CMM (ref 150–400)
PMV BLD AUTO: 7.4 FL
POTASSIUM SERPL-SCNC: 3.9 MEQ/L (ref 3.5–5.1)
RBC # BLD AUTO: 4.43 MIL/CMM (ref 4.3–5.7)
SODIUM SERPL-SCNC: 135 MEQ/L (ref 136–145)
WBC # BLD AUTO: 5.9 TH/CMM (ref 4.8–10.8)

## 2017-08-24 RX ADMIN — POLYETHYLENE GLYCOL 3350 SCH GM: 17 POWDER, FOR SOLUTION ORAL at 09:58

## 2017-08-24 RX ADMIN — Medication SCH TAB: at 09:58

## 2017-08-24 RX ADMIN — DOCUSATE SODIUM SCH MG: 50 LIQUID ORAL at 09:58

## 2017-08-24 RX ADMIN — POLYETHYLENE GLYCOL 3350 SCH GM: 17 POWDER, FOR SOLUTION ORAL at 16:28

## 2017-08-24 NOTE — DIAGNOSTIC IMAGING REPORT
KUB abdominal film (portable)



HISTORY: Abdominal distention



The exam demonstrates multiple loops of mildly dilated large and small

bowel. The overall appearance is suggestive of an adynamic ileus.

Clinical correlation is needed. Balloon-tip catheter projects over the

upper mid abdomen.



IMPRESSION:

1. Mildly dilated loops of large and small bowel. Findings may be

associated with an adynamic ileus. Obstruction is a less likely

consideration.

2. Chronic deformities about the hips

## 2017-08-24 NOTE — GI PROGRESS NOTE
Subjective





- Review of Systems


Subjective: 


Continues to have bowel movements. KUB today shows mildly dilated loops of bowel

, no obstruction.





Objective





- Results


Result Diagrams: 


 08/24/17 05:09





 08/24/17 05:09


Recent Labs: 


 Laboratory Last Values











WBC  5.9 Th/cmm (4.8-10.8)  D 08/24/17  05:09    


 


RBC  4.43 Mil/cmm (4.30-5.70)   08/24/17  05:09    


 


Hgb  13.0 gm/dL (13.2-17.3)  L  08/24/17  05:09    


 


Hct  39.6 % (39.0-49.0)   08/24/17  05:09    


 


MCV  89.4 fl (80-99)   08/24/17  05:09    


 


MCH  29.4 pg (26.0-30.0)   08/24/17  05:09    


 


MCHC Differential  32.9 pg (28.0-36.0)   08/24/17  05:09    


 


RDW  13.2 % (11.5-20.0)   08/24/17  05:09    


 


Plt Count  150 Th/cmm (150-400)   08/24/17  05:09    


 


MPV  7.4 fl  08/24/17  05:09    


 


Neutrophils %  57.4 % (40.0-80.0)   08/24/17  05:09    


 


Lymphocytes %  31.6 % (20.0-50.0)   08/24/17  05:09    


 


Monocytes %  8.1 % (2.0-10.0)   08/24/17  05:09    


 


Eosinophils %  2.8 % (0.0-5.0)   08/24/17  05:09    


 


Basophils %  0.1 % (0.0-2.0)   08/24/17  05:09    


 


Neutrophils (Manual)  38 % (40-80)  L  08/22/17  06:10    


 


Lymphocytes  50 % (20-50)   08/22/17  06:10    


 


Monocytes  10 % (2-10)   08/22/17  06:10    


 


Eosinophils  2 % (0-5)   08/22/17  06:10    


 


Platelet Estimate  ADEQUATE  (NORMAL)   08/22/17  06:10    


 


Platelet Morphology  NORMAL  (NORMAL)   08/22/17  06:10    


 


RBC Morph Micro Appear  NORMAL  (NORMAL)   08/22/17  06:10    


 


PT  10.1 SECONDS (9.5-11.5)   08/21/17  05:40    


 


INR  0.97  (0.5-1.4)   08/21/17  05:40    


 


PTT (Actin FS)  28.0 SECONDS (26.0-38.0)   08/21/17  05:40    


 


Sodium  135 mEq/L (136-145)  L  08/24/17  05:09    


 


Potassium  3.9 mEq/L (3.5-5.1)   08/24/17  05:09    


 


Chloride  105 mEq/L ()   08/24/17  05:09    


 


Carbon Dioxide  23.8 mEq/L (21.0-31.0)   08/24/17  05:09    


 


Anion Gap  10.1  (7.0-16.0)   08/24/17  05:09    


 


BUN  10 mg/dL (7-25)   08/24/17  05:09    


 


Creatinine  0.6 mg/dL (0.7-1.3)  L  08/24/17  05:09    


 


Est GFR ( Amer)  > 60.0 ml/min (>90)   08/24/17  05:09    


 


Est GFR (Non-Af Amer)  > 60.0 ml/min  08/24/17  05:09    


 


BUN/Creatinine Ratio  16.7   08/24/17  05:09    


 


Glucose  102 mg/dL ()   08/24/17  05:09    


 


Calcium  9.3 mg/dL (8.6-10.3)   08/24/17  05:09    


 


Magnesium  2.5 mg/dL (1.9-2.7)   08/23/17  06:00    


 


Total Bilirubin  0.4 mg/dL (0.3-1.0)   08/21/17  05:40    


 


AST  18 U/L (13-39)   08/21/17  05:40    


 


ALT  13 U/L (7-52)   08/21/17  05:40    


 


Alkaline Phosphatase  67 U/L ()   08/21/17  05:40    


 


Total Protein  8.2 gm/dL (6.0-8.3)   08/21/17  05:40    


 


Albumin  4.6 gm/dL (4.2-5.5)   08/21/17  05:40    


 


Globulin  3.6 gm/dL  08/21/17  05:40    


 


Albumin/Globulin Ratio  1.3  (1.0-1.8)   08/21/17  05:40    


 


Blood Type  O POSITIVE   08/21/17  05:40    


 


Antibody Screen  NEGATIVE   08/21/17  05:40    














- Physical Exam


Vitals and I&O: 


 Vital Signs











Temp  98.0 F   08/24/17 08:00


 


Pulse  61   08/24/17 08:00


 


Resp  19   08/24/17 08:00


 


BP  124/85   08/24/17 08:00


 


Pulse Ox  98   08/24/17 08:00








 Intake & Output











 08/23/17 08/24/17 08/24/17





 18:59 06:59 18:59


 


Intake Total 1933.75 1200 


 


Balance 1933.75 1200 


 


Weight (lbs) 47.355 kg 48.308 kg 


 


Intake:   


 


  Intake, IV Amount 933.75 1000 


 


    D5-0.45NS 1,000 ml @ 75 933.75 1000 





    mls/hr IV .[id number] [facility]   





    #:[id number]   


 


  Oral 600  


 


  Albumin  200 


 


  Other 400  


 


Other:   


 


  # Voids 4 2 


 


  # Bowel Movements 0 1 


 


  Stool Characteristics Liquid Soft Soft





 Brown Brown Brown











Active Medications: 


Current Medications





Acetaminophen (Tylenol 650mg Supp)  650 mg RC Q8H PRN


   PRN Reason: Pain or Fever >101


   Stop: 10/20/17 06:14


Acetaminophen (Tylenol)  650 mg GT Q4HR PRN


   PRN Reason: Pain or Fever >101


   Stop: 10/21/17 09:31


Alendronate Sodium (Fosamax)  70 mg PO QSAT [facility]


   Stop: 10/25/17 06:29


Ascorbic Acid (Vitamin C)  500 mg GT DAILY [facility]


   Stop: 10/21/17 09:44


   Last Admin: 08/24/17 09:58 Dose:  500 mg


Bisacodyl (Dulcolax 10 Mg Supp)  10 mg RC Q96H PRN


   PRN Reason: Constipation


   Stop: 10/21/17 09:31


   Last Admin: 08/23/17 18:33 Dose:  10 mg


Calcium Carbonate (Os-Jasvir)  500 mg GT DAILY [facility]


   Stop: 10/21/17 09:44


   Last Admin: 08/24/17 09:58 Dose:  500 mg


Docusate Sodium (Colace)  200 mg GT Q12H [facility]


   Stop: 10/21/17 09:44


   Last Admin: 08/24/17 09:58 Dose:  200 mg


Dextrose/Sodium Chloride (D5-0.45ns)  1,000 mls @ 75 mls/hr IV .[id number] [facility]


   Stop: 10/20/17 06:10


   Last Admin: 08/23/17 23:30 Dose:  75 mls/hr


Magnesium Hydroxide (Milk Of Magnesia)  30 ml PO HS PRN


   PRN Reason: Constipation


   Stop: 10/23/17 09:05


Pantoprazole Sodium (Protonix)  40 mg IVP DAILY [facility]


   Stop: 10/20/17 08:59


   Last Admin: 08/24/17 09:58 Dose:  40 mg


Pantoprazole Sodium (Protonix)  40 mg GT QDAC [facility]


   Stop: 10/22/17 07:29


   Last Admin: 08/23/17 06:59 Dose:  40 mg


Polyethylene Glycol (Miralax)  17 gm GT BID [facility]


   Stop: 10/21/17 09:44


   Last Admin: 08/24/17 09:58 Dose:  17 gm


Sodium Phosphate (Fleet Enema)  135 ml RC DAILY PRN


   PRN Reason: Constipation if MOM ineffectiv


   Stop: 10/23/17 09:03


Vitamin D (Vitamin D)  400 iu GT DAILY [facility]


   Stop: 10/21/17 09:44


   Last Admin: 08/24/17 09:58 Dose:  400 iu








General: Alert


HEENT: Atraumatic, EOMI, Mucous membr. moist/pink


Neck: Supple, Other (mo LAD)


Cardiovascular: Normal S1, Normal S2


Lungs: Clear to auscultation


Abdomen: Soft, Other (non tender, non distended)





- Procedures


Procedures: 


 Procedures











Procedure Code Date


 


CHANGE FEEDING DEVICE IN UP INTEST TRACT, EXTERN APPROACH [id number] 08/21/17


 


CHANGE GASTROSTOMY TUBE 45997 08/21/17


 


DIAGNOSTIC COLONOSCOPY 25050 01/12/17


 


EGD BIOPSY SINGLE/MULTIPLE 56468 07/10/14


 


ESOPHAGOGASTRODUODENOSCOPY [EGD] W/CLOSED BIOPSY 45.16 07/10/14


 


INFLUENZA VACCINATION 99.52 01/30/14


 


INSERT INDWELLING CATH 57.94 12/03/12


 


INSERT INTESTINAL TUBE 96.08 12/03/12


 


INSERT PICC CATH [id number] 12/03/12


 


INSERT TEMP BLADDER CATH 18182 12/03/12


 


INSPECTION OF LOWER INTESTINAL TRACT, ENDO [id number] 01/12/17


 


OPEN AND OTHER CECECTOMY 45.72 12/03/12


 


OPEN AND OTHER RIGHT HEMICOLECTOMY 45.73 12/03/12


 


PARTIAL REMOVAL OF COLON 61021 12/03/12


 


UNLISTED PX SMALL INTESTINE 27674 12/03/12


 


VENOUS CATHETERIZATION NEC 38.93 12/03/12














Assessment/Plan





- Problem List


Patient Problems: 


All Active Problems





CONSTIPATION WITH POOR INTAKE (Acute) 


coffee ground vomitus (Acute) 











- Assessment


Assessment: 


# PEG tube malfunction





- s/p G tube change on 8/21, functioning well at this time


- No evidence of GI bleeding when flushing the G tube. Drop in hgb is likely 

dilutional given all cell lines dropped





# Constipation





- Now s/p golytely bowel prep on 8/22 with multiple BMs. Improved. Would 

maintain pt on strong bowel regimen on discharge





Thank you for allowing me to participate in the care of this patient.

## 2017-11-25 ENCOUNTER — HOSPITAL ENCOUNTER (EMERGENCY)
Dept: HOSPITAL 36 - ER | Age: 32
LOS: 1 days | Discharge: NURSING, REHAB OR LONG-TERM CARE | End: 2017-11-26
Payer: MEDICAID

## 2017-11-25 DIAGNOSIS — K94.23: Primary | ICD-10-CM

## 2017-11-25 DIAGNOSIS — F72: ICD-10-CM

## 2017-11-25 DIAGNOSIS — Z43.1: ICD-10-CM

## 2017-11-25 PROCEDURE — Z7502: HCPCS

## 2017-11-25 PROCEDURE — Z7610: HCPCS

## 2017-11-25 NOTE — ED PHYSICIAN CHART
ED Chief Complaint/HPI





- Patient Information


Date Seen:: 11/25/17


Time Seen:: 22:26


Chief Complaint:: DISPLACED G-TUBE AT 8:55 PM.


History of Present Illness:: 





PT WITH SEVERE MENTAL DISABILITY AND UNABLE TO PROVIDE ANY INFORMATION.  IS 

SMILING WITH A WANDERING GAZE.  NO ACUTE DISTRESS.  HAS CEREBRAL PALSY 

associated quadriplegia.


Allergies:: 


 Allergies











Allergy/AdvReac Type Severity Reaction Status Date / Time


 


No Known Allergies Allergy   Verified 01/12/17 23:32











Historian:: EMS


Review:: Nurse's Note Reviewed (nurses triage notes/hard copy reviewed.)





ED Review of Systems





- Review of Systems


General/Constitutional: No fever, No chills, Other (history of no fever or 

chills obtained from nursing facility.  Patient unable to provide any further 

ROS)





Family Medical History





- Family Member


  ** Mother


History Unknown: Yes


Ethnicity: Non-


Living Status: Still Living





ED Physical Exam





- Physical Examination


General/Constitutional: Awake, Well-developed, well-nourished, Alert, No 

distress, Non-toxic appearing


Head: Atraumatic


Eyes: Lids, conjuctiva normal, PERRL, EOMI


Other Eyes comments:: 





Patient with marked horizontal nystagmus with rightward gaze.


Skin: Nl inspection, No rash, No skin lesions, No ecchymosis, Well hydrated


ENMT: External ears, nose nl


Other ENMT comments:: 





Patient is noncooperative with oral examination and keeps his mouth clenched 

closed.


Neck: Nontender, No JVD, No bruit, No stridor


Respiratory: Nl effort/Exclusion, Clear to Auscultation, No Wheeze/Rhonchi/Rales


Cardio Vascular: RRR, No murmur, gallop, rubs, NL S1 S2


Other Cardio Vascular comments:: 





Adequate pulses all 4 extremities.


GI: No tenderness/rebounding/guarding, No organomegaly, No hernia, Normal BS's, 

Nondistended, No mass/bruits, No McBurney tenderness


Other GI comments:: 





Well-healed vertical surgical scar in the midline.  G-tube stoma with De La Fuente 

catheter in the left upper quadrant.


: No CVA tenderness


Extremities: No tenderness or effusion, No edema


Other Extremities comments:: 





Patient with contractures in both hands and feet.  Minimal movement in 

extremities to light touch.


Other Neuro/Psych comments:: 





Alert with wandering gaze.  Patient is completely nonverbal.  His not follow 

simple commands such as open your mouth or look at my light.


Misc: No paraspinal tenderness





ED Labs/Radiology/EKG Results





- Lab Results


Results: 





Contrast material was injected into the replaced G-tube and confirmed 

intraluminal positioning of the tube.





ED Assessment





- Assessment


General Assessment: 





CASE SUMMARY:  This 32-year-old male with severe mental impairment from 

cerebral palsy was brought to the emergency department from his nursing 

facility [facility] cause of displacement of the G-tube which was noted just before 9 

PM.  On physical examination the patient was in no acute distress and there was 

a De La Fuente catheter protruding from the G-tube stoma in the left upper quadrant.  

A De La Fuente catheter was removed and a size 18 G-tube was replaced without 

difficulty.  Radiographic confirmation of tube placement is pending at this 

time.





ED Septic Shock





- .


Is Septic Shock (SBP<90, OR Lactate>4 mmol\L) present?: No





ED Reassessment (Disposition)





- Reassessment


Reassessment Condition:: Improved





- Diagnosis


Diagnosis:: 





DISPLACED GASTROSTOMY TUBE.  SEVERE MENTAL DISABILITY





RETURN TO THE ER ANY FURTHER PROLEMS.





- Patient Disposition


Discharge/Transfer:: Long Term Care - SNF

## 2017-11-26 NOTE — DIAGNOSTIC IMAGING REPORT
Upper GI with Gastrografin



HISTORY: G-tube confirmation



COMPARISON: Upper GI examination on 8/21/2017



FINDINGS:  view demonstrates diffuse distended loops of bowel with

copious amount of stool in distal fecal impaction. A percutaneous

feeding tube is noted. Spinal scoliosis is noted



The second image demonstrates contrast opacification of the stomach and

small bowel loops.



IMPRESSION:



Intraluminal confirmation of patient's percutaneous gastric feeding

tube.

## 2019-01-25 ENCOUNTER — HOSPITAL ENCOUNTER (INPATIENT)
Dept: HOSPITAL 36 - ER | Age: 34
LOS: 3 days | Discharge: NURSING, REHAB OR LONG-TERM CARE | DRG: 247 | End: 2019-01-28
Attending: INTERNAL MEDICINE | Admitting: INTERNAL MEDICINE
Payer: MEDICAID

## 2019-01-25 DIAGNOSIS — K59.39: ICD-10-CM

## 2019-01-25 DIAGNOSIS — K56.41: Primary | ICD-10-CM

## 2019-01-25 DIAGNOSIS — E86.0: ICD-10-CM

## 2019-01-25 DIAGNOSIS — M19.90: ICD-10-CM

## 2019-01-25 DIAGNOSIS — D72.829: ICD-10-CM

## 2019-01-25 DIAGNOSIS — M81.0: ICD-10-CM

## 2019-01-25 DIAGNOSIS — N28.9: ICD-10-CM

## 2019-01-25 DIAGNOSIS — K21.9: ICD-10-CM

## 2019-01-25 DIAGNOSIS — F73: ICD-10-CM

## 2019-01-25 DIAGNOSIS — G80.9: ICD-10-CM

## 2019-01-25 DIAGNOSIS — Z93.1: ICD-10-CM

## 2019-01-25 DIAGNOSIS — Z74.01: ICD-10-CM

## 2019-01-25 DIAGNOSIS — F03.90: ICD-10-CM

## 2019-01-25 DIAGNOSIS — R53.2: ICD-10-CM

## 2019-01-25 LAB
ALBUMIN SERPL-MCNC: 5 GM/DL (ref 4.2–5.5)
ALBUMIN/GLOB SERPL: 1.3 {RATIO} (ref 1–1.8)
ALP SERPL-CCNC: 69 U/L (ref 34–104)
ALT SERPL-CCNC: 14 U/L (ref 7–52)
ANION GAP SERPL CALC-SCNC: 20.4 MMOL/L (ref 7–16)
AST SERPL-CCNC: 17 U/L (ref 13–39)
BASOPHILS NFR BLD: 0 % (ref 0–3)
BILIRUB SERPL-MCNC: 0.5 MG/DL (ref 0.3–1)
BUN SERPL-MCNC: 28 MG/DL (ref 7–25)
CALCIUM SERPL-MCNC: 10.7 MG/DL (ref 8.6–10.3)
CHLORIDE SERPL-SCNC: 95 MEQ/L (ref 98–107)
CO2 SERPL-SCNC: 24.8 MEQ/L (ref 21–31)
CREAT SERPL-MCNC: 0.8 MG/DL (ref 0.7–1.3)
EOSINOPHIL NFR BLD: 0 % (ref 0–5)
ERYTHROCYTE [DISTWIDTH] IN BLOOD BY AUTOMATED COUNT: 12.2 % (ref 11.5–20)
GLOBULIN SER-MCNC: 3.9 GM/DL
GLUCOSE SERPL-MCNC: 133 MG/DL (ref 70–105)
HCT VFR BLD CALC: 54.9 % (ref 41–60)
HGB BLD-MCNC: 17.9 GM/DL (ref 12–16)
INR PPP: 1.06 (ref 0.5–1.4)
LYMPHOCYTES # BLD MANUAL: 6 % (ref 20–50)
MCH RBC QN AUTO: 29.7 PG (ref 26–30)
MCHC RBC AUTO-ENTMCNC: 32.6 PG (ref 28–36)
MCV RBC AUTO: 91.2 FL (ref 80–99)
MONOCYTES # BLD MANUAL: 2 % (ref 2–10)
NEUTROPHILS NFR BLD AUTO: 92 % (ref 40–80)
NEUTS BAND NFR BLD: 0 % (ref 0–10)
PLATELET # BLD: 253 TH/CMM (ref 150–400)
PMV BLD AUTO: 7.4 FL
POTASSIUM SERPL-SCNC: 4.2 MEQ/L (ref 3.5–5.1)
PROTHROMBIN TIME: 11 SECONDS (ref 9.5–11.5)
RBC # BLD AUTO: 6.02 MIL/CMM (ref 4.3–5.7)
SODIUM SERPL-SCNC: 136 MEQ/L (ref 136–145)
WBC # BLD AUTO: 11.6 TH/CMM (ref 4.8–10.8)

## 2019-01-25 PROCEDURE — Z7610: HCPCS

## 2019-01-25 PROCEDURE — C9113 INJ PANTOPRAZOLE SODIUM, VIA: HCPCS

## 2019-01-25 RX ADMIN — DEXTROSE AND SODIUM CHLORIDE SCH MLS/HR: 5; .45 INJECTION, SOLUTION INTRAVENOUS at 15:46

## 2019-01-25 NOTE — ED PHYSICIAN CHART
ED Chief Complaint/HPI





- Patient Information


Date Seen:: 01/25/19


Time Seen:: 12:25


Chief Complaint:: vomiting


History of Present Illness:: 





this is a chronically cp patient from the nursing home for an evaluation of his 

vomiting once this am.


Allergies:: 


 Allergies











Allergy/AdvReac Type Severity Reaction Status Date / Time


 


No Known Allergies Allergy   Verified 01/12/17 23:32











Vitals:: 


 Vital Signs - 8 hr











  01/25/19





  12:18


 


Temp 100.3 F


 


HR 87


 


RR 19


 


/90


 


O2 Sat % 96











Historian:: Medical Records


Review:: Nurse's Note Reviewed, Transfer documents Reviewed





ED Review of Systems





- Review of Systems


General/Constitutional: No fever, No chills, No weight loss, No weakness, No 

diaphoresis, No edema, No loss of appetite, Other (the patient is unable to 

give a review of systems)


Skin: No skin lesions, No rash, No bruising


Head: No headache, No light-headedness


Eyes: No loss of vision, No pain, No diplopia


ENT: No earache, No nasal drainage, No sore throat, No tinnitus


Neck: No neck pain, No swelling, No thyromegaly, No stiffness, No mass noted


Cardio Vascular: No chest pain, No palpitations, No PND, No orthopnea, No edema


Pulmonary: No SOB, No cough, No sputum, No wheezing


GI: No nausea, No vomiting, No diarrhea, No pain, No melena, No hematochezia, 

No constipation, No hematemesis


G/U: No dysuria, No frequency, No hematuria


Musculoskeletal: No bone or joint pain, No back pain, No muscle pain


Endocrine: No polyuria, No polydipsia


Psychiatric: No prior psych history, No depression, No anxiety, No suicidal 

ideation


Hematopoietic: No bruising, No lymphadenopathy


Allergic/Immuno: No urticaria, No angioedema


Neurological: No syncope, No focal symptoms, No weakness, No paresthesia, No 

headache, No seizure, No dizziness, No confusion, No vertigo





ED Past Medical History





- Past Medical History


Obtainable: Yes


Past Medical History: Dementia, Other (cp that is quadraplegic)





Family Medical History





- Family Member


  ** Mother


History Unknown: Yes


Ethnicity: Non-


Living Status: Still Living





ED Physical Exam





- Physical Examination


General/Constitutional: Awake, Well-developed, well-nourished, Alert, No 

distress, GCS 15, Non-toxic appearing, Ambulatory


Other Gen/Cons comments:: 





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


Head: Atraumatic


Eyes: Lids, conjuctiva normal, PERRL, EOMI


Skin: Nl inspection, No rash, No skin lesions, No ecchymosis, Well hydrated, No 

lymphadenopathy


ENMT: External ears, nose nl, Nasal exam nl, Lips, teeth, gums nl


Neck: Nontender, Full ROM w/o pain, No JVD, No nuchal rigidity, No bruit, No 

mass, No stridor


Respiratory: Nl effort/Exclusion, Clear to Auscultation, No Wheeze/Rhonchi/Rales


Cardio Vascular: RRR, No murmur, gallop, rubs, NL S1 S2


GI: No tenderness/rebounding/guarding, No organomegaly, No hernia, Normal BS's, 

Nondistended, No mass/bruits, No McBurney tenderness


Other GI comments:: 





g-tube noted and functioning ok


: No CVA tenderness


Extremities: No tenderness or effusion, Full ROM, normal strength in all 

extremities, No edema, Normal digits & nails


Neuro/Psych: Alert/oriented, DTR's symmetric, Normal sensory exam, Normal motor 

strength, Judgement/insight normal, Mood normal, Normal gait, No focal deficits


Misc: Normal back, No paraspinal tenderness





ED Assessment





- Assessment


General Assessment: 





vomiting





ED Septic Shock





- .


Is Septic Shock (SBP<90, OR Lactate>4 mmol\L) present?: No





- <6hrs of presentation:


Vital Signs: 


 Vital Signs - 8 hr











  01/25/19





  12:18


 


Temp 100.3 F


 


HR 87


 


RR 19


 


/90


 


O2 Sat % 96














ED Reassessment (Disposition)





- Patient Disposition


Discharge/Transfer:: Acute Care w/in this hosp


Admitting Medical Physician:: Darien Matos

## 2019-01-25 NOTE — DIAGNOSTIC IMAGING REPORT
CT scan abdomen and pelvis with intravenous contrast



HISTORY: Vomiting



Total DLP equals 481



CTDI equals 8.8



Following administration of intravenous contrast, axial sections were

obtained from the xiphoid process down to the pubic symphysis.



Limited sections through the lower chest demonstrated fluid filled

dilated esophagus.



There is a markedly dilated and fluid-filled stomach.  Gastrostomy tube

noted.  Multiple loops of fluid-filled dilated small bowel are seen. 

Dilated fluid-filled colon is also noted.  There is a severely distended

stool-filled air-filled rectum and sigmoid colon.  Findings suggest

obstruction.  Clinical correlation is needed.



The liver exhibits a homogeneous parenchyma.  No focal lesions.  The

spleen appears normal.  No focal abnormality seen within the pancreas. 

No focal renal lesions.  No hydronephrosis.



No other abnormal masses or fluid collections seen within the pelvis.



IMPRESSION:

1.  Markedly predominantly fluid-filled dilated distal esophagus,

stomach, small bowel, and colon.  Changes are associated with a markedly

dilated air and stool-filled rectum and sigmoid colon.  Changes

consistent with obstruction.  Clinical correlation is needed.

2.  Gastrostomy tube

## 2019-01-26 LAB
ALBUMIN SERPL-MCNC: 3.9 GM/DL (ref 4.2–5.5)
ALBUMIN/GLOB SERPL: 1.3 {RATIO} (ref 1–1.8)
ALP SERPL-CCNC: 46 U/L (ref 34–104)
ALT SERPL-CCNC: 12 U/L (ref 7–52)
ANION GAP SERPL CALC-SCNC: 11.3 MMOL/L (ref 7–16)
AST SERPL-CCNC: 16 U/L (ref 13–39)
BASOPHILS # BLD AUTO: 0 TH/CUMM (ref 0–0.2)
BASOPHILS NFR BLD AUTO: 0.4 % (ref 0–2)
BILIRUB SERPL-MCNC: 0.5 MG/DL (ref 0.3–1)
BUN SERPL-MCNC: 30 MG/DL (ref 7–25)
CALCIUM SERPL-MCNC: 8.9 MG/DL (ref 8.6–10.3)
CHLORIDE SERPL-SCNC: 104 MEQ/L (ref 98–107)
CO2 SERPL-SCNC: 30.2 MEQ/L (ref 21–31)
CREAT SERPL-MCNC: 0.7 MG/DL (ref 0.7–1.3)
EOSINOPHIL # BLD AUTO: 0.2 TH/CMM (ref 0.1–0.4)
EOSINOPHIL NFR BLD AUTO: 2.5 % (ref 0–5)
ERYTHROCYTE [DISTWIDTH] IN BLOOD BY AUTOMATED COUNT: 12.1 % (ref 11.5–20)
GLOBULIN SER-MCNC: 2.9 GM/DL
GLUCOSE SERPL-MCNC: 113 MG/DL (ref 70–105)
HCT VFR BLD CALC: 45.1 % (ref 41–60)
HGB BLD-MCNC: 14.8 GM/DL (ref 12–16)
INR PPP: 1.11 (ref 0.5–1.4)
LYMPHOCYTE AB SER FC-ACNC: 2.5 TH/CMM (ref 1.5–3)
LYMPHOCYTES NFR BLD AUTO: 32.9 % (ref 20–50)
MAGNESIUM SERPL-MCNC: 2.6 MG/DL (ref 1.9–2.7)
MCH RBC QN AUTO: 29.8 PG (ref 26–30)
MCHC RBC AUTO-ENTMCNC: 32.8 PG (ref 28–36)
MCV RBC AUTO: 90.7 FL (ref 80–99)
MONOCYTES # BLD AUTO: 0.5 TH/CMM (ref 0.3–1)
MONOCYTES NFR BLD AUTO: 7.2 % (ref 2–10)
NEUTROPHILS # BLD: 4.4 TH/CMM (ref 1.8–8)
NEUTROPHILS NFR BLD AUTO: 57 % (ref 40–80)
PLATELET # BLD: 194 TH/CMM (ref 150–400)
PMV BLD AUTO: 6.7 FL
POTASSIUM SERPL-SCNC: 4.5 MEQ/L (ref 3.5–5.1)
PROTHROMBIN TIME: 11.4 SECONDS (ref 9.5–11.5)
RBC # BLD AUTO: 4.98 MIL/CMM (ref 4.3–5.7)
SODIUM SERPL-SCNC: 141 MEQ/L (ref 136–145)
WBC # BLD AUTO: 7.6 TH/CMM (ref 4.8–10.8)

## 2019-01-26 RX ADMIN — METOCLOPRAMIDE SCH MG: 5 INJECTION, SOLUTION INTRAMUSCULAR; INTRAVENOUS at 20:32

## 2019-01-26 RX ADMIN — DOCUSATE SODIUM SCH: 50 LIQUID ORAL at 09:35

## 2019-01-26 RX ADMIN — DEXTROSE AND SODIUM CHLORIDE SCH MLS/HR: 5; .45 INJECTION, SOLUTION INTRAVENOUS at 04:27

## 2019-01-26 RX ADMIN — POLYETHYLENE GLYCOL 3350 SCH: 17 POWDER, FOR SOLUTION ORAL at 08:12

## 2019-01-26 RX ADMIN — POLYETHYLENE GLYCOL 3350 SCH: 17 POWDER, FOR SOLUTION ORAL at 17:43

## 2019-01-26 RX ADMIN — Medication SCH: at 08:12

## 2019-01-26 RX ADMIN — DOCUSATE SODIUM SCH: 50 LIQUID ORAL at 20:32

## 2019-01-26 NOTE — DIAGNOSTIC IMAGING REPORT
Small bowel follow-through



HISTORY: Abdominal distention, question obstruction



The preliminary  radiograph demonstrates contrast filled distorted

shape urinary bladder displaced to the right side of the pelvis.  This

corresponds to findings noted on a prior CT scan of 1/25/2019.  Changes

related to a severely distended stool-filled rectum.



Water-soluble contrast administered orally.



There was free flow of contrast from the stomach into the duodenum. 

Mild to moderately dilated loops of small bowel are seen.  Slight

delayed transit in the passage of contrast to the colon.  However, no

focal point of obstruction is seen.



IMPRESSION:

1.  Mildly dilated small bowel along with a mild delay in small bowel

transit time.  No focal point of obstruction identified.

2.  Severely distended stool-filled rectum with displacement of the

urinary bladder to the right side.  Findings consistent with a fecal

impaction.

## 2019-01-26 NOTE — HISTORY & PHYSICAL
ADMIT DATE:     01/26/2019



CHIEF COMPLAINT:  Vomiting.



HISTORY OF PRESENT ILLNESS:  This is a 33-year-old disabled gentleman with

profound intellectual disability, cerebral palsy, history of dysphagia with a

PEG in place, history of GERD, who presented to the ED with 1-day history of

multiple emesis.  Pertinent findings at the ED include a CT of the abdomen and

pelvis showing;

1.  Markedly predominantly fluid filled dilated distal esophagus, stomach, small

bowel and colon. Changes are associated with a markedly distended air and stool

filled rectum and sigmoid colon.  These changes are consistent with obstruction.

2.  Gastrostomy tube is in place.



The patient was last admitted to this facility on 08/21/2017 for coffee-ground

emesis/GI bleed and a malfunctioning gastrostomy tube.  At that time, he

underwent an EGD with PEG placement and apparently, the patient has been stable

since then until yesterday given the above-mentioned findings.



PAST MEDICAL HISTORY:  Profound intellectual disability, cerebral palsy, acid

reflux disease, history of osteoarthritis, osteoporosis, and constipation.



PAST SURGICAL HISTORY:  Include PEG placement.  There is also evidence of back

surgeries, although they are not mentioned on his medical records.



FAMILY HISTORY:  Likely noncontributory to this admission.



SOCIAL HISTORY:  No tobacco.  No ETOH or illicit drug usage.



ALLERGIES:  NKDA.



OUTPATIENT MEDICATIONS:  Tylenol 650 q. 4 p.r.n. for pain, Fosamax 70 mg q.

Saturday, vitamin C 500 mg daily, Dulcolax suppository 10 mg q. 96 hours for

constipation, calcium carbonate 500 mg every day, Colace 200 mg q.12 hours,

Nexium 40 mg every day, Fleet enema 135 mL q. 96 hours p.r.n. for severe

constipation, magnesium hydroxide 30 mL q. 72 hours, meloxicam 7.5 b.i.d.,

multivitamins and minerals daily, nitroglycerin p.r.n. 0.4 for chest pain,

MiraLax 17 g b.i.d., vitamin D 400 international units every day.



REVIEW OF SYSTEMS:  Unable to be done given patient's condition.



PHYSICAL EXAMINATION:

VITAL SIGNS:  Temperature 97.4, pulse 70, respirations 18, /70 with sats

of 93-94% on room air.

GENERAL:  He is a well-nourished, developmentally delayed male, currently awake,

appears to be in no distress.

HEAD AND NECK:  Pupils are equal and reactive to light.  Extraocular movements

appear to be intact.  Oropharynx moist and clear.

CARDIOVASCULAR:  Regular rate and rhythm without any murmurs.

LUNGS:  Clear to auscultation bilaterally.

ABDOMEN:  Somewhat distended.  Currently, nontender with hypoactive bowel

sounds.  There are no peritoneal signs.  No rebound noted.

EXTREMITIES:  Lower extremities, there is no edema.  Both upper and lower

extremities are contracted.



LABORATORY DATA:  On admission, white count 11.6, H and H 17/54, and platelet

count of 253.  Chem-7 showed a BUN of 28 with glucose of 133, otherwise within

normal limits.  Alkaline phosphatase 69, AST 17, ALT 14 with an albumin level of

5.0.



DIAGNOSTICS:  Please refer to HPI.



IMPRESSION:

1.  Small-bowel obstruction (Ddx 2ry to fecal impaction/ileus).

2.  Mild leukocytosis

3.  Mild renal insufficiency/azotemia.

4.  History of dysphagia, status post PEG placement.

5.  History of cerebral palsy/mental disability.

6.  History of acid reflux disease.

7.  History of osteoarthritis.

8.  History of osteoporosis.



PLAN:  The patient has been admitted to the medical/surgical floor where he has

been placed on IV fluids, hydration and empiric IV antibiotics.  He also has

been placed on Protonix IV.  GI as well as a surgical consult will be placed for

further management and care.





DD: 01/26/2019 12:18

DT: 01/26/2019 14:46

JOB# [id number]  [id number]

MTDD

## 2019-01-26 NOTE — CONSULTATION
DATE OF CONSULTATION:  01/26/2019



REQUESTING PHYSICIAN:  Dr. Reza Askew.



REASON FOR CONSULTATION:  Megacolon, megaesophagus, megastomach.



Thank you for asking me to see this patient in consultation.



HISTORY OF PRESENT ILLNESS:  This is a 33-year-old male with cerebral palsy as

well as mostly chronic bedbound state as well as G-tube dependent, who presents

to the hospital with 1-day history of emesis.  The patient had a CT scan of the

abdomen and pelvis, which showed a predominantly fluid filled distal esophagus,

stomach, small bowel and colon, which was markedly distended with air.  There

was prominent amount of stool within the distal colon.  The patient since

arrival has been able to start passing more gas and had a large BM before my

evaluation.



PAST MEDICAL HISTORY:  Profound intellectual disability and cerebral palsy.



PAST SURGICAL HISTORY:  Including a PEG placement.



FAMILY HISTORY:  Noncontributory for GI disease.



SOCIAL HISTORY:  Denies any tobacco, alcohol, or drugs.



MEDICATIONS:  Have been reviewed.



PHYSICAL EXAMINATION:

VITAL SIGNS:  Temperature 97.4, pulse 70, respirations 18, blood pressure 102/70

with saturating 94% on room air.

GENERAL:  He is in no acute distress.  He appears smiling and jovial.

HEENT:  Normocephalic, atraumatic.  PERRLA positive.

LUNGS:  Clear bilaterally.  No wheezes, rales, or rhonchi.

ABDOMEN:  Soft, slightly distended, hypertympanic to percussion.

EXTREMITIES:  Show no lower extremity edema.

PSYCHOLOGICAL:  Could not assess.



LABORATORY DATA:  White count of 11,000, platelets of 253,000, AST of 17, ALT of

14, albumin of 5.0.  BUN 28, creatinine 0.8.  Hemoglobin of 17.9, currently

14.8.



IMAGING:  CT of the abdomen and pelvis on personal review shows very large

distended colon and small bowel loops as well with distal fecal impaction.



ASSESSMENT AND PLAN:  This is a 33-year-old male with intellectual disability,

cerebral palsy, G-tube dependent and chronically bedbound, who presents with

increased emesis and abdominal distention with findings as above on CT scan.

1.  Severe abdominal organ distention.

2.  Distal rectal fecal impaction causing sequelae.

3.  Severe dehydration likely contributing as well to #1.

4.  Severe constipation.

5.  Megastomach, megaesophagus, megacolon.



I believe that the constellation of his findings and presentation are likely

related to severe distal rectal impaction with stool.  We would recommend

aggressive care with both tap water enema and mineral oil enema if patient does

not have a bowel movement.  Since he has already had a bowel movement, we can

just use one of these enemas and reserve the other one if the x-ray that we

checked tomorrow shows residual distal fecal impaction.  The patient likely will

be able to pass more air after having a large bowel movement; however, if he

does not do so an NG tube may be of further assistance.  If he indeed has a

classic McNeil syndrome that occurs even after removal of the fecal impaction

and we can consider some other medication options; however, for now, we will

recommend conservative management.  I will also start Reglan 5 mg IV q. 8 hours

for now.



Thank you for allowing us to participate in this patient's care.





DD: 01/26/2019 16:39

DT: 01/26/2019 17:18

JOB# [id number]  [id number]

## 2019-01-26 NOTE — CONSULTATION
DATE OF CONSULTATION:  01/26/2019



TIME OF CONSULTATION 08:18 a.m.



HISTORY OF PRESENT ILLNESS:  The patient is a 33-year-old male with cerebral

palsy.  He is mentally handicapped, nonverbal, presented to the hospital for

vomiting.  He denies any significant abdominal pain.  Denies any significant

distress at this time.  Not much history is known about him.



ALLERGIES:  He has no known drug allergies.



PHYSICAL EXAMINATION:

GENERAL:  He is afebrile.

VITAL SIGNS:  Otherwise stable.  He is 48 kilograms.  BMI of 17.1.

HEAD AND NECK:  Within normal limits.

CHEST:  Clear to auscultation bilaterally.

CARDIOVASCULAR:  Regular rate.

ABDOMEN:  Somewhat distended.  He has got a G-tube in place.  He has a midline

abdominal incision which is well healed.  There is no obvious ventral hernias

noted.

NEUROVASCULAR:  Otherwise normal.

RECTAL:  Digital rectal examination was performed.  There is no anorectal mass,

is a dilated rectum.  He has stool within the rectal vault, but not constipated

or no fecal impaction.



LABORATORY DATA:  His white blood cell count 11.6, H and H is 17.9 and 54.9,

platelet count 253.  Coags are normal.  Chloride 95, BUN 28, calcium 10.7. 

Troponin less than 0.01.



RADIOGRAPHIC STUDIES:  The CT abdomen and pelvis reviewed, the results noted,

shows marked predominantly fluid filled dilated distal esophagus, stomach, small

bowel and colon.  Changes are associated with a markedly dilated air and stool

filled rectum and sigmoid colon, changes consistent with obstruction versus

severe ileus and patient has a gastrostomy tube as well too.



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  I would recommend GI either do a flexible

sigmoidoscopy versus a colonoscopy to evaluate his colon.  If that is clear and

the colon is decompressed, we would recommend a small bowel series to evaluate

the small bowel.  He is afebrile.  He does have a mild leukocytosis, but he has

a fairly benign abdominal examination.  I do not think that he is a surgical

candidate at this point or there is a need for surgery as this may be an

paralytic ileus process.  Vale syndrome should be considered in the

differential, rule out obstructive lesion, although dilation of the colon goes

all the way down to the sigmoid and rectum and digital rectal examination

reveals no obvious mass or fecal impaction.  We will follow closely.  Would keep

the patient n.p.o. until GI evaluates the patient.





DD: 01/26/2019 08:21

DT: 01/26/2019 19:01

JOB# [id number]  [id number]

## 2019-01-27 LAB
ANION GAP SERPL CALC-SCNC: 14.9 MMOL/L (ref 7–16)
BASOPHILS # BLD AUTO: 0 TH/CUMM (ref 0–0.2)
BASOPHILS NFR BLD AUTO: 0.3 % (ref 0–2)
BUN SERPL-MCNC: 14 MG/DL (ref 7–25)
CALCIUM SERPL-MCNC: 9 MG/DL (ref 8.6–10.3)
CHLORIDE SERPL-SCNC: 105 MEQ/L (ref 98–107)
CO2 SERPL-SCNC: 24.2 MEQ/L (ref 21–31)
CREAT SERPL-MCNC: 0.5 MG/DL (ref 0.7–1.3)
EOSINOPHIL # BLD AUTO: 0.2 TH/CMM (ref 0.1–0.4)
EOSINOPHIL NFR BLD AUTO: 4.2 % (ref 0–5)
ERYTHROCYTE [DISTWIDTH] IN BLOOD BY AUTOMATED COUNT: 12.1 % (ref 11.5–20)
GLUCOSE SERPL-MCNC: 104 MG/DL (ref 70–105)
HCT VFR BLD CALC: 42.7 % (ref 41–60)
HGB BLD-MCNC: 13.7 GM/DL (ref 12–16)
LYMPHOCYTE AB SER FC-ACNC: 2.2 TH/CMM (ref 1.5–3)
LYMPHOCYTES NFR BLD AUTO: 40.5 % (ref 20–50)
MAGNESIUM SERPL-MCNC: 2.3 MG/DL (ref 1.9–2.7)
MCH RBC QN AUTO: 29.2 PG (ref 26–30)
MCHC RBC AUTO-ENTMCNC: 32.2 PG (ref 28–36)
MCV RBC AUTO: 90.8 FL (ref 80–99)
MONOCYTES # BLD AUTO: 0.4 TH/CMM (ref 0.3–1)
MONOCYTES NFR BLD AUTO: 8.1 % (ref 2–10)
NEUTROPHILS # BLD: 2.7 TH/CMM (ref 1.8–8)
NEUTROPHILS NFR BLD AUTO: 46.9 % (ref 40–80)
PLATELET # BLD: 161 TH/CMM (ref 150–400)
PMV BLD AUTO: 7.2 FL
POTASSIUM SERPL-SCNC: 4.1 MEQ/L (ref 3.5–5.1)
RBC # BLD AUTO: 4.7 MIL/CMM (ref 4.3–5.7)
SODIUM SERPL-SCNC: 140 MEQ/L (ref 136–145)
WBC # BLD AUTO: 5.5 TH/CMM (ref 4.8–10.8)

## 2019-01-27 RX ADMIN — METOCLOPRAMIDE SCH MG: 5 INJECTION, SOLUTION INTRAMUSCULAR; INTRAVENOUS at 12:34

## 2019-01-27 RX ADMIN — Medication SCH: at 10:18

## 2019-01-27 RX ADMIN — DEXTROSE AND SODIUM CHLORIDE SCH MLS/HR: 5; .45 INJECTION, SOLUTION INTRAVENOUS at 02:17

## 2019-01-27 RX ADMIN — DOCUSATE SODIUM SCH MG: 50 LIQUID ORAL at 22:05

## 2019-01-27 RX ADMIN — METOCLOPRAMIDE SCH MG: 5 INJECTION, SOLUTION INTRAMUSCULAR; INTRAVENOUS at 22:04

## 2019-01-27 RX ADMIN — DEXTROSE AND SODIUM CHLORIDE SCH MLS/HR: 5; .45 INJECTION, SOLUTION INTRAVENOUS at 22:24

## 2019-01-27 RX ADMIN — POLYETHYLENE GLYCOL 3350 SCH: 17 POWDER, FOR SOLUTION ORAL at 10:18

## 2019-01-27 RX ADMIN — DOCUSATE SODIUM SCH: 50 LIQUID ORAL at 10:17

## 2019-01-27 RX ADMIN — POLYETHYLENE GLYCOL 3350 SCH GM: 17 POWDER, FOR SOLUTION ORAL at 16:37

## 2019-01-27 RX ADMIN — METOCLOPRAMIDE SCH MG: 5 INJECTION, SOLUTION INTRAMUSCULAR; INTRAVENOUS at 04:20

## 2019-01-27 RX ADMIN — DEXTROSE AND SODIUM CHLORIDE SCH MLS/HR: 5; .45 INJECTION, SOLUTION INTRAVENOUS at 12:36

## 2019-01-27 NOTE — DIAGNOSTIC IMAGING REPORT
KUB abdominal film



HISTORY: Abdominal distention



Compared with the prior radiograph associated with a small bowel

follow-through of 1/25/2019, radiopaque contrast remains within mildly

dilated large bowel.  Persistent air-filled loops of mildly dilated

small bowel seen.  Radiopaque contrast remains within the urinary

bladder.  Decreased stool within the rectal region.



IMPRESSION:

1.  Decreased stool within the rectal region

2.  Persistent radiopaque contrast within mildly dilated large bowel

along with persistent loops of mildly dilated small bowel.  Significance

of the findings should be correlated clinically.

## 2019-01-27 NOTE — GI PROGRESS NOTE
Subjective





- Review of Systems


Service Date: 01/27/19


Events since last encounter: 





No events, pt doing well; had a large bowel movement this Am





Objective





- Results


Result Diagrams: 


 01/27/19 06:05





 01/27/19 06:05


Recent Labs: 


 Laboratory Last Values











WBC  5.5 Th/cmm (4.8-10.8)   01/27/19  06:05    


 


RBC  4.70 Mil/cmm (4.30-5.70)   01/27/19  06:05    


 


Hgb  13.7 gm/dL (12-16)   01/27/19  06:05    


 


Hct  42.7 % (41.0-60)   01/27/19  06:05    


 


MCV  90.8 fl (80-99)   01/27/19  06:05    


 


MCH  29.2 pg (26.0-30.0)   01/27/19  06:05    


 


MCHC Differential  32.2 pg (28.0-36.0)   01/27/19  06:05    


 


RDW  12.1 % (11.5-20.0)   01/27/19  06:05    


 


Plt Count  161 Th/cmm (150-400)   01/27/19  06:05    


 


MPV  7.2 fl  01/27/19  06:05    


 


Add Manual Diff  YES   01/25/19  12:05    


 


Neutrophils %  46.9 % (40.0-80.0)   01/27/19  06:05    


 


Band Neutrophils %  0 % (0-10)   01/25/19  12:05    


 


Lymphocytes %  40.5 % (20.0-50.0)   01/27/19  06:05    


 


Monocytes %  8.1 % (2.0-10.0)   01/27/19  06:05    


 


Eosinophils %  4.2 % (0.0-5.0)   01/27/19  06:05    


 


Basophils %  0.3 % (0.0-2.0)   01/27/19  06:05    


 


Neutrophils (Manual)  92 % (40-80)  H  01/25/19  12:05    


 


Lymphocytes  6 % (20-50)  L  01/25/19  12:05    


 


Monocytes  2 % (2-10)   01/25/19  12:05    


 


Eosinophils  0 % (0-5)   01/25/19  12:05    


 


Basophils  0 % (0-3)   01/25/19  12:05    


 


PT  11.4 SECONDS (9.5-11.5)   01/26/19  08:44    


 


INR  1.11  (0.5-1.4)   01/26/19  08:44    


 


PTT (Actin FS)  26.7 SECONDS (26.0-38.0)   01/25/19  12:05    


 


Sodium  140 mEq/L (136-145)   01/27/19  06:05    


 


Potassium  4.1 mEq/L (3.5-5.1)   01/27/19  06:05    


 


Chloride  105 mEq/L ()   01/27/19  06:05    


 


Carbon Dioxide  24.2 mEq/L (21.0-31.0)   01/27/19  06:05    


 


Anion Gap  14.9  (7.0-16.0)   01/27/19  06:05    


 


BUN  14 mg/dL (7-25)   01/27/19  06:05    


 


Creatinine  0.5 mg/dL (0.7-1.3)  L  01/27/19  06:05    


 


Est GFR ( Amer)  > 60.0 ml/min (>90)   01/27/19  06:05    


 


Est GFR (Non-Af Amer)  > 60.0 ml/min  01/27/19  06:05    


 


BUN/Creatinine Ratio  28.0   01/27/19  06:05    


 


Glucose  104 mg/dL ()   01/27/19  06:05    


 


Calcium  9.0 mg/dL (8.6-10.3)   01/27/19  06:05    


 


Magnesium  2.3 mg/dL (1.9-2.7)   01/27/19  06:05    


 


Total Bilirubin  0.5 mg/dL (0.3-1.0)   01/26/19  08:44    


 


AST  16 U/L (13-39)   01/26/19  08:44    


 


ALT  12 U/L (7-52)   01/26/19  08:44    


 


Alkaline Phosphatase  46 U/L ()   01/26/19  08:44    


 


Troponin I  < 0.01 ng/mL (0.01-0.05)  L  01/25/19  12:05    


 


Total Protein  6.8 gm/dL (6.0-8.3)   01/26/19  08:44    


 


Albumin  3.9 gm/dL (4.2-5.5)  L  01/26/19  08:44    


 


Globulin  2.9 gm/dL  01/26/19  08:44    


 


Albumin/Globulin Ratio  1.3  (1.0-1.8)   01/26/19  08:44    


 


TSH  0.61 uIU/ml (0.34-5.60)   01/25/19  12:40    














- Physical Exam


Vitals and I&O: 


 Vital Signs











Temp  97.9 F   01/27/19 08:09


 


Pulse  77   01/27/19 08:09


 


Resp  18   01/27/19 08:09


 


BP  122/74   01/27/19 08:09


 


Pulse Ox  99   01/27/19 08:09








 Intake & Output











 01/26/19 01/27/19 01/27/19





 18:59 06:59 18:59


 


Intake Total 1050  


 


Balance 1050  


 


Weight (lbs) 48.081 kg 48.534 kg 


 


Intake:   


 


  Intake, IV Amount 1050  


 


    D5-0.45NS 1,000 ml @ 100 1000  





    mls/hr IV .Q10H [facility] Rx#:   





    [id number]   


 


    cefTRIAXone 1 gm In 50  





    Sodium Chloride 0.9% 50   





    ml @ 100 mls/hr IV Q24HR   





    [facility] Rx#:[id number]   


 


Other:   


 


  # Voids 3 3 


 


  # Bowel Movements 2 1 


 


  Weight Source Bedscale Bedscale 











Active Medications: 


Current Medications





Acetaminophen (Tylenol)  650 mg GT Q4HR PRN


   PRN Reason: Pain Or Fever >99.9


   Stop: 03/27/19 00:12


Alendronate Sodium (Fosamax)  70 mg PO QSAT [facility]


   Stop: 03/27/19 00:14


   Last Admin: 01/26/19 00:25 Dose:  Not Given


Ascorbic Acid (Vitamin C)  500 mg GT DAILY [facility]


   Stop: 03/27/19 08:59


   Last Admin: 01/27/19 10:17 Dose:  Not Given


Bisacodyl (Dulcolax 10 Mg Supp)  10 mg RC Q96H PRN


   PRN Reason: MODERATE CONSTIPATION


   Stop: 03/27/19 00:12


Calcium Carbonate (Os-Jasvir)  500 mg GT DAILY [facility]


   Stop: 03/27/19 08:59


   Last Admin: 01/27/19 10:17 Dose:  Not Given


Docusate Sodium (Colace)  200 mg GT Q12HR [facility]


   Stop: 03/27/19 08:59


   Last Admin: 01/27/19 10:17 Dose:  Not Given


Dextrose/Sodium Chloride (D5-0.45ns)  1,000 mls @ 100 mls/hr IV .Q10H [facility]


   Stop: 03/26/19 14:01


   Last Admin: 01/27/19 02:17 Dose:  100 mls/hr


Ceftriaxone Sodium 1 gm/ (Sodium Chloride)  50 mls @ 100 mls/hr IV Q24HR [facility]


   Stop: 03/27/19 14:59


   Last Infusion: 01/26/19 15:15 Dose:  Infused


Magnesium Hydroxide (Milk Of Magnesia)  30 ml GT Q72H PRN


   PRN Reason: Constipation


   Stop: 03/27/19 00:14


Metoclopramide HCl (Reglan)  5 mg IVP Q8HR [facility]


   Stop: 03/27/19 20:59


   Last Admin: 01/27/19 04:20 Dose:  5 mg


Miscellaneous (Meloxicam [Mobic])  7.5 mg GT BID [facility]


   Stop: 03/27/19 08:59


   Last Admin: 01/27/19 10:18 Dose:  Not Given


Nitroglycerin (Nitrostat)  0.4 mg SL Q5MIN PRN


   PRN Reason: CHEST PAIN X3


   Stop: 03/27/19 00:14


Ondansetron HCl (Zofran)  4 mg IV Q4H PRN


   PRN Reason: Nausea / Vomiting


   Stop: 03/26/19 14:14


   Last Admin: 01/26/19 17:36 Dose:  4 mg


Pantoprazole Sodium (Protonix)  40 mg IVP DAILY [facility]


   Stop: 03/27/19 08:59


   Last Admin: 01/27/19 08:49 Dose:  40 mg


Polyethylene Glycol (Miralax)  17 gm GT BID [facility]


   Stop: 03/27/19 08:59


   Last Admin: 01/27/19 10:18 Dose:  Not Given


Sodium Phosphate (Fleet Enema)  135 ml RC Q96H PRN


   PRN Reason: SEVERE CONSTIPATION


   Stop: 03/27/19 00:12


Vitamin D (Vitamin D)  400 iu GT DAILY [facility]


   Stop: 03/27/19 08:59


   Last Admin: 01/27/19 10:18 Dose:  Not Given








General: Alert, Cooperative


HEENT: Atraumatic, PERRLA, EOMI


Neck: Supple


Cardiovascular: Regular rate, Normal S1, Normal S2


Lungs: Clear to auscultation


Abdomen: Bowel sounds, Soft





- Procedures


Procedures: 


 Procedures











Procedure Code Date


 


CHANGE FEEDING DEVICE IN UP INTEST TRACT, EXTERN APPROACH [id number] 08/21/17


 


CHANGE GASTROSTOMY TUBE 61706 08/21/17


 


DIAGNOSTIC COLONOSCOPY 21985 01/12/17


 


EGD BIOPSY SINGLE/MULTIPLE 87201 07/10/14


 


ESOPHAGOGASTRODUODENOSCOPY [EGD] W/CLOSED BIOPSY 45.16 07/10/14


 


INFLUENZA VACCINATION 99.52 01/30/14


 


INSERT INDWELLING CATH 57.94 12/03/12


 


INSERT INTESTINAL TUBE 96.08 12/03/12


 


INSERT TEMP BLADDER CATH 39579 12/03/12


 


INSJ PICC 5 YR+ W/O IMAGING 45852 12/03/12


 


INSPECTION OF LOWER INTESTINAL TRACT, ENDO [id number] 01/12/17


 


OPEN AND OTHER CECECTOMY 45.72 12/03/12


 


OPEN AND OTHER RIGHT HEMICOLECTOMY 45.73 12/03/12


 


PARTIAL REMOVAL OF COLON 21686 12/03/12


 


UNLISTED PX SMALL INTESTINE 16610 12/03/12


 


VENOUS CATHETERIZATION NEC 38.93 12/03/12














Assessment/Plan





- Assessment


Assessment: 





1. Distal fecal impaction


2. Large bowel and small bowel ileus


3. Abdominal Distention


4. CP





-continue with mineral oil 30mg BID


-electrolyte optimization


-continue Reglan 5mg IV q8hrs


-If no improvement in sx may require neostigmine, but less likely


-improved fecal stasis overall, one tap water enema today

## 2019-01-27 NOTE — PROGRESS NOTES
DATE:  01/27/2019



The patient was given Fleet enema, tap water enema and he had three large bowel

movements.



GENERAL:  He is afebrile.  He is resting in bed comfortably, in no acute

distress.

CHEST:  Clear to auscultation bilaterally.

CARDIOVASCULAR:  Regular rate.

ABDOMEN:  Softer, nontender.  There is no guarding, rebound or generalized

peritoneal signs.

NEUROVASCULAR AND EXTREMITIES:  Otherwise normal.



LABORATORY DATA:  His white blood cell count is 5.5, H and H of 13.7 and 42.7,

platelet count is 161.  His Chem-7 and electrolytes are otherwise, unremarkable

today.



MEDICATIONS:  Tylenol, Fosamax, vitamin C, Dulcolax suppository, Os-Jasvir,

ceftriaxone, Colace, milk of magnesia, Reglan, mineral oil, MiraLax per G-tube,

Fleet enema.



REPEAT IMAGING:  KUB today shows decreased stool within the rectal region,

persistent radiopaque contrast within mildly dilated large bowel along with

persistent loops of mildly dilated small bowel.  Significance of the findings

should be correlated clinically.



IMPRESSION AND PLAN:  Discussed the case with Dr. Jose Miguel Webber.  We both feel

that the patient does not have any mechanical obstructive process.  The patient

responded well to laxatives and stool softeners and cathartic agents.  The

patient had several large bowel movements today, has no obvious signs of sepsis.

 The abdomen is benign, softer than it was yesterday.  Dr. Jose Miguel Webber, at

this time has no plans to perform a colonoscopy on the patient.  From a surgical

standpoint, probably reasonable to start the tube feedings and follow the

patient clinically.  We will follow the patient intermittently.





DD: 01/27/2019 16:58

DT: 01/27/2019 19:45

[facility]# [id number]  [id number]

## 2019-01-28 LAB
ANION GAP SERPL CALC-SCNC: 14.6 MMOL/L (ref 7–16)
BASOPHILS # BLD AUTO: 0 TH/CUMM (ref 0–0.2)
BASOPHILS NFR BLD AUTO: 0.7 % (ref 0–2)
BUN SERPL-MCNC: 4 MG/DL (ref 7–25)
CALCIUM SERPL-MCNC: 9.3 MG/DL (ref 8.6–10.3)
CHLORIDE SERPL-SCNC: 107 MEQ/L (ref 98–107)
CO2 SERPL-SCNC: 23.3 MEQ/L (ref 21–31)
CREAT SERPL-MCNC: 0.5 MG/DL (ref 0.7–1.3)
EOSINOPHIL # BLD AUTO: 0.2 TH/CMM (ref 0.1–0.4)
EOSINOPHIL NFR BLD AUTO: 4.2 % (ref 0–5)
ERYTHROCYTE [DISTWIDTH] IN BLOOD BY AUTOMATED COUNT: 11.7 % (ref 11.5–20)
GLUCOSE SERPL-MCNC: 97 MG/DL (ref 70–105)
HCT VFR BLD CALC: 43.3 % (ref 41–60)
HGB BLD-MCNC: 14.4 GM/DL (ref 12–16)
LYMPHOCYTE AB SER FC-ACNC: 2 TH/CMM (ref 1.5–3)
LYMPHOCYTES NFR BLD AUTO: 42.7 % (ref 20–50)
MCH RBC QN AUTO: 30.1 PG (ref 26–30)
MCHC RBC AUTO-ENTMCNC: 33.3 PG (ref 28–36)
MCV RBC AUTO: 90.3 FL (ref 80–99)
MONOCYTES # BLD AUTO: 0.3 TH/CMM (ref 0.3–1)
MONOCYTES NFR BLD AUTO: 7 % (ref 2–10)
NEUTROPHILS # BLD: 2.1 TH/CMM (ref 1.8–8)
NEUTROPHILS NFR BLD AUTO: 45.4 % (ref 40–80)
PLATELET # BLD: 160 TH/CMM (ref 150–400)
PMV BLD AUTO: 6.8 FL
POTASSIUM SERPL-SCNC: 3.9 MEQ/L (ref 3.5–5.1)
RBC # BLD AUTO: 4.8 MIL/CMM (ref 4.3–5.7)
SODIUM SERPL-SCNC: 141 MEQ/L (ref 136–145)
WBC # BLD AUTO: 4.6 TH/CMM (ref 4.8–10.8)

## 2019-01-28 RX ADMIN — Medication SCH TAB: at 10:40

## 2019-01-28 RX ADMIN — METOCLOPRAMIDE SCH MG: 5 INJECTION, SOLUTION INTRAMUSCULAR; INTRAVENOUS at 13:10

## 2019-01-28 RX ADMIN — METOCLOPRAMIDE SCH MG: 5 INJECTION, SOLUTION INTRAMUSCULAR; INTRAVENOUS at 05:57

## 2019-01-28 RX ADMIN — POLYETHYLENE GLYCOL 3350 SCH GM: 17 POWDER, FOR SOLUTION ORAL at 10:40

## 2019-01-28 RX ADMIN — DOCUSATE SODIUM SCH MG: 50 LIQUID ORAL at 10:40

## 2019-01-28 RX ADMIN — DEXTROSE AND SODIUM CHLORIDE SCH MLS/HR: 5; .45 INJECTION, SOLUTION INTRAVENOUS at 13:11

## 2019-01-28 NOTE — GI PROGRESS NOTE
Subjective





- Review of Systems


Subjective: 





HAVING BM





Objective





- Results


Result Diagrams: 


 01/28/19 06:05





 01/28/19 06:05


Recent Labs: 


 Laboratory Last Values











WBC  4.6 Th/cmm (4.8-10.8)  L  01/28/19  06:05    


 


RBC  4.80 Mil/cmm (4.30-5.70)   01/28/19  06:05    


 


Hgb  14.4 gm/dL (12-16)   01/28/19  06:05    


 


Hct  43.3 % (41.0-60)   01/28/19  06:05    


 


MCV  90.3 fl (80-99)   01/28/19  06:05    


 


MCH  30.1 pg (26.0-30.0)  H  01/28/19  06:05    


 


MCHC Differential  33.3 pg (28.0-36.0)   01/28/19  06:05    


 


RDW  11.7 % (11.5-20.0)   01/28/19  06:05    


 


Plt Count  160 Th/cmm (150-400)   01/28/19  06:05    


 


MPV  6.8 fl  01/28/19  06:05    


 


Add Manual Diff  YES   01/25/19  12:05    


 


Neutrophils %  45.4 % (40.0-80.0)   01/28/19  06:05    


 


Band Neutrophils %  0 % (0-10)   01/25/19  12:05    


 


Lymphocytes %  42.7 % (20.0-50.0)   01/28/19  06:05    


 


Monocytes %  7.0 % (2.0-10.0)   01/28/19  06:05    


 


Eosinophils %  4.2 % (0.0-5.0)   01/28/19  06:05    


 


Basophils %  0.7 % (0.0-2.0)   01/28/19  06:05    


 


Neutrophils (Manual)  92 % (40-80)  H  01/25/19  12:05    


 


Lymphocytes  6 % (20-50)  L  01/25/19  12:05    


 


Monocytes  2 % (2-10)   01/25/19  12:05    


 


Eosinophils  0 % (0-5)   01/25/19  12:05    


 


Basophils  0 % (0-3)   01/25/19  12:05    


 


PT  11.4 SECONDS (9.5-11.5)   01/26/19  08:44    


 


INR  1.11  (0.5-1.4)   01/26/19  08:44    


 


PTT (Actin FS)  26.7 SECONDS (26.0-38.0)   01/25/19  12:05    


 


Sodium  141 mEq/L (136-145)   01/28/19  06:05    


 


Potassium  3.9 mEq/L (3.5-5.1)   01/28/19  06:05    


 


Chloride  107 mEq/L ()   01/28/19  06:05    


 


Carbon Dioxide  23.3 mEq/L (21.0-31.0)   01/28/19  06:05    


 


Anion Gap  14.6  (7.0-16.0)   01/28/19  06:05    


 


BUN  4 mg/dL (7-25)  L  01/28/19  06:05    


 


Creatinine  0.5 mg/dL (0.7-1.3)  L  01/28/19  06:05    


 


Est GFR ( Amer)  > 60.0 ml/min (>90)   01/28/19  06:05    


 


Est GFR (Non-Af Amer)  > 60.0 ml/min  01/28/19  06:05    


 


BUN/Creatinine Ratio  8.0   01/28/19  06:05    


 


Glucose  97 mg/dL ()   01/28/19  06:05    


 


Calcium  9.3 mg/dL (8.6-10.3)   01/28/19  06:05    


 


Magnesium  2.3 mg/dL (1.9-2.7)   01/27/19  06:05    


 


Total Bilirubin  0.5 mg/dL (0.3-1.0)   01/26/19  08:44    


 


AST  16 U/L (13-39)   01/26/19  08:44    


 


ALT  12 U/L (7-52)   01/26/19  08:44    


 


Alkaline Phosphatase  46 U/L ()   01/26/19  08:44    


 


Troponin I  < 0.01 ng/mL (0.01-0.05)  L  01/25/19  12:05    


 


Total Protein  6.8 gm/dL (6.0-8.3)   01/26/19  08:44    


 


Albumin  3.9 gm/dL (4.2-5.5)  L  01/26/19  08:44    


 


Globulin  2.9 gm/dL  01/26/19  08:44    


 


Albumin/Globulin Ratio  1.3  (1.0-1.8)   01/26/19  08:44    


 


TSH  0.61 uIU/ml (0.34-5.60)   01/25/19  12:40    














- Physical Exam


Vitals and I&O: 


 Vital Signs











Temp  97.6 F   01/28/19 12:00


 


Pulse  57   01/28/19 12:00


 


Resp  18   01/28/19 12:00


 


BP  116/78   01/28/19 12:00


 


Pulse Ox  100   01/28/19 12:00








 Intake & Output











 01/27/19 01/28/19 01/28/19





 18:59 06:59 18:59


 


Intake Total 4341 268 1153


 


Balance 0730 237 3387


 


Weight (lbs) 48.534 kg  


 


Intake:   


 


  Intake, IV Amount 0921 831 6840


 


    D5-0.45NS 1,000 ml @ [phone] 0627





    mls/hr IV .Q10H [facility] Rx#:   





    [id number]   


 


  Tube Feeding 200  


 


Other:   


 


  # Voids 3  


 


  # Bowel Movements 3  


 


  Stool Characteristics  Soft Soft


 


  Weight Source Bedscale  











Active Medications: 


Current Medications





Acetaminophen (Tylenol)  650 mg GT Q4HR PRN


   PRN Reason: Pain Or Fever >99.9


   Stop: 03/27/19 00:12


   Last Admin: 01/27/19 22:05 Dose:  650 mg


Alendronate Sodium (Fosamax)  70 mg PO QSAT [facility]


   Stop: 03/27/19 00:14


   Last Admin: 01/26/19 00:25 Dose:  Not Given


Ascorbic Acid (Vitamin C)  500 mg GT DAILY [facility]


   Stop: 03/27/19 08:59


   Last Admin: 01/28/19 10:40 Dose:  500 mg


Bisacodyl (Dulcolax 10 Mg Supp)  10 mg RC Q96H PRN


   PRN Reason: MODERATE CONSTIPATION


   Stop: 03/27/19 00:12


Calcium Carbonate (Os-Jasvir)  500 mg GT DAILY [facility]


   Stop: 03/27/19 08:59


   Last Admin: 01/28/19 10:40 Dose:  500 mg


Docusate Sodium (Colace)  200 mg GT Q12HR [facility]


   Stop: 03/27/19 08:59


   Last Admin: 01/28/19 10:40 Dose:  200 mg


Dextrose/Sodium Chloride (D5-0.45ns)  1,000 mls @ 100 mls/hr IV .Q10H [facility]


   Stop: 03/26/19 14:01


   Last Admin: 01/28/19 13:11 Dose:  100 mls/hr


Ceftriaxone Sodium 1 gm/ (Sodium Chloride)  50 mls @ 100 mls/hr IV Q24HR [facility]


   Stop: 03/27/19 14:59


   Last Admin: 01/27/19 16:50 Dose:  100 mls/hr


Magnesium Hydroxide (Milk Of Magnesia)  30 ml GT Q72H PRN


   PRN Reason: Constipation


   Stop: 03/27/19 00:14


Metoclopramide HCl (Reglan)  5 mg IVP Q8HR [facility]


   Stop: 03/27/19 20:59


   Last Admin: 01/28/19 13:10 Dose:  5 mg


Mineral Oil (Mineral Oil 30 Ml)  30 ml PO BID [facility]


   Stop: 03/28/19 16:59


   Last Admin: 01/28/19 10:40 Dose:  30 ml


Miscellaneous (Meloxicam [Mobic])  7.5 mg GT BID [facility]


   Stop: 03/27/19 08:59


   Last Admin: 01/27/19 10:18 Dose:  Not Given


Nitroglycerin (Nitrostat)  0.4 mg SL Q5MIN PRN


   PRN Reason: CHEST PAIN X3


   Stop: 03/27/19 00:14


Ondansetron HCl (Zofran)  4 mg IV Q4H PRN


   PRN Reason: Nausea / Vomiting


   Stop: 03/26/19 14:14


   Last Admin: 01/26/19 17:36 Dose:  4 mg


Pantoprazole Sodium (Protonix)  40 mg IVP DAILY [facility]


   Stop: 03/27/19 08:59


   Last Admin: 01/28/19 10:40 Dose:  40 mg


Polyethylene Glycol (Miralax)  17 gm GT BID [facility]


   Stop: 03/27/19 08:59


   Last Admin: 01/28/19 10:40 Dose:  17 gm


Sodium Phosphate (Fleet Enema)  135 ml RC Q96H PRN


   PRN Reason: SEVERE CONSTIPATION


   Stop: 03/27/19 00:12


Vitamin D (Vitamin D)  400 iu GT DAILY [facility]


   Stop: 03/27/19 08:59


   Last Admin: 01/28/19 10:40 Dose:  400 iu








General: Alert, Cooperative


HEENT: Atraumatic, PERRLA, EOMI


Neck: Supple


Cardiovascular: Regular rate, Normal S1, Normal S2


Lungs: Clear to auscultation


Abdomen: Bowel sounds, Soft





- Procedures


Procedures: 


 Procedures











Procedure Code Date


 


CHANGE FEEDING DEVICE IN UP INTEST TRACT, EXTERN APPROACH [id number] 08/21/17


 


CHANGE GASTROSTOMY TUBE 75240 08/21/17


 


DIAGNOSTIC COLONOSCOPY 37709 01/12/17


 


EGD BIOPSY SINGLE/MULTIPLE 31877 07/10/14


 


ESOPHAGOGASTRODUODENOSCOPY [EGD] W/CLOSED BIOPSY 45.16 07/10/14


 


INFLUENZA VACCINATION 99.52 01/30/14


 


INSERT INDWELLING CATH 57.94 12/03/12


 


INSERT INTESTINAL TUBE 96.08 12/03/12


 


INSERT TEMP BLADDER CATH 17443 12/03/12


 


INSJ PICC 5 YR+ W/O IMAGING 56955 12/03/12


 


INSPECTION OF LOWER INTESTINAL TRACT, ENDO [id number] 01/12/17


 


OPEN AND OTHER CECECTOMY 45.72 12/03/12


 


OPEN AND OTHER RIGHT HEMICOLECTOMY 45.73 12/03/12


 


PARTIAL REMOVAL OF COLON 74959 12/03/12


 


UNLISTED PX SMALL INTESTINE 71108 12/03/12


 


VENOUS CATHETERIZATION NEC 38.93 12/03/12














Assessment/Plan





- Assessment


Assessment: 





34 YO MALE WITH MENTAL CHALLENGES WITH COLONIC ILEUS


KUB SHOWED IMPROVEMENT IN FECAL IMPACTION





1.CONT SUPP CARE AND LAXATIVES


2.CONSIDER COLO IF PROBLEM PERSIST - CAN BE DONE AS OUTPATIENT

## 2019-01-28 NOTE — DISCHARGE SUMMARY
DATE OF DISCHARGE:     01/28/2019



ADMITTING DIAGNOSES:

1.  Bowel obstruction likely secondary to fecal impaction/ileus.

2.  Mild leukocytosis.

3.  Mild renal insufficiency/azotemia.



SECONDARY DIAGNOSES:

1.  History of dysphagia/history of PEG placement.

2.  History of cerebral palsy/mental disability or intellectual disability.

3.  History of acid reflux disease.

4.  History of osteoarthritis.

5.  History of osteoporosis.



DISCHARGE DIAGNOSES:

1.  Bowel obstruction secondary to distal fecal impaction -- clinically

     improved.

2.  Distal fecal impaction/ileus -- resolved/improved.

3.  Mild leukocytosis -- resolved.

4.  Mild renal insufficiency/azotemia -- resolved.



CONSULTANTS:  Dr. Reza Askew, General Surgery and Dr. Webber, GI.



MAJOR DIAGNOSTICS:  Abdominal and pelvic CT done on the 25th showed;

1.  Markedly predominantly fluid filled dilated distal esophagus, stomach, small

bowel and colon.  The changes are associated with a markedly dilated air and

stool filled rectum and sigmoid colon.  Changes consistent with obstruction.

2.  Gastrostomy tube is in place.  On 01/25/2019, there was a small bowel follow

through.  Mildly dilated small bowel along with mildly delayed and small bowel

transit time.  No focal point of obstruction identified.

3.  Severely dilated stool filled rectum with displacement of the urinary

bladder to the right side.  Findings consistent with fecal impaction.



On 01/27/2019, KUB showed;

1.  Decrease stool within the rectal region.

2.  Persistent radiopaque contrast with a mildly dilated large bowel along with

     persistent loops of mildly dilated small bowel.



BRIEF HOSPITAL COURSE:  33-year-old disabled male with profound intellectual

disability, cerebral palsy, history of dysphagia with history of PEG placement

and history of acid reflux disease, who presented to the ED with a 1-day history

of multiple episodes of emesis.  Pertinent findings of the ED included a CT of

the abdomen and pelvis described above and also mild leukocytosis as well as a

mild renal insufficiency.  The patient was admitted to the medical floor, placed

on IV fluids, empiric IV antibiotics, Protonix, IV PPI, given previous history

of gastroesophageal reflux disease/gastritis and was placed on stool softener or

laxative and cathartic agents.  The patient was noted to have a large bowel

movement on the 27th and his G-tube feeds were restarted with no further

episodes of emesis noted.  Clinically, he appears to be comfortable, in no

distress.  His vital signs have also remained stable throughout his hospital

stay.  His white count did improve to a level of 7.6 by the 26 and his BUN went

from a level of 30 to a level of 14 by the 27th.



DISCHARGE MEDICATIONS:  Tylenol 650 q.4 hours p.r.n. for pain, Fosamax 70 mg

every Saturday, vitamin C 500 mg every day, Dulcolax 10 mg every day p.r.n. for

constipation, calcium carbonate 500 mg every day, Colace 200 mg every 12 hours,

Nexium 40 mg every day, Fleet enema q.96 or 135 mL q.96 hours p.r.n. for severe

constipation, milk of magnesia q.72 hours p.r.n., Meloxicam 7.5 b.i.d. with

meals, multivitamins and minerals daily, nitroglycerin 0.4 q.5 minutes p.r.n.

for chest pain.  It is MiraLax 17 grams b.i.d., vitamin D 400 international

units every day.



CONDITION ON DISCHARGE:  Stable.



DISPOSITION:  The patient was discharged back to [facility] under

the care of Dr. Mc.





DD: 01/28/2019 09:09

DT: 01/28/2019 17:45

JOB# [id number]  [id number]

[email or web address]

## 2019-01-28 NOTE — DIAGNOSTIC IMAGING REPORT
[facility] single view



HISTORY: Fecal impaction



COMPARISON: [facility] on 1/27/2019 and CT abdomen and pelvis on 1/25/2019



FINDINGS: Persistent diffuse gas distended loops of bowel are noted with

oral contrast throughout the large bowel and probable excreted contrast

within the urinary bladder.  Filling defect of the left urinary bladder

cannot be excluded.  Percutaneous feeding tube is noted.



IMPRESSION:



Persistent diffuse gas distended loops of bowel, primarily large bowel

loops.  No significant stool is identified.  This may be due to a marked

colonic ileus.  Clinical correlation is needed.



There appears to be previous excreted contrast from previous procedure

within the urinary bladder with filling defects noted which are

nonspecific, however, correlation should be made urinalysis and urinary

bladder ultrasound.

## 2019-02-02 ENCOUNTER — HOSPITAL ENCOUNTER (EMERGENCY)
Dept: HOSPITAL 36 - ER | Age: 34
Discharge: ANOTHER HOSPITAL | End: 2019-02-02
Payer: MEDICAID

## 2019-02-02 DIAGNOSIS — F03.90: ICD-10-CM

## 2019-02-02 DIAGNOSIS — Z43.1: Primary | ICD-10-CM

## 2019-02-02 PROCEDURE — Z7502: HCPCS

## 2019-02-02 PROCEDURE — Z7610: HCPCS

## 2019-02-02 NOTE — ED PHYSICIAN CHART
ED Chief Complaint/HPI





- Patient Information


Date Seen:: 02/02/19


Time Seen:: 19:12


Chief Complaint:: g-tube replacement


History of Present Illness:: 





this is a severe mental disability and unable to provide any information and 

smiling with a wondering gaze.the patient is a quadriplegic. 


 sent here for a g-tube replacement.


Allergies:: 


 Allergies











Allergy/AdvReac Type Severity Reaction Status Date / Time


 


No Known Allergies Allergy   Verified 02/02/19 19:08











Vitals:: 


 Vital Signs - 8 hr











  02/02/19





  19:09


 


Temp 98.6 F


 


HR 67


 


RR 18


 


/78


 


O2 Sat % 96











Historian:: EMS, Medical Records


Review:: Nurse's Note Reviewed, Transfer documents Reviewed





ED Review of Systems





- Review of Systems


General/Constitutional: No fever, No chills, No weight loss, No weakness, No 

diaphoresis, No edema, No loss of appetite, Other (this patient is unable to 

give a review of systems.)


Skin: No skin lesions, No rash, No bruising


Head: No headache, No light-headedness


Eyes: No loss of vision, No pain, No diplopia


ENT: No earache, No nasal drainage, No sore throat, No tinnitus


Neck: No neck pain, No swelling, No thyromegaly, No stiffness, No mass noted


Cardio Vascular: No chest pain, No palpitations, No PND, No orthopnea, No edema


Pulmonary: No SOB, No cough, No sputum, No wheezing


GI: No nausea, No vomiting, No diarrhea, No pain, No melena, No hematochezia, 

No constipation, No hematemesis


G/U: No dysuria, No frequency, No hematuria


Musculoskeletal: No bone or joint pain, No back pain, No muscle pain


Endocrine: No polyuria, No polydipsia


Psychiatric: No prior psych history, No depression, No anxiety, No suicidal 

ideation


Hematopoietic: No bruising, No lymphadenopathy


Allergic/Immuno: No urticaria, No angioedema


Neurological: No syncope, No focal symptoms, No weakness, No paresthesia, No 

headache, No seizure, No dizziness, No confusion, No vertigo





ED Past Medical History





- Past Medical History


Obtainable: Yes


Past Medical History: Dementia


Family History: None


Social History: Non Smoker, No Alcohol, No Drug Use, Single, Care Facility


Surgical History: PEG/GTube


Psychiatricy History: None, Dementia


Medication: Reviewed





Family Medical History





- Family Member


  ** Mother


History Unknown: Yes


Ethnicity: Non-


Living Status: Still Living





ED Physical Exam





- Physical Examination


General/Constitutional: Awake, Well-developed, well-nourished, Alert, No 

distress, GCS 15, Non-toxic appearing, Ambulatory


Other Gen/Cons comments:: 





non-verbal and disoriented


Head: Atraumatic


Eyes: Lids, conjuctiva normal, PERRL, EOMI


Skin: Nl inspection, No rash, No skin lesions, No ecchymosis, Well hydrated, No 

lymphadenopathy


ENMT: External ears, nose nl, Nasal exam nl, Lips, teeth, gums nl


Neck: Nontender, Full ROM w/o pain, No JVD, No nuchal rigidity, No bruit, No 

mass, No stridor


Respiratory: Nl effort/Exclusion, Clear to Auscultation, No Wheeze/Rhonchi/Rales


Cardio Vascular: RRR, No murmur, gallop, rubs, NL S1 S2


GI: No tenderness/rebounding/guarding, No organomegaly, No hernia, Normal BS's, 

Nondistended, No mass/bruits, No McBurney tenderness


Other GI comments:: 





peg that is not working


: No CVA tenderness


Extremities: No tenderness or effusion, Full ROM, normal strength in all 

extremities, No edema, Normal digits & nails


Neuro/Psych: Alert/oriented, DTR's symmetric, Normal sensory exam, Normal motor 

strength, Judgement/insight normal, Mood normal, Normal gait, No focal deficits


Other Neuro/Psych comments:: 





quadriplegic


Misc: Normal back, No paraspinal tenderness





ED Labs/Radiology/EKG Results





- Radiology Results


Results: 





kub = g-tube in the stomach





ED Assessment





- Assessment


General Assessment: 





g-tube replaced without difficulty





ED Septic Shock





- .


Is Septic Shock (SBP<90, OR Lactate>4 mmol\L) present?: No





- <6hrs of presentation:


Vital Signs: 


 Vital Signs - 8 hr











  02/02/19





  19:09


 


Temp 98.6 F


 


HR 67


 


RR 18


 


/78


 


O2 Sat % 96














ED Reassessment (Disposition)





- Reassessment


Reassessment Condition:: Improved





- Diagnosis


Diagnosis:: 





g-tube replacement





- Patient Disposition


Discharge/Transfer:: Long Term Care - SNF

## 2019-02-03 NOTE — DIAGNOSTIC IMAGING REPORT
Upper GI with Gastrografin



HISTORY: G-tube confirmation



COMPARISON: None



FINDINGS:  view demonstrates diffuse gas-filled loops of bowel and

moderate stool and distal fecal impaction.  Second image demonstrates

contrast opacification of the stomach and proximal small bowel.



The second image demonstrates contrast opacification of the stomach and

small bowel loops.



IMPRESSION:



Intraluminal confirmation of patient's percutaneous gastric feeding

tube.



Diffuse gaseous distended loops of bowel with moderate stool in distal

fecal impaction.  Please correlate clinically for constipation and

ileus.

## 2019-04-24 ENCOUNTER — HOSPITAL ENCOUNTER (INPATIENT)
Dept: HOSPITAL 36 - ER | Age: 34
LOS: 4 days | Discharge: NURSING, REHAB OR LONG-TERM CARE | DRG: 247 | End: 2019-04-28
Attending: GENERAL PRACTICE | Admitting: GENERAL PRACTICE
Payer: MEDICAID

## 2019-04-24 VITALS — DIASTOLIC BLOOD PRESSURE: 81 MMHG | SYSTOLIC BLOOD PRESSURE: 132 MMHG

## 2019-04-24 DIAGNOSIS — N39.0: ICD-10-CM

## 2019-04-24 DIAGNOSIS — E43: ICD-10-CM

## 2019-04-24 DIAGNOSIS — K56.41: Primary | ICD-10-CM

## 2019-04-24 DIAGNOSIS — R13.10: ICD-10-CM

## 2019-04-24 DIAGNOSIS — E86.0: ICD-10-CM

## 2019-04-24 DIAGNOSIS — D72.829: ICD-10-CM

## 2019-04-24 DIAGNOSIS — Z93.1: ICD-10-CM

## 2019-04-24 DIAGNOSIS — K92.0: ICD-10-CM

## 2019-04-24 DIAGNOSIS — G80.9: ICD-10-CM

## 2019-04-24 DIAGNOSIS — E87.0: ICD-10-CM

## 2019-04-24 DIAGNOSIS — E87.5: ICD-10-CM

## 2019-04-24 DIAGNOSIS — R53.2: ICD-10-CM

## 2019-04-24 LAB
ALBUMIN SERPL-MCNC: 5.4 GM/DL (ref 4.2–5.5)
ALBUMIN/GLOB SERPL: 1.5 {RATIO} (ref 1–1.8)
ALP SERPL-CCNC: 80 U/L (ref 34–104)
ALT SERPL-CCNC: 23 U/L (ref 7–52)
ANION GAP SERPL CALC-SCNC: 17.7 MMOL/L (ref 7–16)
APPEARANCE UR: CLEAR
AST SERPL-CCNC: 23 U/L (ref 13–39)
BACTERIA #/AREA URNS HPF: (no result) /HPF
BASOPHILS NFR BLD: 0 % (ref 0–3)
BILIRUB SERPL-MCNC: 0.5 MG/DL (ref 0.3–1)
BILIRUB UR-MCNC: (no result) MG/DL
BUN SERPL-MCNC: 24 MG/DL (ref 7–25)
CALCIUM SERPL-MCNC: 10.8 MG/DL (ref 8.6–10.3)
CHLORIDE SERPL-SCNC: 97 MEQ/L (ref 98–107)
CO2 SERPL-SCNC: 27.2 MEQ/L (ref 21–31)
COLOR UR: YELLOW
CREAT SERPL-MCNC: 0.7 MG/DL (ref 0.7–1.3)
EOSINOPHIL NFR BLD: 0 % (ref 0–5)
EPI CELLS URNS QL MICRO: (no result) /LPF
ERYTHROCYTE [DISTWIDTH] IN BLOOD BY AUTOMATED COUNT: 12.5 % (ref 11.5–20)
GLOBULIN SER-MCNC: 3.7 GM/DL
GLUCOSE SERPL-MCNC: 122 MG/DL (ref 70–105)
GLUCOSE UR STRIP-MCNC: NEGATIVE MG/DL
HCT VFR BLD CALC: 52.1 % (ref 41–60)
HGB BLD-MCNC: 17.6 GM/DL (ref 12–16)
INR PPP: 1 (ref 0.5–1.4)
KETONES UR STRIP-MCNC: 40 MG/DL
LEUKOCYTE ESTERASE UR-ACNC: NEGATIVE
LIPASE SERPL-CCNC: 19 U/L (ref 11–82)
LYMPHOCYTES # BLD MANUAL: 7 % (ref 20–50)
MCH RBC QN AUTO: 30.4 PG (ref 26–30)
MCHC RBC AUTO-ENTMCNC: 33.8 PG (ref 28–36)
MCV RBC AUTO: 90 FL (ref 80–99)
MICRO URNS: YES
MONOCYTES # BLD MANUAL: 2 % (ref 2–10)
NEUTROPHILS NFR BLD AUTO: 89 % (ref 40–80)
NEUTS BAND NFR BLD: 2 % (ref 0–10)
NITRITE UR QL STRIP: NEGATIVE
PH UR STRIP: 6 [PH] (ref 4.6–8)
PLATELET # BLD: 211 TH/CMM (ref 150–400)
PMV BLD AUTO: 6.9 FL
POTASSIUM SERPL-SCNC: 4.9 MEQ/L (ref 3.5–5.1)
PROT UR STRIP-MCNC: 30 MG/DL
PROTHROMBIN TIME: 10.4 SECONDS (ref 9.5–11.5)
RBC # BLD AUTO: 5.78 MIL/CMM (ref 4.3–5.7)
RBC # UR STRIP: NEGATIVE /UL
RBC #/AREA URNS HPF: (no result) /HPF (ref 0–5)
SODIUM SERPL-SCNC: 137 MEQ/L (ref 136–145)
SP GR UR STRIP: 1.02 (ref 1–1.03)
URINALYSIS COMPLETE PNL UR: (no result)
UROBILINOGEN UR STRIP-ACNC: 0.2 E.U./DL (ref 0.2–1)
WBC # BLD AUTO: 11.4 TH/CMM (ref 4.8–10.8)
WBC #/AREA URNS HPF: (no result) /HPF (ref 0–5)

## 2019-04-24 PROCEDURE — C9113 INJ PANTOPRAZOLE SODIUM, VIA: HCPCS

## 2019-04-24 PROCEDURE — Z7610: HCPCS

## 2019-04-24 RX ADMIN — DOCUSATE SODIUM SCH: 50 LIQUID ORAL at 21:10

## 2019-04-24 NOTE — ED PHYSICIAN CHART
ED Chief Complaint/HPI





- Patient Information


Date Seen:: 04/24/19


Time Seen:: 11:19


Chief Complaint:: Coffee ground emesis


History of Present Illness:: 


34 yo male with history of cerebral palsy, nonverbal, quadriplegic and G-tube, 

was brought from [facility] to ER for evaluation of one episode of large coffee ground 

emesis and stool impaction for a week. At ER, patient had additional 2 episodes 

of vomiting.





Allergies:: 


 Allergies











Allergy/AdvReac Type Severity Reaction Status Date / Time


 


No Known Allergies Allergy   Verified 04/24/19 10:34











Vitals:: 


 Vital Signs - 8 hr











  04/24/19





  10:30


 


Temp 98.3 F


 


HR 66


 


RR 18


 


/71


 


O2 Sat % 99














ED Review of Systems





- Review of Systems


General/Constitutional: Fever, No chills


Skin: No rash


Head: No headache


Eyes: No pain


ENT: No nasal drainage


Neck: No neck pain


Cardio Vascular: No chest pain, No edema


Pulmonary: No SOB


GI: Nausea, Vomiting, Constipation, Other (G-tube)


Musculoskeletal: No bone or joint pain


Neurological: Weakness





ED Past Medical History





- Past Medical History


Past Medical History: Other (osteoporosis, intellectual disability, hypotension

, cerebral palsy)


Social History: Non Smoker, No Alcohol, No Drug Use


Surgical History: PEG/GTube





Family Medical History





- Family Member


  ** Mother


History Unknown: Yes


Ethnicity: Non-


Living Status: Still Living





ED Physical Exam





- Physical Examination


General/Constitutional: Awake, Alert


Other Gen/Cons comments:: 


None verbal


Eyes: PERRL, EOMI


Skin: No ecchymosis


ENMT: Nasal exam nl


Neck: No nuchal rigidity


Respiratory: Clear to Auscultation


Cardio Vascular: RRR, No murmur, gallop, rubs, NL S1 S2


Other GI comments:: 


Abdominal distention 


Other Extremities comments:: 


Contracture in all 4 extremities


Other Neuro/Psych comments:: 


Non-verbal, not following command





ED Labs/Radiology/EKG Results





- Lab Results


Results: 





 Laboratory Last Values











  


 


  


 


  


 


  


 


  


 


  


 


  


 


  


 


  


 


  


 


  


 


  


 


  


 


  


 


  


 


  


 


     


 


Neutrophils (Manual)  89 % (40-80)  H  04/24/19  11:30    


 


Lymphocytes  7 % (20-50)  L  04/24/19  11:30    


 


Monocytes  2 % (2-10)   04/24/19  11:30    


 


Eosinophils  0 % (0-5)   04/24/19  11:30    


 


Basophils  0 % (0-3)   04/24/19  11:30    


 


PT  10.4 SECONDS (9.5-11.5)   04/24/19  11:30    


 


INR  1.00  (0.5-1.4)   04/24/19  11:30    


 


PTT (Actin FS)  27.3 SECONDS (26.0-38.0)   04/24/19  11:30    


 


  


 


  


 


  


 


  


 


  


 


  


 


  


 


  


 


  


 


  


 


      


 


  


 


  


 


  


 


  


 


  


 


  


 


  


 


  


 


  


 


     


 


Lipase  19 U/L (11-82)   04/24/19  11:30    


 


Urine Source  CATH   04/24/19  12:35    


 


Urine Color  YELLOW   04/24/19  12:35    


 


Urine Clarity  CLEAR  (CLEAR)   04/24/19  12:35    


 


Urine pH  6.0  (4.6 - 8.0)   04/24/19  12:35    


 


Ur Specific Gravity  1.025  (1.005-1.030)   04/24/19  12:35    


 


Urine Protein  30 mg/dL (NEGATIVE)  H  04/24/19  12:35    


 


Urine Glucose (UA)  NEGATIVE mg/dL (NEGATIVE)   04/24/19  12:35    


 


Urine Ketones  40 mg/dL (NEGATIVE)  H  04/24/19  12:35    


 


Urine Blood  NEGATIVE  (NEGATIVE)   04/24/19  12:35    


 


Urine Nitrate  NEGATIVE  (NEGATIVE)   04/24/19  12:35    


 


Urine Bilirubin  SMALL  (NEGATIVE)  H  04/24/19  12:35    


 


Urine Urobilinogen  0.2 E.U./dL (0.2 - 1.0)   04/24/19  12:35    


 


Ur Leukocyte Esterase  NEGATIVE  (NEGATIVE)   04/24/19  12:35    


 


Urine RBC  2-5 /hpf (0-5)  H  04/24/19  12:35    


 


Urine WBC  0-2 /hpf (0-5)   04/24/19  12:35    


 


Ur Epithelial Cells  FEW /lpf (FEW)   04/24/19  12:35    


 


Urine Bacteria  FEW /hpf (NONE SEEN)   04/24/19  12:35    


 


Urine Mucus  MODERATE /lpf (FEW)   04/24/19  12:35    











 Laboratory Tests











  04/24/19 04/24/19 04/24/19





  11:30 11:30 11:30


 


WBC  11.4 H  


 


RBC  5.78 H  


 


Hgb  17.6  


 


Hct  52.1  


 


MCV  90.0  


 


MCH  30.4 H  


 


MCHC Differential  33.8  


 


RDW  12.5  


 


Plt Count  211  


 


MPV  6.9  


 


Add Manual Diff  YES  


 


Band Neutrophils %  2  


 


Neutrophils (Manual)  89 H  


 


Lymphocytes  7 L  


 


Monocytes  2  


 


Eosinophils  0  


 


Basophils  0  


 


PT   10.4 


 


INR   1.00 


 


PTT (Actin FS)   27.3 


 


Sodium    137


 


Potassium    4.9


 


Chloride    97 L


 


Carbon Dioxide    27.2


 


Anion Gap    17.7 H


 


BUN    24


 


Creatinine    0.7


 


Est GFR ( Amer)    > 60.0


 


Est GFR (Non-Af Amer)    > 60.0


 


BUN/Creatinine Ratio    34.3


 


Glucose    122 H


 


Calcium    10.8 H


 


Total Bilirubin    0.5


 


AST    23


 


ALT    23


 


Alkaline Phosphatase    80


 


Total Protein    9.1 H


 


Albumin    5.4


 


Globulin    3.7


 


Albumin/Globulin Ratio    1.5


 


Lipase    19


 


Urine Source   


 


Urine Color   


 


Urine Clarity   


 


Urine pH   


 


Ur Specific Gravity   


 


Urine Protein   


 


Urine Glucose (UA)   


 


Urine Ketones   


 


Urine Blood   


 


Urine Nitrate   


 


Urine Bilirubin   


 


Urine Urobilinogen   


 


Ur Leukocyte Esterase   


 


Urine RBC   


 


Urine WBC   


 


Ur Epithelial Cells   


 


Urine Bacteria   


 


Urine Mucus   














  04/24/19





  12:35


 


WBC 


 


RBC 


 


Hgb 


 


Hct 


 


MCV 


 


MCH 


 


MCHC Differential 


 


RDW 


 


Plt Count 


 


MPV 


 


Add Manual Diff 


 


Band Neutrophils % 


 


Neutrophils (Manual) 


 


Lymphocytes 


 


Monocytes 


 


Eosinophils 


 


Basophils 


 


PT 


 


INR 


 


PTT (Actin FS) 


 


Sodium 


 


Potassium 


 


Chloride 


 


Carbon Dioxide 


 


Anion Gap 


 


BUN 


 


Creatinine 


 


Est GFR ( Amer) 


 


Est GFR (Non-Af Amer) 


 


BUN/Creatinine Ratio 


 


Glucose 


 


Calcium 


 


Total Bilirubin 


 


AST 


 


ALT 


 


Alkaline Phosphatase 


 


Total Protein 


 


Albumin 


 


Globulin 


 


Albumin/Globulin Ratio 


 


Lipase 


 


Urine Source  CATH


 


Urine Color  YELLOW


 


Urine Clarity  CLEAR


 


Urine pH  6.0


 


Ur Specific Gravity  1.025


 


Urine Protein  30 H


 


Urine Glucose (UA)  NEGATIVE


 


Urine Ketones  40 H


 


Urine Blood  NEGATIVE


 


Urine Nitrate  NEGATIVE


 


Urine Bilirubin  SMALL H


 


Urine Urobilinogen  0.2


 


Ur Leukocyte Esterase  NEGATIVE


 


Urine RBC  2-5 H


 


Urine WBC  0-2


 


Ur Epithelial Cells  FEW


 


Urine Bacteria  FEW


 


Urine Mucus  MODERATE














- Radiology Results


Results: 


CXR: no focal consolidation, distended bowel loops of the upper abdomen


CT abdomen/pelvis: Severely distended stomach with air-fluid level and fluid 

level in the esophagus, massive distal fecal impaction.





- EKG Interpretations


EKG Time:: 12:11


Rate & Rhythm: 61 bpm, normal sinus rhythm 


Comments:: 


Normal EKG





ED Assessment





- Assessment


General Assessment: 


Massive distal fecal impaction


     - Emesis


     - Severely distended stomach


     - Distended esophagus


     - Distended small bowel loops


Leukocytosis


Dehydration


Cerebral palsy


G-tube status





Assessment/Comments:: 


CBC, CMP, UA


CXR, CT abdomen/pelvis


NS 1L IV bolus


Rocephin 1g IV


Flagyl 500mg IV


NGT insertion


Admit patient under Dr. Davis for further management





ED Septic Shock





- .


Is Septic Shock (SBP<90, OR Lactate>4 mmol\L) present?: No





- <6hrs of presentation:


Vital Signs: 


 Vital Signs - 8 hr











  04/24/19





  10:30


 


Temp 98.3 F


 


HR 66


 


RR 18


 


/71


 


O2 Sat % 99














ED Reassessment (Disposition)





- Reassessment


Reassessment Condition:: Improved





- Patient Disposition


Discharge/Transfer:: Acute Care w/in this hosp


Admitting Medical Physician:: Jose Davis

## 2019-04-24 NOTE — DIAGNOSTIC IMAGING REPORT
CHEST X-RAY: AP view



INDICATION: Shortness of breath



COMPARISON: 6/13/2017



FINDINGS: Suboptimal lung volumes are noted.  There is no focal

consolidation or pleural effusions The heart is normal in size. The

osseous structures demonstrate no acute abnormalities.  Distended bowel

loops of the upper abdomen are noted.



IMPRESSION: 



No focal consolidation identified



Distended bowel loops of the upper abdomen.  Please refer to follow-up

CT abdomen and pelvis for further details.

## 2019-04-24 NOTE — DIAGNOSTIC IMAGING REPORT
CT abdomen and pelvis without intravenous contrast



Indication: Coffee-ground emesis



Comparison: Small bowel follow-through procedure on 1/25/2019 and CT

abdomen and pelvis on 1/25/2019,



Technique: Axial images were obtained from the lung bases to the

bilateral proximal femurs without IV contrast.  Coronal reconstructions

were made.  total DLP: 604,  CTDI10.6



FINDINGS:



Hypoventilatory and atelectatic changes of the lung bases are noted. 

Exam is limited due to motion and lack of IV contrast.  There is massive

distal fecal impaction with significant stool seen throughout the colon.

 There is also severe fluid distention of the stomach with air fluid

level and Fluid distention of the esophagus.  A percutaneous gastric

feeding tube is noted.  Multiple markedly dilated fluid distended loops

of small bowel are also noted.



There is mass effect upon the urinary bladder due to patient's distal

fecal impaction.  Assessment of solid organs is limited.  No obvious

focal hepatic or splenic lesions.  No evident of hydronephrosis or

nephrolithiasis.  There may be postsurgical changes along bowel loops

along the posterior right upper quadrant.  



The pancreas cannot be well evaluated on this exam.  There are few

pockets of gas seen along the anterior abdomen at the midline adjacent

to the body of the stomach (image 46 through 48, series 2).  No free

fluid identified.  Spinal scoliosis is noted.



IMPRESSION:



Massive distal fecal impaction with associated mass effect on adjacent

organs including the urinary bladder.  There is also additional copious

stool throughout the colon



Severely distended stomach with air-fluid level and fluid distended

esophagus possibly due to gastroesophageal reflux.  Multiple fluid

distended loops of small bowel are also seen.  Small bowel obstruction

can also not be excluded.  Again clinical correlation follow up

recommended.



Few atypical pockets of air seen along the midline abdomen.  This is

indeterminate and may be within the bowel lumen or may be extraluminal. 

Note is made of a  G-tube is seen on the left side of this area.  The

significance of this finding should be correlated clinically.  Follow-up

is needed.

## 2019-04-25 LAB
ALBUMIN SERPL-MCNC: 4 GM/DL (ref 4.2–5.5)
ALBUMIN/GLOB SERPL: 1.4 {RATIO} (ref 1–1.8)
ALP SERPL-CCNC: 60 U/L (ref 34–104)
ALT SERPL-CCNC: 17 U/L (ref 7–52)
ANION GAP SERPL CALC-SCNC: 17.3 MMOL/L (ref 7–16)
AST SERPL-CCNC: 17 U/L (ref 13–39)
BASOPHILS # BLD AUTO: 0 TH/CUMM (ref 0–0.2)
BASOPHILS NFR BLD AUTO: 0.2 % (ref 0–2)
BILIRUB SERPL-MCNC: 0.5 MG/DL (ref 0.3–1)
BUN SERPL-MCNC: 32 MG/DL (ref 7–25)
CALCIUM SERPL-MCNC: 8.5 MG/DL (ref 8.6–10.3)
CHLORIDE SERPL-SCNC: 107 MEQ/L (ref 98–107)
CO2 SERPL-SCNC: 21.3 MEQ/L (ref 21–31)
CREAT SERPL-MCNC: 0.7 MG/DL (ref 0.7–1.3)
EOSINOPHIL # BLD AUTO: 0 TH/CMM (ref 0.1–0.4)
EOSINOPHIL NFR BLD AUTO: 0.4 % (ref 0–5)
ERYTHROCYTE [DISTWIDTH] IN BLOOD BY AUTOMATED COUNT: 12.8 % (ref 11.5–20)
GLOBULIN SER-MCNC: 2.9 GM/DL
GLUCOSE SERPL-MCNC: 96 MG/DL (ref 70–105)
HCT VFR BLD CALC: 45.7 % (ref 41–60)
HGB BLD-MCNC: 14.9 GM/DL (ref 12–16)
LYMPHOCYTE AB SER FC-ACNC: 1.9 TH/CMM (ref 1.5–3)
LYMPHOCYTES NFR BLD AUTO: 20 % (ref 20–50)
MCH RBC QN AUTO: 30 PG (ref 26–30)
MCHC RBC AUTO-ENTMCNC: 32.7 PG (ref 28–36)
MCV RBC AUTO: 91.7 FL (ref 80–99)
MONOCYTES # BLD AUTO: 0.7 TH/CMM (ref 0.3–1)
MONOCYTES NFR BLD AUTO: 7.8 % (ref 2–10)
NEUTROPHILS # BLD: 6.8 TH/CMM (ref 1.8–8)
NEUTROPHILS NFR BLD AUTO: 71.6 % (ref 40–80)
PLATELET # BLD: 223 TH/CMM (ref 150–400)
PMV BLD AUTO: 7 FL
POTASSIUM SERPL-SCNC: 3.6 MEQ/L (ref 3.5–5.1)
RBC # BLD AUTO: 4.99 MIL/CMM (ref 4.3–5.7)
SODIUM SERPL-SCNC: 142 MEQ/L (ref 136–145)
WBC # BLD AUTO: 9.4 TH/CMM (ref 4.8–10.8)

## 2019-04-25 RX ADMIN — DOCUSATE SODIUM SCH MG: 50 LIQUID ORAL at 09:48

## 2019-04-25 RX ADMIN — POLYETHYLENE GLYCOL 3350 SCH GM: 17 POWDER, FOR SOLUTION ORAL at 09:49

## 2019-04-25 RX ADMIN — Medication SCH TAB: at 09:48

## 2019-04-25 RX ADMIN — DOCUSATE SODIUM SCH MG: 50 LIQUID ORAL at 09:58

## 2019-04-25 RX ADMIN — SODIUM PHOSPHATE, DIBASIC AND SODIUM PHOSPHATE, MONOBASIC SCH ML: 7; 19 ENEMA RECTAL at 14:43

## 2019-04-25 RX ADMIN — POLYETHYLENE GLYCOL 3350 SCH GM: 17 POWDER, FOR SOLUTION ORAL at 16:20

## 2019-04-25 NOTE — HISTORY AND PHYSICAL
History of Present Illness





- HPI


Chief Complaint: 


Coffe ground hemesis


HPI: 


Patient was send from SNF due to coffe ground hemesis. During ER evaluation 

fecal Impactation and leukocytosis was found. 


Vital Signs: 





 Last Vital Signs











Temp  98.7 F   04/25/19 09:25


 


Pulse  92   04/25/19 09:25


 


Resp  20   04/25/19 09:25


 


BP  110/67   04/25/19 09:25


 


Pulse Ox  94   04/25/19 09:25














Past Medical History


Cardiovascular: Report: No Pertinent Hx


Pulmonary: Report: No Pertinent Hx


CNS: Report: Other (MR)


GI: Report: Other (Peg in place.)


Psych: Report: Other (MR)


Musculoskeletal: Report: Stiffness, Other (Funtional quadriplegia)


Rheumatologic: Report: No pertinent Hx


Infectious Disease: Report: No Pertinent Hx


Renal/: Report: No Pertinent Hx


Endocrine: Report: No Pertinent Hx


Dermatology: Report: No Pertinent Hx





Family Medical History





- Family Member


  ** Mother


History Unknown: Yes


Ethnicity: Non-


Living Status: Still Living





Social History


Smoke: No


Alcohol: None


Drugs: None


Lives: Nursing Home


Domestic Violence: Negative





- Medications


Home Medications: 


Home Medication











 Medication  Instructions  Recorded  Type


 


Acetaminophen [Tylenol] 650 mg GT Q4HR PRN  tab 01/28/19 Rx


 


Alendronate Sodium [Fosamax*] 70 mg PO QSAT  tab 01/28/19 Rx


 


Ascorbic Acid [Vitamin C] 500 mg GT DAILY  tab 01/28/19 Rx


 


Bisacodyl [Dulcolax 10 Mg Supp] 10 mg RC Q96H PRN  sup 01/28/19 Rx


 


Calcium Carbonate [Os-Jasvir] 500 mg GT DAILY  tab 01/28/19 Rx


 


Docusate Sodium [Colace] 200 mg GT Q12HR  ud 01/28/19 Rx


 


Fleet Enema 135 ml RC Q96H PRN  btl 01/28/19 Rx


 


Magnesium Hydroxide [Milk of 30 ml GT Q72H PRN  udc 01/28/19 Rx





Magnesia]   


 


Meloxicam [Mobic] 7.5 mg GT BID 01/28/19 Rx


 


Multivitamin w/ Minerals 1 tab GT DAILY  tab 01/28/19 Rx





[Theragran M]   


 


Nitroglycerin [Nitrostat*] 0.4 mg SL Q5MIN PRN  tab 01/28/19 Rx


 


Pantoprazole [Protonix] 40 mg IVP DAILY  vial 01/28/19 Rx


 


Polyethylene Glycol 3350 [Miralax] 17 gm GT BID  pack 01/28/19 Rx


 


Peg Electrolyte Lavage Sol 4,000 ml PO PRN PRN  btl 02/08/19 Rx





[Golytely]   


 


Vitamin D 1,000 iu GT DAILY 04/24/19 History














- Allergies


Allergies/Adverse Reactions: 


 Allergies











Allergy/AdvReac Type Severity Reaction Status Date / Time


 


No Known Allergies Allergy   Verified 04/24/19 10:34














Review of Systems





- Review of Systems


Constitutional: Report: No Significant


Eyes: Report: No Significant


ENT: Report: No Significant


Respiratory: Report: No Significant


Cardiovascular: Report: No Significant


Gastrointestinal: Report: Nausea, Vomiting


Genitourinary: Report: No Significant


Musculoskeletal: Report: Other (Funtional quadriplegia)


Skin: Report: No Significant


Neurological: Report: Other (CP)





Physical Exam





- Physical Exam


HEENT: Report: Ears Nose Throat within normal limits


Neck: Report: Within normal limits


Cardiovascular Systems: Report: Regular, Rate and Rhythm


Respiratory: Report: Breath Sounds are within normal limits


Abdomen: Report: Non-tender to palpation, PEG site is clean


Back: Report: Inspection of back is within normal limits.


Extremities: Report: Extremities are contracted


Skin: Report: Color of skin is within normal limits


Neuro/Psych: Report: Other (Non verbal)





- Lab Results


All Lab Results last 24 hours: 





 Laboratory Results - last 24 hr











  04/24/19 04/24/19 04/24/19





  11:30 11:30 11:30


 


WBC  11.4 H  


 


RBC  5.78 H  


 


Hgb  17.6  


 


Hct  52.1  


 


MCV  90.0  


 


MCH  30.4 H  


 


MCHC Differential  33.8  


 


RDW  12.5  


 


Plt Count  211  


 


MPV  6.9  


 


Add Manual Diff  YES  


 


Neutrophils %   


 


Band Neutrophils %  2  


 


Lymphocytes %   


 


Monocytes %   


 


Eosinophils %   


 


Basophils %   


 


Neutrophils (Manual)  89 H  


 


Lymphocytes  7 L  


 


Monocytes  2  


 


Eosinophils  0  


 


Basophils  0  


 


PT   10.4 


 


INR   1.00 


 


PTT (Actin FS)   27.3 


 


Sodium    137


 


Potassium    4.9


 


Chloride    97 L


 


Carbon Dioxide    27.2


 


Anion Gap    17.7 H


 


BUN    24


 


Creatinine    0.7


 


Est GFR ( Amer)    > 60.0


 


Est GFR (Non-Af Amer)    > 60.0


 


BUN/Creatinine Ratio    34.3


 


Glucose    122 H


 


POC Glucose   


 


Calcium    10.8 H


 


Total Bilirubin    0.5


 


AST    23


 


ALT    23


 


Alkaline Phosphatase    80


 


Total Protein    9.1 H


 


Albumin    5.4


 


Globulin    3.7


 


Albumin/Globulin Ratio    1.5


 


Lipase    19


 


TSH   


 


Urine Source   


 


Urine Color   


 


Urine Clarity   


 


Urine pH   


 


Ur Specific Gravity   


 


Urine Protein   


 


Urine Glucose (UA)   


 


Urine Ketones   


 


Urine Blood   


 


Urine Nitrate   


 


Urine Bilirubin   


 


Urine Urobilinogen   


 


Ur Leukocyte Esterase   


 


Urine RBC   


 


Urine WBC   


 


Ur Epithelial Cells   


 


Urine Bacteria   


 


Urine Mucus   














  04/24/19 04/24/19 04/25/19





  12:35 16:49 06:00


 


WBC    9.4


 


RBC    4.99


 


Hgb    14.9


 


Hct    45.7  D


 


MCV    91.7


 


MCH    30.0


 


MCHC Differential    32.7


 


RDW    12.8


 


Plt Count    223


 


MPV    7.0


 


Add Manual Diff   


 


Neutrophils %    71.6


 


Band Neutrophils %   


 


Lymphocytes %    20.0


 


Monocytes %    7.8


 


Eosinophils %    0.4


 


Basophils %    0.2


 


Neutrophils (Manual)   


 


Lymphocytes   


 


Monocytes   


 


Eosinophils   


 


Basophils   


 


PT   


 


INR   


 


PTT (Actin FS)   


 


Sodium   


 


Potassium   


 


Chloride   


 


Carbon Dioxide   


 


Anion Gap   


 


BUN   


 


Creatinine   


 


Est GFR ( Amer)   


 


Est GFR (Non-Af Amer)   


 


BUN/Creatinine Ratio   


 


Glucose   


 


POC Glucose   117 H 


 


Calcium   


 


Total Bilirubin   


 


AST   


 


ALT   


 


Alkaline Phosphatase   


 


Total Protein   


 


Albumin   


 


Globulin   


 


Albumin/Globulin Ratio   


 


Lipase   


 


TSH   


 


Urine Source  CATH  


 


Urine Color  YELLOW  


 


Urine Clarity  CLEAR  


 


Urine pH  6.0  


 


Ur Specific Gravity  1.025  


 


Urine Protein  30 H  


 


Urine Glucose (UA)  NEGATIVE  


 


Urine Ketones  40 H  


 


Urine Blood  NEGATIVE  


 


Urine Nitrate  NEGATIVE  


 


Urine Bilirubin  SMALL H  


 


Urine Urobilinogen  0.2  


 


Ur Leukocyte Esterase  NEGATIVE  


 


Urine RBC  2-5 H  


 


Urine WBC  0-2  


 


Ur Epithelial Cells  FEW  


 


Urine Bacteria  FEW  


 


Urine Mucus  MODERATE  














  04/25/19 04/25/19





  06:00 06:00


 


WBC  


 


RBC  


 


Hgb  


 


Hct  


 


MCV  


 


MCH  


 


MCHC Differential  


 


RDW  


 


Plt Count  


 


MPV  


 


Add Manual Diff  


 


Neutrophils %  


 


Band Neutrophils %  


 


Lymphocytes %  


 


Monocytes %  


 


Eosinophils %  


 


Basophils %  


 


Neutrophils (Manual)  


 


Lymphocytes  


 


Monocytes  


 


Eosinophils  


 


Basophils  


 


PT  


 


INR  


 


PTT (Actin FS)  


 


Sodium  142 


 


Potassium  3.6 


 


Chloride  107 


 


Carbon Dioxide  21.3 


 


Anion Gap  17.3 H 


 


BUN  32 H 


 


Creatinine  0.7 


 


Est GFR ( Amer)  > 60.0 


 


Est GFR (Non-Af Amer)  > 60.0 


 


BUN/Creatinine Ratio  45.7 


 


Glucose  96 


 


POC Glucose  


 


Calcium  8.5 L 


 


Total Bilirubin  0.5 


 


AST  17 


 


ALT  17 


 


Alkaline Phosphatase  60 


 


Total Protein  6.9 


 


Albumin  4.0 L 


 


Globulin  2.9 


 


Albumin/Globulin Ratio  1.4 


 


Lipase  


 


TSH   0.39


 


Urine Source  


 


Urine Color  


 


Urine Clarity  


 


Urine pH  


 


Ur Specific Gravity  


 


Urine Protein  


 


Urine Glucose (UA)  


 


Urine Ketones  


 


Urine Blood  


 


Urine Nitrate  


 


Urine Bilirubin  


 


Urine Urobilinogen  


 


Ur Leukocyte Esterase  


 


Urine RBC  


 


Urine WBC  


 


Ur Epithelial Cells  


 


Urine Bacteria  


 


Urine Mucus  














- Assessment


Assessment: 


Patient is awake, calm. Dx: Fecal impactation, Coffe ground emesis, Leukocytosis

, UTI, dehydration, CP, Peg in place. 





- Plan


Plan: 


Patient in IV NS, IV AB, Continue with SNF meds, Consult with GI requested.  

Will continue to monitor.

## 2019-04-25 NOTE — CONSULTATION
DATE OF CONSULTATION:  04/25/2019



REQUESTING PHYSICIAN:  Dr. Davis.



REASON FOR CONSULTATION:  For coffee-ground emesis.



Thank you for allowing us to see this patient in consultation.



HISTORY OF PRESENT ILLNESS:  This is a 33-year-old male with significant

developmental delay and history of constipation in the past who presents with

coffee-ground emesis.  The patient is having recurrent vomiting in the Emergency

Room as well as one time in the floor.  He was thus started on Zofran, Protonix

and given one tap water enema.  Per nursing report, the patient has not had any

further episodes of vomiting and has been fairly comfortable.



PAST MEDICAL HISTORY:  Profound intellectual disability and cerebral palsy.



PAST SURGICAL HISTORY:  Includes PEG tube placement.



FAMILY HISTORY:  Noncontributory for GI disease.



SOCIAL HISTORY:  No tobacco, alcohol or drugs.



MEDICATIONS:  Have been reviewed.



PHYSICAL EXAMINATION:

VITAL SIGNS:  Temperature of 99, pulse of 89, respiratory rate of 18, blood

pressure is 118/79.

GENERAL:  In no acute distress.

HEENT:  Normocephalic, atraumatic.  PERRLA positive.

LUNGS:  Clear bilaterally.  No wheezes, rales or rhonchi.

HEART:  Regular rate and rhythm, normal S1, S2.

ABDOMEN:  Soft, nontender.  Bowel sounds are positive.

EXTREMITIES:  Show no lower extremity edema.

PSYCH:  Could not assess.

NEUROLOGIC:  Could not participate in exam.



LAB DATA:  Hemoglobin 14.9.  Sodium 142, potassium 3.6, chloride 107, CO2 of

21.3.



IMAGING:  CT of the abdomen and pelvis showed severe distal fecal impaction

compressing on other organs.



ASSESSMENT AND PLAN:  This is a 33-year-old male with severe intellectual

disability and functional quadriplegia, gastric-tube dependent, who presents

with severe distal fecal impaction and coffee-ground emesis.

1.  Coffee-ground emesis.

2.  Distal fecal impaction, likely contributing to coffee-ground emesis.

3.  Severe acute on chronic constipation.

4.  Intellectual disability.

5.  Gastric-tube dependence.



RECOMMENDATIONS:

1.  We would recommend aggressive disimpaction with enemas, Fleet enemas at

least 1 to 2 times daily until the patient has had several just loose bowel

movements.

2.  We will also recommend 2 dose of MiraLax through the G-tube to assist with

bowel function.

3.  We will check KUB tomorrow.

4.  Supportive care management.  Continue with Protonix and Zofran as needed.



Thank you for allowing me to participate in this patient's care.





DD: 04/25/2019 13:45

DT: 04/25/2019 14:14

JOB# [id number]  [id number]

## 2019-04-26 LAB
ALBUMIN SERPL-MCNC: 4 GM/DL (ref 4.2–5.5)
ALBUMIN/GLOB SERPL: 1.3 {RATIO} (ref 1–1.8)
ALP SERPL-CCNC: 45 U/L (ref 34–104)
ALT SERPL-CCNC: 17 U/L (ref 7–52)
ANION GAP SERPL CALC-SCNC: 22.3 MMOL/L (ref 7–16)
AST SERPL-CCNC: 40 U/L (ref 13–39)
BASOPHILS # BLD AUTO: 0.1 TH/CUMM (ref 0–0.2)
BASOPHILS NFR BLD AUTO: 1.5 % (ref 0–2)
BILIRUB SERPL-MCNC: 0.5 MG/DL (ref 0.3–1)
BUN SERPL-MCNC: 28 MG/DL (ref 7–25)
CALCIUM SERPL-MCNC: 8.1 MG/DL (ref 8.6–10.3)
CHLORIDE SERPL-SCNC: 115 MEQ/L (ref 98–107)
CO2 SERPL-SCNC: 15.2 MEQ/L (ref 21–31)
CREAT SERPL-MCNC: 0.6 MG/DL (ref 0.7–1.3)
EOSINOPHIL # BLD AUTO: 0 TH/CMM (ref 0.1–0.4)
EOSINOPHIL NFR BLD AUTO: 0.3 % (ref 0–5)
ERYTHROCYTE [DISTWIDTH] IN BLOOD BY AUTOMATED COUNT: 12.4 % (ref 11.5–20)
GLOBULIN SER-MCNC: 3.1 GM/DL
GLUCOSE SERPL-MCNC: 73 MG/DL (ref 70–105)
HCT VFR BLD CALC: 37.4 % (ref 41–60)
HGB BLD-MCNC: 12.7 GM/DL (ref 12–16)
LYMPHOCYTE AB SER FC-ACNC: 1.2 TH/CMM (ref 1.5–3)
LYMPHOCYTES NFR BLD AUTO: 21.6 % (ref 20–50)
MCH RBC QN AUTO: 30.9 PG (ref 26–30)
MCHC RBC AUTO-ENTMCNC: 33.8 PG (ref 28–36)
MCV RBC AUTO: 91.5 FL (ref 80–99)
MONOCYTES # BLD AUTO: 0.3 TH/CMM (ref 0.3–1)
MONOCYTES NFR BLD AUTO: 4.9 % (ref 2–10)
NEUTROPHILS # BLD: 4 TH/CMM (ref 1.8–8)
NEUTROPHILS NFR BLD AUTO: 71.7 % (ref 40–80)
PLATELET # BLD: 126 TH/CMM (ref 150–400)
PMV BLD AUTO: 7.5 FL
POTASSIUM SERPL-SCNC: 5.5 MEQ/L (ref 3.5–5.1)
RBC # BLD AUTO: 4.09 MIL/CMM (ref 4.3–5.7)
SODIUM SERPL-SCNC: 147 MEQ/L (ref 136–145)
WBC # BLD AUTO: 5.6 TH/CMM (ref 4.8–10.8)

## 2019-04-26 RX ADMIN — POLYETHYLENE GLYCOL 3350 SCH GM: 17 POWDER, FOR SOLUTION ORAL at 08:36

## 2019-04-26 RX ADMIN — SODIUM PHOSPHATE, DIBASIC AND SODIUM PHOSPHATE, MONOBASIC SCH ML: 7; 19 ENEMA RECTAL at 11:56

## 2019-04-26 RX ADMIN — DOCUSATE SODIUM SCH MG: 50 LIQUID ORAL at 09:57

## 2019-04-26 RX ADMIN — Medication SCH TAB: at 08:35

## 2019-04-26 RX ADMIN — POLYETHYLENE GLYCOL 3350 SCH GM: 17 POWDER, FOR SOLUTION ORAL at 17:05

## 2019-04-26 NOTE — GI PROGRESS NOTE
Subjective





- Review of Systems


Service Date: 04/26/19


Events since last encounter: 





PT HAD A LARGE BM





Objective





- Results


Result Diagrams: 


 04/26/19 05:40





 04/26/19 05:40


Recent Labs: 


 Laboratory Last Values











WBC  5.6 Th/cmm (4.8-10.8)   04/26/19  05:40    


 


RBC  4.09 Mil/cmm (4.30-5.70)  L  04/26/19  05:40    


 


Hgb  12.7 gm/dL (12-16)   04/26/19  05:40    


 


Hct  37.4 % (41.0-60)  L D 04/26/19  05:40    


 


MCV  91.5 fl (80-99)   04/26/19  05:40    


 


MCH  30.9 pg (26.0-30.0)  H  04/26/19  05:40    


 


MCHC Differential  33.8 pg (28.0-36.0)   04/26/19  05:40    


 


RDW  12.4 % (11.5-20.0)   04/26/19  05:40    


 


Plt Count  126 Th/cmm (150-400)  L D 04/26/19  05:40    


 


MPV  7.5 fl  04/26/19  05:40    


 


Add Manual Diff  YES   04/24/19  11:30    


 


Neutrophils %  71.7 % (40.0-80.0)   04/26/19  05:40    


 


Band Neutrophils %  2 % (0-10)   04/24/19  11:30    


 


Lymphocytes %  21.6 % (20.0-50.0)   04/26/19  05:40    


 


Monocytes %  4.9 % (2.0-10.0)   04/26/19  05:40    


 


Eosinophils %  0.3 % (0.0-5.0)   04/26/19  05:40    


 


Basophils %  1.5 % (0.0-2.0)   04/26/19  05:40    


 


Neutrophils (Manual)  89 % (40-80)  H  04/24/19  11:30    


 


Lymphocytes  7 % (20-50)  L  04/24/19  11:30    


 


Monocytes  2 % (2-10)   04/24/19  11:30    


 


Eosinophils  0 % (0-5)   04/24/19  11:30    


 


Basophils  0 % (0-3)   04/24/19  11:30    


 


PT  10.4 SECONDS (9.5-11.5)   04/24/19  11:30    


 


INR  1.00  (0.5-1.4)   04/24/19  11:30    


 


PTT (Actin FS)  27.3 SECONDS (26.0-38.0)   04/24/19  11:30    


 


Sodium  147 mEq/L (136-145)  H  04/26/19  05:40    


 


Potassium  5.5 mEq/L (3.5-5.1)  H  04/26/19  05:40    


 


Chloride  115 mEq/L ()  H  04/26/19  05:40    


 


Carbon Dioxide  15.2 mEq/L (21.0-31.0)  L  04/26/19  05:40    


 


Anion Gap  22.3  (7.0-16.0)  H  04/26/19  05:40    


 


BUN  28 mg/dL (7-25)  H  04/26/19  05:40    


 


Creatinine  0.6 mg/dL (0.7-1.3)  L  04/26/19  05:40    


 


Est GFR ( Amer)  > 60.0 ml/min (>90)   04/26/19  05:40    


 


Est GFR (Non-Af Amer)  > 60.0 ml/min  04/26/19  05:40    


 


BUN/Creatinine Ratio  46.7   04/26/19  05:40    


 


Glucose  73 mg/dL ()   04/26/19  05:40    


 


POC Glucose  117 MG/DL (70 - 105)  H  04/24/19  16:49    


 


Calcium  8.1 mg/dL (8.6-10.3)  L  04/26/19  05:40    


 


Total Bilirubin  0.5 mg/dL (0.3-1.0)   04/26/19  05:40    


 


AST  40 U/L (13-39)  H  04/26/19  05:40    


 


ALT  17 U/L (7-52)   04/26/19  05:40    


 


Alkaline Phosphatase  45 U/L ()   04/26/19  05:40    


 


Total Protein  7.1 gm/dL (6.0-8.3)   04/26/19  05:40    


 


Albumin  4.0 gm/dL (4.2-5.5)  L  04/26/19  05:40    


 


Globulin  3.1 gm/dL  04/26/19  05:40    


 


Albumin/Globulin Ratio  1.3  (1.0-1.8)   04/26/19  05:40    


 


Lipase  19 U/L (11-82)   04/24/19  11:30    


 


TSH  0.39 uIU/ml (0.34-5.60)   04/25/19  06:00    


 


Urine Source  CATH   04/24/19  12:35    


 


Urine Color  YELLOW   04/24/19  12:35    


 


Urine Clarity  CLEAR  (CLEAR)   04/24/19  12:35    


 


Urine pH  6.0  (4.6 - 8.0)   04/24/19  12:35    


 


Ur Specific Gravity  1.025  (1.005-1.030)   04/24/19  12:35    


 


Urine Protein  30 mg/dL (NEGATIVE)  H  04/24/19  12:35    


 


Urine Glucose (UA)  NEGATIVE mg/dL (NEGATIVE)   04/24/19  12:35    


 


Urine Ketones  40 mg/dL (NEGATIVE)  H  04/24/19  12:35    


 


Urine Blood  NEGATIVE  (NEGATIVE)   04/24/19  12:35    


 


Urine Nitrate  NEGATIVE  (NEGATIVE)   04/24/19  12:35    


 


Urine Bilirubin  SMALL  (NEGATIVE)  H  04/24/19  12:35    


 


Urine Urobilinogen  0.2 E.U./dL (0.2 - 1.0)   04/24/19  12:35    


 


Ur Leukocyte Esterase  NEGATIVE  (NEGATIVE)   04/24/19  12:35    


 


Urine RBC  2-5 /hpf (0-5)  H  04/24/19  12:35    


 


Urine WBC  0-2 /hpf (0-5)   04/24/19  12:35    


 


Ur Epithelial Cells  FEW /lpf (FEW)   04/24/19  12:35    


 


Urine Bacteria  FEW /hpf (NONE SEEN)   04/24/19  12:35    


 


Urine Mucus  MODERATE /lpf (FEW)   04/24/19  12:35    














- Physical Exam


Vitals and I&O: 


 Vital Signs











Temp  98.5 F   04/26/19 20:00


 


Pulse  87   04/26/19 20:00


 


Resp  18   04/26/19 20:00


 


BP  106/71   04/26/19 20:00


 


Pulse Ox  98   04/26/19 18:57








 Intake & Output











 04/26/19 04/26/19 04/27/19





 06:59 18:59 06:59


 


Intake Total 924 25 


 


Balance 924 25 


 


Weight (lbs) 43.817 kg 43.998 kg 


 


Intake:   


 


  Intake, IV Amount 924  


 


    Sodium Chloride 0.9% 1, 924  





    000 ml @ 110 mls/hr IV .   





    Q9H6M [facility] Rx#:[id number]   


 


  Oral 0 25 


 


Other:   


 


  # Voids  2 


 


  # Bowel Movements  1 


 


  Weight Source Bedscale Bedscale 











Active Medications: 


Current Medications





Acetaminophen (Tylenol)  650 mg GT Q4HR PRN


   PRN Reason: Pain Or Fever >99.9


   Stop: 06/23/19 19:22


Alendronate Sodium (Fosamax)  70 mg PO QSAT [facility]


   Stop: 06/26/19 07:29


Ascorbic Acid (Vitamin C)  500 mg GT DAILY [facility]


   Stop: 06/24/19 08:59


   Last Admin: 04/26/19 08:35 Dose:  500 mg


Bisacodyl (Dulcolax 10 Mg Supp)  10 mg RC Q24HR PRN


   PRN Reason: MODERATE CONSTIPATION


   Stop: 06/24/19 11:44


Calcium Carbonate (Os-Jasvir)  500 mg GT DAILY [facility]


   Stop: 06/24/19 08:59


   Last Admin: 04/26/19 08:35 Dose:  500 mg


Cholecalciferol (Vitamin D3)  1,000 iu GT DAILY [facility]


   Stop: 06/24/19 08:59


   Last Admin: 04/26/19 08:35 Dose:  1,000 iu


Docusate Sodium (Colace)  200 mg GT Q24HR [facility]


   Stop: 06/24/19 09:44


   Last Admin: 04/26/19 09:57 Dose:  200 mg


Ceftriaxone Sodium 1 gm/ (Sodium Chloride)  50 mls @ 100 mls/hr IV Q24HR [facility]


   Stop: 06/24/19 11:59


   Last Admin: 04/26/19 12:46 Dose:  100 mls/hr


Dextrose (D5w)  1,000 mls @ 100 mls/hr IV .Q10H [facility]


   Stop: 06/25/19 08:59


   Last Admin: 04/26/19 09:14 Dose:  100 mls/hr


Lactobacillus Rhamnosus (Culturelle 15b)  1 each PO DAILY [facility]


   Stop: 06/26/19 08:59


Magnesium Hydroxide (Milk Of Magnesia)  30 ml GT Q24HR PRN


   PRN Reason: Constipation


   Stop: 06/24/19 11:44


Mineral Oil (Mineral Oil 30 Ml)  30 ml PO DAILY PRN


   PRN Reason: Constipation


   Stop: 06/24/19 13:43


Miscellaneous (Probiotic Screen)  1 ea MC PRN PRN


   PRN Reason: PROTOCOL


   Stop: 06/25/19 13:57


Ondansetron HCl (Zofran)  4 mg IV Q8H PRN


   PRN Reason: Nausea / Vomiting


   Stop: 06/23/19 15:56


   Last Admin: 04/24/19 17:09 Dose:  4 mg


Pantoprazole Sodium (Protonix)  40 mg IVP BID [facility]


   Stop: 06/23/19 16:59


   Last Admin: 04/26/19 17:05 Dose:  40 mg


Polyethylene Glycol (Miralax)  17 gm GT BID [facility]


   Stop: 06/24/19 08:59


   Last Admin: 04/26/19 17:05 Dose:  17 gm


Sodium Phosphate (Fleet Enema)  135 ml RC Q24HR [facility]


   Stop: 06/24/19 11:44


   Last Admin: 04/26/19 11:56 Dose:  135 ml








General: Alert, Other (Patient is non verbal profound MR)


HEENT: Atraumatic


Neck: Supple


Cardiovascular: Regular rate


Abdomen: Bowel sounds


Extremities: Other (Contracted)


Neurological: Other (Non ambulatory)


Skin: Other (Warm and dry)


Psych/Mental Status: Other (Profound MR)





- Procedures


Procedures: 


 Procedures











Procedure Code Date


 


CHANGE FEEDING DEVICE IN UP INTEST TRACT, EXTERN APPROACH [id number] 08/21/17


 


CHANGE GASTROSTOMY TUBE 71703 08/21/17


 


DIAGNOSTIC COLONOSCOPY 09850 01/12/17


 


EGD BIOPSY SINGLE/MULTIPLE 17421 07/10/14


 


ESOPHAGOGASTRODUODENOSCOPY [EGD] W/CLOSED BIOPSY 45.16 07/10/14


 


INFLUENZA VACCINATION 99.52 01/30/14


 


INSERT INDWELLING CATH 57.94 12/03/12


 


INSERT INTESTINAL TUBE 96.08 12/03/12


 


INSERT TEMP BLADDER CATH 26953 12/03/12


 


INSJ PICC 5 YR+ W/O IMAGING 69129 12/03/12


 


INSPECTION OF LOWER INTESTINAL TRACT, ENDO [id number] 01/12/17


 


OPEN AND OTHER CECECTOMY 45.72 12/03/12


 


OPEN AND OTHER RIGHT HEMICOLECTOMY 45.73 12/03/12


 


PARTIAL REMOVAL OF COLON 38327 12/03/12


 


UNLISTED PX SMALL INTESTINE 69849 12/03/12


 


VENOUS CATHETERIZATION NEC 38.93 12/03/12














Assessment/Plan





- Assessment


Assessment: 





1. Fecal impaction


2.  Coffee ground emesis


3. Abdominal distention


4. Non verbal





-continue with mineral oil through G tube


-enema prn


-daily stool softeners


-okay to advance diet, and G tube for supplement and meds

## 2019-04-26 NOTE — GENERAL PROGRESS NOTE
Subjective





- Review of Systems


Service Date: 04/26/10


Subjective: 


Patient non verbal





Objective





- Results


Result Diagrams: 


 19 05:40





 19 05:40


Recent Labs: 


 Laboratory Last Values











WBC  5.6 Th/cmm (4.8-10.8)   19  05:40    


 


RBC  4.09 Mil/cmm (4.30-5.70)  L  19  05:40    


 


Hgb  12.7 gm/dL (12-16)   19  05:40    


 


Hct  37.4 % (41.0-60)  L D 19  05:40    


 


MCV  91.5 fl (80-99)   19  05:40    


 


MCH  30.9 pg (26.0-30.0)  H  19  05:40    


 


MCHC Differential  33.8 pg (28.0-36.0)   19  05:40    


 


RDW  12.4 % (11.5-20.0)   19  05:40    


 


Plt Count  126 Th/cmm (150-400)  L D 19  05:40    


 


MPV  7.5 fl  19  05:40    


 


Add Manual Diff  YES   19  11:30    


 


Neutrophils %  71.7 % (40.0-80.0)   19  05:40    


 


Band Neutrophils %  2 % (0-10)   19  11:30    


 


Lymphocytes %  21.6 % (20.0-50.0)   19  05:40    


 


Monocytes %  4.9 % (2.0-10.0)   19  05:40    


 


Eosinophils %  0.3 % (0.0-5.0)   19  05:40    


 


Basophils %  1.5 % (0.0-2.0)   19  05:40    


 


Neutrophils (Manual)  89 % (40-80)  H  19  11:30    


 


Lymphocytes  7 % (20-50)  L  19  11:30    


 


Monocytes  2 % (2-10)   19  11:30    


 


Eosinophils  0 % (0-5)   19  11:30    


 


Basophils  0 % (0-3)   19  11:30    


 


PT  10.4 SECONDS (9.5-11.5)   19  11:30    


 


INR  1.00  (0.5-1.4)   19  11:30    


 


PTT (Actin FS)  27.3 SECONDS (26.0-38.0)   19  11:30    


 


Sodium  147 mEq/L (136-145)  H  19  05:40    


 


Potassium  5.5 mEq/L (3.5-5.1)  H  19  05:40    


 


Chloride  115 mEq/L ()  H  19  05:40    


 


Carbon Dioxide  15.2 mEq/L (21.0-31.0)  L  19  05:40    


 


Anion Gap  22.3  (7.0-16.0)  H  19  05:40    


 


BUN  28 mg/dL (7-25)  H  19  05:40    


 


Creatinine  0.6 mg/dL (0.7-1.3)  L  19  05:40    


 


Est GFR ( Amer)  > 60.0 ml/min (>90)   19  05:40    


 


Est GFR (Non-Af Amer)  > 60.0 ml/min  19  05:40    


 


BUN/Creatinine Ratio  46.7   19  05:40    


 


Glucose  73 mg/dL ()   19  05:40    


 


POC Glucose  117 MG/DL (70 - 105)  H  19  16:49    


 


Calcium  8.1 mg/dL (8.6-10.3)  L  19  05:40    


 


Total Bilirubin  0.5 mg/dL (0.3-1.0)   19  05:40    


 


AST  40 U/L (13-39)  H  19  05:40    


 


ALT  17 U/L (7-52)   19  05:40    


 


Alkaline Phosphatase  45 U/L ()   19  05:40    


 


Total Protein  7.1 gm/dL (6.0-8.3)   19  05:40    


 


Albumin  4.0 gm/dL (4.2-5.5)  L  19  05:40    


 


Globulin  3.1 gm/dL  19  05:40    


 


Albumin/Globulin Ratio  1.3  (1.0-1.8)   19  05:40    


 


Lipase  19 U/L (11-82)   19  11:30    


 


TSH  0.39 uIU/ml (0.34-5.60)   19  06:00    


 


Urine Source  CATH   19  12:35    


 


Urine Color  YELLOW   19  12:35    


 


Urine Clarity  CLEAR  (CLEAR)   19  12:35    


 


Urine pH  6.0  (4.6 - 8.0)   19  12:35    


 


Ur Specific Gravity  1.025  (1.005-1.030)   19  12:35    


 


Urine Protein  30 mg/dL (NEGATIVE)  H  19  12:35    


 


Urine Glucose (UA)  NEGATIVE mg/dL (NEGATIVE)   19  12:35    


 


Urine Ketones  40 mg/dL (NEGATIVE)  H  19  12:35    


 


Urine Blood  NEGATIVE  (NEGATIVE)   19  12:35    


 


Urine Nitrate  NEGATIVE  (NEGATIVE)   19  12:35    


 


Urine Bilirubin  SMALL  (NEGATIVE)  H  19  12:35    


 


Urine Urobilinogen  0.2 E.U./dL (0.2 - 1.0)   19  12:35    


 


Ur Leukocyte Esterase  NEGATIVE  (NEGATIVE)   19  12:35    


 


Urine RBC  2-5 /hpf (0-5)  H  19  12:35    


 


Urine WBC  0-2 /hpf (0-5)   19  12:35    


 


Ur Epithelial Cells  FEW /lpf (FEW)   19  12:35    


 


Urine Bacteria  FEW /hpf (NONE SEEN)   19  12:35    


 


Urine Mucus  MODERATE /lpf (FEW)   19  12:35    














- Physical Exam


Vitals and I&O: 


 Vital Signs











Temp  98.1 F   19 08:37


 


Pulse  83   19 08:37


 


Resp  18   19 08:37


 


BP  119/60   19 08:37


 


Pulse Ox  97   19 08:37








 Intake & Output











 19





 18:59 06:59 18:59


 


Intake Total  924 


 


Balance  924 


 


Weight (lbs)  43.817 kg 


 


Intake:   


 


  Intake, IV Amount  924 


 


    Sodium Chloride 0.9% 1,  924 





    000 ml @ 110 mls/hr IV .   





    Q9H6M [facility] Rx#:[id number]   


 


  Oral  0 


 


Other:   


 


  Weight Source  Bedscale 











Active Medications: 


Current Medications





Acetaminophen (Tylenol)  650 mg GT Q4HR PRN


   PRN Reason: Pain Or Fever >99.9


   Stop: 19 19:22


Alendronate Sodium (Fosamax)  70 mg PO QSAT [facility]


   Stop: 19 07:29


Ascorbic Acid (Vitamin C)  500 mg GT DAILY [facility]


   Stop: 19 08:59


   Last Admin: 19 08:35 Dose:  500 mg


Bisacodyl (Dulcolax 10 Mg Supp)  10 mg RC Q24HR PRN


   PRN Reason: MODERATE CONSTIPATION


   Stop: 19 11:44


Calcium Carbonate (Os-Ayleen)  500 mg GT DAILY [facility]


   Stop: 19 08:59


   Last Admin: 19 08:35 Dose:  500 mg


Cholecalciferol (Vitamin D3)  1,000 iu GT DAILY [facility]


   Stop: 19 08:59


   Last Admin: 19 08:35 Dose:  1,000 iu


Docusate Sodium (Colace)  200 mg GT Q24HR [facility]


   Stop: 19 09:44


   Last Admin: 19 09:58 Dose:  200 mg


Ceftriaxone Sodium 1 gm/ (Sodium Chloride)  50 mls @ 100 mls/hr IV Q24HR [facility]


   Stop: 19 11:59


   Last Admin: 19 12:06 Dose:  100 mls/hr


Dextrose (D5w)  1,000 mls @ 100 mls/hr IV .Q10H [facility]


   Stop: 19 08:59


Magnesium Hydroxide (Milk Of Magnesia)  30 ml GT Q24HR PRN


   PRN Reason: Constipation


   Stop: 19 11:44


Mineral Oil (Mineral Oil 30 Ml)  30 ml PO DAILY PRN


   PRN Reason: Constipation


   Stop: 19 13:43


Ondansetron HCl (Zofran)  4 mg IV Q8H PRN


   PRN Reason: Nausea / Vomiting


   Stop: 19 15:56


   Last Admin: 19 17:09 Dose:  4 mg


Pantoprazole Sodium (Protonix)  40 mg IVP BID [facility]


   Stop: 19 16:59


   Last Admin: 19 08:36 Dose:  40 mg


Polyethylene Glycol (Miralax)  17 gm GT BID [facility]


   Stop: 19 08:59


   Last Admin: 19 08:36 Dose:  17 gm


Sodium Phosphate (Fleet Enema)  135 ml RC Q24HR [facility]


   Stop: 19 11:44


   Last Admin: 19 14:43 Dose:  135 ml


Sodium Polystyrene Sulfonate (Kayexalate)  30 gm PO X1 [facility]


   Stop: 19 09:01








General: Alert, Other (Patient is non verbal profound MR)


HEENT: Atraumatic


Neck: Supple


Cardiovascular: Regular rate


Abdomen: Bowel sounds


Extremities: Other (Contracted)


Neurological: Other (Non ambulatory)


Skin: Other (Warm and dry)


Psych/Mental Status: Other (Profound MR)





- Procedures


Procedures: 


 Procedures











Procedure Code Date


 


CHANGE FEEDING DEVICE IN UP INTEST TRACT, EXTERN APPROACH [id number] 17


 


CHANGE GASTROSTOMY TUBE 29035 17


 


DIAGNOSTIC COLONOSCOPY 02521 17


 


EGD BIOPSY SINGLE/MULTIPLE 60502 07/10/14


 


ESOPHAGOGASTRODUODENOSCOPY [EGD] W/CLOSED BIOPSY 45.16 07/10/14


 


INFLUENZA VACCINATION 99.52 14


 


INSERT INDWELLING CATH 57.94 12


 


INSERT INTESTINAL TUBE 96.08 12


 


INSERT TEMP BLADDER CATH 48873 12


 


INSJ PICC 5 YR+ W/O IMAGING 76082 12


 


INSPECTION OF LOWER INTESTINAL TRACT, ENDO [id number] 17


 


OPEN AND OTHER CECECTOMY 45.72 12


 


OPEN AND OTHER RIGHT HEMICOLECTOMY 45.73 12


 


PARTIAL REMOVAL OF COLON 21730 12


 


UNLISTED PX SMALL INTESTINE 43935 12


 


VENOUS CATHETERIZATION NEC 38.93 12














Assessment/Plan





- Assessment


Assessment: 


Patient is awake, calm. Na and K high, WBC normal. Dx: Fecal impactation, Coffe 

ground emesis, Leukocytosis, UTI, dehydration, Hypernatremia, Hyperkalemia, 

Intellectually disable CP, Peg in place. 





- Plan


Plan: 


Patient in IV NS, IV AB, Continue with SNF meds, already seen by KYLEE Velasco one time, IV change to D5. Will continue to monitor. 





Nutritional Asmnt/Malnutr-PDOC





- Dietary Evaluation


Malnutrition Findings (Please click <Entered> for more info): 








Nutritional Asmnt/Malnutrition                             Start:  19 15:

45


Text:                                                      Status: Complete    

  


Freq:                                                                          

  


Protocol:                                                                      

  


 Document     19 15:45  LCHENG  (Rec: 19 16:08  HEN  [id number])


 Nutritional Asmnt/Malnutrition


     Patient General Information


      Nutritional Screening                      High Risk


                                                 Consult


      Diagnosis                                  ileus, fecal impaction


      Pertinent Medical Hx/Surgical Hx           CP, quadriplegic, Gtube


      Subjective Information                     Pt seen lying in bed, non


                                                 verbal, NPO status. Consult


                                                 received for vlad 10.


      Current Diet Order/ Nutrition Support      NPO


      Pertinent Medications                      vitC, os-ayleen, vit D3, colace,


                                                 mineral oil, protonix, miralax


                                                 , nacl 0.9%


      Pertinent Labs                              BUN 32, Cr 8.5, alb 4.0


     Nutritional Hx/Data


      Height                                     1.68 m


      Height (Calculated Centimeters)            167.6


      Current Weight (lbs)                       44.906 kg


      Weight (Calculated Kilograms)              44.9


      Weight (Calculated Grams)                  20194.6


      Ideal Body Weight                          142


      Body Mass Index (BMI)                      16.0


      Weight Status                              Underweight


     GI Symptoms


      GI Symptoms                                None


      Last BM                                    none


      Difficult in:                              None


      Skin Integrity/Comment:                    intact, healed incision.


                                                 vlad 10


     Estimated Nutritional Goals


      BEE in Kcals:                              Using Current wt


      Calories/Kcals/Kg                          30-35


      Kcals Calculated                           8330-1562


      Protein:                                   Using Current wt


      Protein g/k.2-1.4


      Protein Calculated                         54-63


      Fluid: ml                                  1350-1575ml (1ml/kcal)


     Nutritional Problem


      1. Problem


       Problem                                   inadequate energy intake


       Etiology                                  GI dysfunction


       Signs/Symptoms:                           GT not initiated, NPO status


     Intervention/Recommendation


      Comments                                   1. Monitor NPO status.


                                                 2. Monitor GI symptoms, wt,


                                                 skin integrity and labs


                                                 3. F/U as high risk in 2-3


                                                 days


     Expected Outcomes/Goals


      Expected Outcomes/Goals                    1. Pt to meet at least 90% of


                                                 nutritional needs via


                                                 nutrition support with


                                                 tolerance


                                                 2. Wt stability, skin to


                                                 remain intact, labs to


                                                 approach WNL.

## 2019-04-27 LAB
ALBUMIN SERPL-MCNC: 4 GM/DL (ref 4.2–5.5)
ALBUMIN/GLOB SERPL: 1.4 {RATIO} (ref 1–1.8)
ALP SERPL-CCNC: 51 U/L (ref 34–104)
ALT SERPL-CCNC: 15 U/L (ref 7–52)
ANION GAP SERPL CALC-SCNC: 13.3 MMOL/L (ref 7–16)
AST SERPL-CCNC: 20 U/L (ref 13–39)
BASOPHILS # BLD AUTO: 0 TH/CUMM (ref 0–0.2)
BASOPHILS NFR BLD AUTO: 0.5 % (ref 0–2)
BILIRUB SERPL-MCNC: 0.5 MG/DL (ref 0.3–1)
BUN SERPL-MCNC: 10 MG/DL (ref 7–25)
CALCIUM SERPL-MCNC: 8.3 MG/DL (ref 8.6–10.3)
CHLORIDE SERPL-SCNC: 105 MEQ/L (ref 98–107)
CO2 SERPL-SCNC: 25.7 MEQ/L (ref 21–31)
CREAT SERPL-MCNC: 0.5 MG/DL (ref 0.7–1.3)
EOSINOPHIL # BLD AUTO: 0.1 TH/CMM (ref 0.1–0.4)
EOSINOPHIL NFR BLD AUTO: 2.2 % (ref 0–5)
ERYTHROCYTE [DISTWIDTH] IN BLOOD BY AUTOMATED COUNT: 12.3 % (ref 11.5–20)
GLOBULIN SER-MCNC: 2.9 GM/DL
GLUCOSE SERPL-MCNC: 118 MG/DL (ref 70–105)
HCT VFR BLD CALC: 39.5 % (ref 41–60)
HGB BLD-MCNC: 13.1 GM/DL (ref 12–16)
LYMPHOCYTE AB SER FC-ACNC: 1.9 TH/CMM (ref 1.5–3)
LYMPHOCYTES NFR BLD AUTO: 38.5 % (ref 20–50)
MCH RBC QN AUTO: 30 PG (ref 26–30)
MCHC RBC AUTO-ENTMCNC: 33.2 PG (ref 28–36)
MCV RBC AUTO: 90.5 FL (ref 80–99)
MONOCYTES # BLD AUTO: 0.4 TH/CMM (ref 0.3–1)
MONOCYTES NFR BLD AUTO: 8.8 % (ref 2–10)
NEUTROPHILS # BLD: 2.6 TH/CMM (ref 1.8–8)
NEUTROPHILS NFR BLD AUTO: 50 % (ref 40–80)
PLATELET # BLD: 158 TH/CMM (ref 150–400)
PMV BLD AUTO: 6.6 FL
POTASSIUM SERPL-SCNC: 3 MEQ/L (ref 3.5–5.1)
RBC # BLD AUTO: 4.36 MIL/CMM (ref 4.3–5.7)
SODIUM SERPL-SCNC: 141 MEQ/L (ref 136–145)
WBC # BLD AUTO: 5 TH/CMM (ref 4.8–10.8)

## 2019-04-27 RX ADMIN — POLYETHYLENE GLYCOL 3350 SCH GM: 17 POWDER, FOR SOLUTION ORAL at 17:17

## 2019-04-27 RX ADMIN — Medication SCH EACH: at 08:51

## 2019-04-27 RX ADMIN — DOCUSATE SODIUM SCH MG: 50 LIQUID ORAL at 11:30

## 2019-04-27 RX ADMIN — POLYETHYLENE GLYCOL 3350 SCH GM: 17 POWDER, FOR SOLUTION ORAL at 08:51

## 2019-04-27 RX ADMIN — SODIUM PHOSPHATE, DIBASIC AND SODIUM PHOSPHATE, MONOBASIC SCH ML: 7; 19 ENEMA RECTAL at 17:24

## 2019-04-27 RX ADMIN — Medication SCH TAB: at 08:51

## 2019-04-27 NOTE — GI PROGRESS NOTE
Subjective





- Review of Systems


Subjective: 





NO EVENTS





Objective





- Results


Result Diagrams: 


 04/27/19 07:48





 04/27/19 07:48


Recent Labs: 


 Laboratory Last Values











WBC  5.0 Th/cmm (4.8-10.8)   04/27/19  07:48    


 


RBC  4.36 Mil/cmm (4.30-5.70)   04/27/19  07:48    


 


Hgb  13.1 gm/dL (12-16)   04/27/19  07:48    


 


Hct  39.5 % (41.0-60)  L  04/27/19  07:48    


 


MCV  90.5 fl (80-99)   04/27/19  07:48    


 


MCH  30.0 pg (26.0-30.0)   04/27/19  07:48    


 


MCHC Differential  33.2 pg (28.0-36.0)   04/27/19  07:48    


 


RDW  12.3 % (11.5-20.0)   04/27/19  07:48    


 


Plt Count  158 Th/cmm (150-400)   04/27/19  07:48    


 


MPV  6.6 fl  04/27/19  07:48    


 


Add Manual Diff  YES   04/24/19  11:30    


 


Neutrophils %  50.0 % (40.0-80.0)   04/27/19  07:48    


 


Band Neutrophils %  2 % (0-10)   04/24/19  11:30    


 


Lymphocytes %  38.5 % (20.0-50.0)   04/27/19  07:48    


 


Monocytes %  8.8 % (2.0-10.0)   04/27/19  07:48    


 


Eosinophils %  2.2 % (0.0-5.0)   04/27/19  07:48    


 


Basophils %  0.5 % (0.0-2.0)   04/27/19  07:48    


 


Neutrophils (Manual)  89 % (40-80)  H  04/24/19  11:30    


 


Lymphocytes  7 % (20-50)  L  04/24/19  11:30    


 


Monocytes  2 % (2-10)   04/24/19  11:30    


 


Eosinophils  0 % (0-5)   04/24/19  11:30    


 


Basophils  0 % (0-3)   04/24/19  11:30    


 


PT  10.4 SECONDS (9.5-11.5)   04/24/19  11:30    


 


INR  1.00  (0.5-1.4)   04/24/19  11:30    


 


PTT (Actin FS)  27.3 SECONDS (26.0-38.0)   04/24/19  11:30    


 


Sodium  141 mEq/L (136-145)   04/27/19  07:48    


 


Potassium  3.0 mEq/L (3.5-5.1)  L D 04/27/19  07:48    


 


Chloride  105 mEq/L ()   04/27/19  07:48    


 


Carbon Dioxide  25.7 mEq/L (21.0-31.0)   04/27/19  07:48    


 


Anion Gap  13.3  (7.0-16.0)   04/27/19  07:48    


 


BUN  10 mg/dL (7-25)   04/27/19  07:48    


 


Creatinine  0.5 mg/dL (0.7-1.3)  L  04/27/19  07:48    


 


Est GFR ( Amer)  > 60.0 ml/min (>90)   04/27/19  07:48    


 


Est GFR (Non-Af Amer)  > 60.0 ml/min  04/27/19  07:48    


 


BUN/Creatinine Ratio  20.0   04/27/19  07:48    


 


Glucose  118 mg/dL ()  H  04/27/19  07:48    


 


POC Glucose  117 MG/DL (70 - 105)  H  04/24/19  16:49    


 


Calcium  8.3 mg/dL (8.6-10.3)  L  04/27/19  07:48    


 


Total Bilirubin  0.5 mg/dL (0.3-1.0)   04/27/19  07:48    


 


AST  20 U/L (13-39)   04/27/19  07:48    


 


ALT  15 U/L (7-52)   04/27/19  07:48    


 


Alkaline Phosphatase  51 U/L ()   04/27/19  07:48    


 


Total Protein  6.9 gm/dL (6.0-8.3)   04/27/19  07:48    


 


Albumin  4.0 gm/dL (4.2-5.5)  L  04/27/19  07:48    


 


Globulin  2.9 gm/dL  04/27/19  07:48    


 


Albumin/Globulin Ratio  1.4  (1.0-1.8)   04/27/19  07:48    


 


Lipase  19 U/L (11-82)   04/24/19  11:30    


 


TSH  0.39 uIU/ml (0.34-5.60)   04/25/19  06:00    


 


Urine Source  CATH   04/24/19  12:35    


 


Urine Color  YELLOW   04/24/19  12:35    


 


Urine Clarity  CLEAR  (CLEAR)   04/24/19  12:35    


 


Urine pH  6.0  (4.6 - 8.0)   04/24/19  12:35    


 


Ur Specific Gravity  1.025  (1.005-1.030)   04/24/19  12:35    


 


Urine Protein  30 mg/dL (NEGATIVE)  H  04/24/19  12:35    


 


Urine Glucose (UA)  NEGATIVE mg/dL (NEGATIVE)   04/24/19  12:35    


 


Urine Ketones  40 mg/dL (NEGATIVE)  H  04/24/19  12:35    


 


Urine Blood  NEGATIVE  (NEGATIVE)   04/24/19  12:35    


 


Urine Nitrate  NEGATIVE  (NEGATIVE)   04/24/19  12:35    


 


Urine Bilirubin  SMALL  (NEGATIVE)  H  04/24/19  12:35    


 


Urine Urobilinogen  0.2 E.U./dL (0.2 - 1.0)   04/24/19  12:35    


 


Ur Leukocyte Esterase  NEGATIVE  (NEGATIVE)   04/24/19  12:35    


 


Urine RBC  2-5 /hpf (0-5)  H  04/24/19  12:35    


 


Urine WBC  0-2 /hpf (0-5)   04/24/19  12:35    


 


Ur Epithelial Cells  FEW /lpf (FEW)   04/24/19  12:35    


 


Urine Bacteria  FEW /hpf (NONE SEEN)   04/24/19  12:35    


 


Urine Mucus  MODERATE /lpf (FEW)   04/24/19  12:35    














- Physical Exam


Vitals and I&O: 


 Vital Signs











Temp  98.0 F   04/27/19 04:00


 


Pulse  73   04/27/19 04:00


 


Resp  18   04/27/19 04:00


 


BP  111/84   04/27/19 04:00


 


Pulse Ox  95   04/27/19 04:00








 Intake & Output











 04/26/19 04/27/19 04/27/19





 18:59 06:59 18:59


 


Intake Total 25 1200 


 


Balance 25 1200 


 


Weight (lbs) 43.998 kg 43.998 kg 


 


Intake:   


 


  Intake, IV Amount  1000 


 


    Dextrose 5% 1,000 ml @  1000 





    100 mls/hr IV .Q10H [facility]   





    Rx#:[id number]   


 


  Oral 25  


 


  Other  200 


 


Other:   


 


  # Voids 2 3 


 


  # Bowel Movements 1  


 


  Weight Source Bedscale Bedscale 











Active Medications: 


Current Medications





Acetaminophen (Tylenol)  650 mg GT Q4HR PRN


   PRN Reason: Pain Or Fever >99.9


   Stop: 06/23/19 19:22


   Last Admin: 04/27/19 04:15 Dose:  650 mg


Alendronate Sodium (Fosamax)  70 mg PO QSAT [facility]


   Stop: 06/26/19 07:29


   Last Admin: 04/27/19 06:34 Dose:  70 mg


Ascorbic Acid (Vitamin C)  500 mg GT DAILY [facility]


   Stop: 06/24/19 08:59


   Last Admin: 04/26/19 08:35 Dose:  500 mg


Bisacodyl (Dulcolax 10 Mg Supp)  10 mg RC Q24HR PRN


   PRN Reason: MODERATE CONSTIPATION


   Stop: 06/24/19 11:44


Calcium Carbonate (Os-Jasvir)  500 mg GT DAILY [facility]


   Stop: 06/24/19 08:59


   Last Admin: 04/26/19 08:35 Dose:  500 mg


Cholecalciferol (Vitamin D3)  1,000 iu GT DAILY [facility]


   Stop: 06/24/19 08:59


   Last Admin: 04/26/19 08:35 Dose:  1,000 iu


Docusate Sodium (Colace)  200 mg GT Q24HR [facility]


   Stop: 06/24/19 09:44


   Last Admin: 04/26/19 09:57 Dose:  200 mg


Ceftriaxone Sodium 1 gm/ (Sodium Chloride)  50 mls @ 100 mls/hr IV Q24HR [facility]


   Stop: 06/24/19 11:59


   Last Admin: 04/26/19 12:46 Dose:  100 mls/hr


Dextrose (D5w)  1,000 mls @ 100 mls/hr IV .Q10H [facility]


   Stop: 06/25/19 08:59


   Last Admin: 04/27/19 06:35 Dose:  100 mls/hr


Lactobacillus Rhamnosus (Culturelle 15b)  1 each PO DAILY [facility]


   Stop: 06/26/19 08:59


Magnesium Hydroxide (Milk Of Magnesia)  30 ml GT Q24HR PRN


   PRN Reason: Constipation


   Stop: 06/24/19 11:44


Mineral Oil (Mineral Oil 30 Ml)  30 ml PO DAILY PRN


   PRN Reason: Constipation


   Stop: 06/24/19 13:43


Miscellaneous (Probiotic Screen)  1 ea MC PRN PRN


   PRN Reason: PROTOCOL


   Stop: 06/25/19 13:57


Ondansetron HCl (Zofran)  4 mg IV Q8H PRN


   PRN Reason: Nausea / Vomiting


   Stop: 06/23/19 15:56


   Last Admin: 04/24/19 17:09 Dose:  4 mg


Pantoprazole Sodium (Protonix)  40 mg IVP BID [facility]


   Stop: 06/23/19 16:59


   Last Admin: 04/26/19 17:05 Dose:  40 mg


Polyethylene Glycol (Miralax)  17 gm GT BID [facility]


   Stop: 06/24/19 08:59


   Last Admin: 04/26/19 17:05 Dose:  17 gm


Sodium Phosphate (Fleet Enema)  135 ml RC Q24HR [facility]


   Stop: 06/24/19 11:44


   Last Admin: 04/26/19 11:56 Dose:  135 ml








General: Alert, Other (Patient is non verbal profound MR)


HEENT: Atraumatic


Neck: Supple


Cardiovascular: Regular rate


Abdomen: Bowel sounds


Extremities: Other (Contracted)


Neurological: Other (Non ambulatory)


Skin: Other (Warm and dry)


Psych/Mental Status: Other (Profound MR)





- Procedures


Procedures: 


 Procedures











Procedure Code Date


 


CHANGE FEEDING DEVICE IN UP INTEST TRACT, EXTERN APPROACH 2D05UYR 08/21/17


 


CHANGE GASTROSTOMY TUBE 43293 08/21/17


 


DIAGNOSTIC COLONOSCOPY 05146 01/12/17


 


EGD BIOPSY SINGLE/MULTIPLE 99129 07/10/14


 


ESOPHAGOGASTRODUODENOSCOPY [EGD] W/CLOSED BIOPSY 45.16 07/10/14


 


INFLUENZA VACCINATION 99.52 01/30/14


 


INSERT INDWELLING CATH 57.94 12/03/12


 


INSERT INTESTINAL TUBE 96.08 12/03/12


 


INSERT TEMP BLADDER CATH 74489 12/03/12


 


INSJ PICC 5 YR+ W/O IMAGING 05459 12/03/12


 


INSPECTION OF LOWER INTESTINAL TRACT, ENDO [id number] 01/12/17


 


OPEN AND OTHER CECECTOMY 45.72 12/03/12


 


OPEN AND OTHER RIGHT HEMICOLECTOMY 45.73 12/03/12


 


PARTIAL REMOVAL OF COLON 79468 12/03/12


 


UNLISTED PX SMALL INTESTINE 83681 12/03/12


 


VENOUS CATHETERIZATION NEC 38.93 12/03/12














Assessment/Plan





- Assessment


Assessment: 





32 YO MALE WITH DYSPHAGIA AND FECAL IMPACTION


HAS GT BUT KRISTAL PO DIET





1.CONT SUPP CARE


2.CONT LAXATIVES

## 2019-04-27 NOTE — DIAGNOSTIC IMAGING REPORT
KUB abdominal film



HISTORY: Abdominal distention



The exam demonstrates multiple loops of mildly dilated large and small

bowel.  Findings may be associated with an adynamic ileus. 

Balloontipped catheter projects over the left upper quadrant.  No free

intraperitoneal air.



IMPRESSION:

1.  Mildly dilated air-filled large and small bowel.  Findings suggest

an a dynamic ileus.  CT documented changes of fecal impaction not

appreciated at this time.  If symptoms persist, a small bowel

follow-through would provide for exclusion of obstruction.

## 2019-04-27 NOTE — GENERAL PROGRESS NOTE
Subjective





- Review of Systems


Service Date: 19


Subjective: 


Patient non verbal 





Objective





- Results


Result Diagrams: 


 19 07:48





 19 07:48


Recent Labs: 


 Laboratory Last Values











WBC  5.0 Th/cmm (4.8-10.8)   19  07:48    


 


RBC  4.36 Mil/cmm (4.30-5.70)   19  07:48    


 


Hgb  13.1 gm/dL (12-16)   19  07:48    


 


Hct  39.5 % (41.0-60)  L  19  07:48    


 


MCV  90.5 fl (80-99)   19  07:48    


 


MCH  30.0 pg (26.0-30.0)   19  07:48    


 


MCHC Differential  33.2 pg (28.0-36.0)   19  07:48    


 


RDW  12.3 % (11.5-20.0)   19  07:48    


 


Plt Count  158 Th/cmm (150-400)   19  07:48    


 


MPV  6.6 fl  19  07:48    


 


Add Manual Diff  YES   19  11:30    


 


Neutrophils %  50.0 % (40.0-80.0)   19  07:48    


 


Band Neutrophils %  2 % (0-10)   19  11:30    


 


Lymphocytes %  38.5 % (20.0-50.0)   19  07:48    


 


Monocytes %  8.8 % (2.0-10.0)   19  07:48    


 


Eosinophils %  2.2 % (0.0-5.0)   19  07:48    


 


Basophils %  0.5 % (0.0-2.0)   19  07:48    


 


Neutrophils (Manual)  89 % (40-80)  H  19  11:30    


 


Lymphocytes  7 % (20-50)  L  19  11:30    


 


Monocytes  2 % (2-10)   19  11:30    


 


Eosinophils  0 % (0-5)   19  11:30    


 


Basophils  0 % (0-3)   19  11:30    


 


PT  10.4 SECONDS (9.5-11.5)   19  11:30    


 


INR  1.00  (0.5-1.4)   19  11:30    


 


PTT (Actin FS)  27.3 SECONDS (26.0-38.0)   19  11:30    


 


Sodium  141 mEq/L (136-145)   19  07:48    


 


Potassium  3.0 mEq/L (3.5-5.1)  L D 19  07:48    


 


Chloride  105 mEq/L ()   19  07:48    


 


Carbon Dioxide  25.7 mEq/L (21.0-31.0)   19  07:48    


 


Anion Gap  13.3  (7.0-16.0)   19  07:48    


 


BUN  10 mg/dL (7-25)   19  07:48    


 


Creatinine  0.5 mg/dL (0.7-1.3)  L  19  07:48    


 


Est GFR ( Amer)  > 60.0 ml/min (>90)   19  07:48    


 


Est GFR (Non-Af Amer)  > 60.0 ml/min  19  07:48    


 


BUN/Creatinine Ratio  20.0   19  07:48    


 


Glucose  118 mg/dL ()  H  19  07:48    


 


POC Glucose  117 MG/DL (70 - 105)  H  19  16:49    


 


Calcium  8.3 mg/dL (8.6-10.3)  L  19  07:48    


 


Total Bilirubin  0.5 mg/dL (0.3-1.0)   19  07:48    


 


AST  20 U/L (13-39)   19  07:48    


 


ALT  15 U/L (7-52)   19  07:48    


 


Alkaline Phosphatase  51 U/L ()   19  07:48    


 


Total Protein  6.9 gm/dL (6.0-8.3)   19  07:48    


 


Albumin  4.0 gm/dL (4.2-5.5)  L  19  07:48    


 


Globulin  2.9 gm/dL  19  07:48    


 


Albumin/Globulin Ratio  1.4  (1.0-1.8)   19  07:48    


 


Lipase  19 U/L (11-82)   19  11:30    


 


TSH  0.39 uIU/ml (0.34-5.60)   19  06:00    


 


Urine Source  CATH   19  12:35    


 


Urine Color  YELLOW   19  12:35    


 


Urine Clarity  CLEAR  (CLEAR)   19  12:35    


 


Urine pH  6.0  (4.6 - 8.0)   19  12:35    


 


Ur Specific Gravity  1.025  (1.005-1.030)   19  12:35    


 


Urine Protein  30 mg/dL (NEGATIVE)  H  19  12:35    


 


Urine Glucose (UA)  NEGATIVE mg/dL (NEGATIVE)   19  12:35    


 


Urine Ketones  40 mg/dL (NEGATIVE)  H  19  12:35    


 


Urine Blood  NEGATIVE  (NEGATIVE)   19  12:35    


 


Urine Nitrate  NEGATIVE  (NEGATIVE)   19  12:35    


 


Urine Bilirubin  SMALL  (NEGATIVE)  H  19  12:35    


 


Urine Urobilinogen  0.2 E.U./dL (0.2 - 1.0)   19  12:35    


 


Ur Leukocyte Esterase  NEGATIVE  (NEGATIVE)   19  12:35    


 


Urine RBC  2-5 /hpf (0-5)  H  19  12:35    


 


Urine WBC  0-2 /hpf (0-5)   19  12:35    


 


Ur Epithelial Cells  FEW /lpf (FEW)   19  12:35    


 


Urine Bacteria  FEW /hpf (NONE SEEN)   19  12:35    


 


Urine Mucus  MODERATE /lpf (FEW)   19  12:35    














- Physical Exam


Vitals and I&O: 


 Vital Signs











Temp  97.6 F   19 11:53


 


Pulse  79   19 11:53


 


Resp  18   19 11:53


 


BP  128/65   19 11:53


 


Pulse Ox  98   19 11:53








 Intake & Output











 19





 18:59 06:59 18:59


 


Intake Total 25 1200 


 


Balance 25 1200 


 


Weight (lbs) 43.998 kg 43.998 kg 


 


Intake:   


 


  Intake, IV Amount  1000 


 


    Dextrose 5% 1,000 ml @  1000 





    100 mls/hr IV .Q10H [facility]   





    Rx#:[id number]   


 


  Oral 25  


 


  Other  200 


 


Other:   


 


  # Voids 2 3 


 


  # Bowel Movements 1  


 


  Stool Characteristics   Soft





   Liquid





   Brown


 


  Weight Source Bedscale Bedscale 











Active Medications: 


Current Medications





Acetaminophen (Tylenol)  650 mg GT Q4HR PRN


   PRN Reason: Pain Or Fever >99.9


   Stop: 19 19:22


   Last Admin: 19 04:15 Dose:  650 mg


Alendronate Sodium (Fosamax)  70 mg PO QSAT [facility]


   Stop: 19 07:29


   Last Admin: 19 06:34 Dose:  70 mg


Ascorbic Acid (Vitamin C)  500 mg GT DAILY [facility]


   Stop: 19 08:59


   Last Admin: 19 08:51 Dose:  500 mg


Bisacodyl (Dulcolax 10 Mg Supp)  10 mg RC Q24HR PRN


   PRN Reason: MODERATE CONSTIPATION


   Stop: 19 11:44


Calcium Carbonate (Os-Ayleen)  500 mg GT DAILY [facility]


   Stop: 19 08:59


   Last Admin: 19 08:51 Dose:  500 mg


Cholecalciferol (Vitamin D3)  1,000 iu GT DAILY [facility]


   Stop: 19 08:59


   Last Admin: 19 08:51 Dose:  1,000 iu


Docusate Sodium (Colace)  200 mg GT Q24HR [facility]


   Stop: 19 09:44


   Last Admin: 19 11:30 Dose:  200 mg


Ceftriaxone Sodium 1 gm/ (Sodium Chloride)  50 mls @ 100 mls/hr IV Q24HR [facility]


   Stop: 19 11:59


   Last Admin: 19 12:46 Dose:  100 mls/hr


Dextrose (D5w)  1,000 mls @ 100 mls/hr IV .Q10H [facility]


   Stop: 19 08:59


   Last Admin: 19 06:35 Dose:  100 mls/hr


Lactobacillus Rhamnosus (Culturelle 15b)  1 each PO DAILY [facility]


   Stop: 19 08:59


   Last Admin: 19 08:51 Dose:  1 each


Magnesium Hydroxide (Milk Of Magnesia)  30 ml GT Q24HR PRN


   PRN Reason: Constipation


   Stop: 19 11:44


Mineral Oil (Mineral Oil 30 Ml)  30 ml PO DAILY PRN


   PRN Reason: Constipation


   Stop: 19 13:43


Miscellaneous (Probiotic Screen)  1 ea MC PRN PRN


   PRN Reason: PROTOCOL


   Stop: 19 13:57


Ondansetron HCl (Zofran)  4 mg IV Q8H PRN


   PRN Reason: Nausea / Vomiting


   Stop: 19 15:56


   Last Admin: 19 17:09 Dose:  4 mg


Pantoprazole Sodium (Protonix)  40 mg IVP BID [facility]


   Stop: 19 16:59


   Last Admin: 19 08:51 Dose:  40 mg


Polyethylene Glycol (Miralax)  17 gm GT BID [facility]


   Stop: 19 08:59


   Last Admin: 19 08:51 Dose:  17 gm


Sodium Phosphate (Fleet Enema)  135 ml RC Q24HR [facility]


   Stop: 19 11:44


   Last Admin: 19 11:56 Dose:  135 ml








General: Alert, Other (Patient is non verbal, profound MR)


HEENT: Atraumatic


Neck: Supple


Cardiovascular: Regular rate


Abdomen: Bowel sounds


Extremities: Other (Contracted)


Neurological: Other (Non ambulatory)


Skin: Other (Warm and dry)


Psych/Mental Status: Other (Profound MR)





- Procedures


Procedures: 


 Procedures











Procedure Code Date


 


CHANGE FEEDING DEVICE IN UP INTEST TRACT, EXTERN APPROACH 2V01MIP 17


 


CHANGE GASTROSTOMY TUBE 49887 17


 


DIAGNOSTIC COLONOSCOPY 80508 17


 


EGD BIOPSY SINGLE/MULTIPLE 56135 07/10/14


 


ESOPHAGOGASTRODUODENOSCOPY [EGD] W/CLOSED BIOPSY 45.16 07/10/14


 


INFLUENZA VACCINATION 99.52 14


 


INSERT INDWELLING CATH 57.94 12


 


INSERT INTESTINAL TUBE 96.08 12


 


INSERT TEMP BLADDER CATH 11257 12


 


INS PICC 5 YR+ W/O IMAGING 48257 12


 


INSPECTION OF LOWER INTESTINAL TRACT, ENDO [id number] 17


 


OPEN AND OTHER CECECTOMY 45.72 12


 


OPEN AND OTHER RIGHT HEMICOLECTOMY 45.73 12


 


PARTIAL REMOVAL OF COLON 72381 12


 


UNLISTED PX SMALL INTESTINE 38525 12


 


VENOUS CATHETERIZATION NEC 38.93 12














Assessment/Plan





- Assessment


Assessment: 


Patient is awake, calm. Potassium low. He had one bowel movement.  Dx: Fecal 

impactation, [place] ground emesis, Leukocytosis, UTI, dehydration, Hypernatremia

, Hyperkalemia, Intellectually disable CP, Peg in place. 





- Plan


Plan: 


Patient in IV NS, IV AB, Continue with SNF meds, already seen by GI.  K is 

replaced. IV change to D5. Will continue to monitor. 





Nutritional Asmnt/Malnutr-PDOC





- Dietary Evaluation


Malnutrition Findings (Please click <Entered> for more info): 








Nutritional Asmnt/Malnutrition                             Start:  19 15:

45


Text:                                                      Status: Complete    

  


Freq:                                                                          

  


Protocol:                                                                      

  


 Document     19 15:45  HEN  (Rec: 19 16:08  HEN  [id number])


 Nutritional Asmnt/Malnutrition


     Patient General Information


      Nutritional Screening                      High Risk


                                                 Consult


      Diagnosis                                  ileus, fecal impaction


      Pertinent Medical Hx/Surgical Hx           CP, quadriplegic, Gtube


      Subjective Information                     Pt seen lying in bed, non


                                                 verbal, NPO status. Consult


                                                 received for vlad 10.


      Current Diet Order/ Nutrition Support      NPO


      Pertinent Medications                      vitC, os-ayleen, vit D3, colace,


                                                 mineral oil, protonix, miralax


                                                 , nacl 0.9%


      Pertinent Labs                              BUN 32, Cr 8.5, alb 4.0


     Nutritional Hx/Data


      Height                                     1.68 m


      Height (Calculated Centimeters)            167.6


      Current Weight (lbs)                       44.906 kg


      Weight (Calculated Kilograms)              44.9


      Weight (Calculated Grams)                  18047.6


      Ideal Body Weight                          142


      Body Mass Index (BMI)                      16.0


      Weight Status                              Underweight


     GI Symptoms


      GI Symptoms                                None


      Last BM                                    none


      Difficult in:                              None


      Skin Integrity/Comment:                    intact, healed incision.


                                                 vlad 10


     Estimated Nutritional Goals


      BEE in Kcals:                              Using Current wt


      Calories/Kcals/Kg                          30-35


      Kcals Calculated                           6636-5346


      Protein:                                   Using Current wt


      Protein g/k.2-1.4


      Protein Calculated                         54-63


      Fluid: ml                                  1350-1575ml (1ml/kcal)


     Nutritional Problem


      1. Problem


       Problem                                   inadequate energy intake


       Etiology                                  GI dysfunction


       Signs/Symptoms:                           GT not initiated, NPO status


     Intervention/Recommendation


      Comments                                   1. Monitor NPO status.


                                                 2. Monitor GI symptoms, wt,


                                                 skin integrity and labs


                                                 3. F/U as high risk in 2-3


                                                 days


     Expected Outcomes/Goals


      Expected Outcomes/Goals                    1. Pt to meet at least 90% of


                                                 nutritional needs via


                                                 nutrition support with


                                                 tolerance


                                                 2. Wt stability, skin to


                                                 remain intact, labs to


                                                 approach WNL.

## 2019-04-28 LAB
ALBUMIN SERPL-MCNC: 3.4 GM/DL (ref 4.2–5.5)
ALBUMIN/GLOB SERPL: 1.3 {RATIO} (ref 1–1.8)
ALP SERPL-CCNC: 43 U/L (ref 34–104)
ALT SERPL-CCNC: 13 U/L (ref 7–52)
ANION GAP SERPL CALC-SCNC: 10.3 MMOL/L (ref 7–16)
AST SERPL-CCNC: 19 U/L (ref 13–39)
BASOPHILS # BLD AUTO: 0 TH/CUMM (ref 0–0.2)
BASOPHILS NFR BLD AUTO: 0.2 % (ref 0–2)
BILIRUB SERPL-MCNC: 0.6 MG/DL (ref 0.3–1)
BUN SERPL-MCNC: 6 MG/DL (ref 7–25)
CALCIUM SERPL-MCNC: 7.8 MG/DL (ref 8.6–10.3)
CHLORIDE SERPL-SCNC: 104 MEQ/L (ref 98–107)
CO2 SERPL-SCNC: 28.6 MEQ/L (ref 21–31)
CREAT SERPL-MCNC: 0.5 MG/DL (ref 0.7–1.3)
EOSINOPHIL # BLD AUTO: 0.4 TH/CMM (ref 0.1–0.4)
EOSINOPHIL NFR BLD AUTO: 6.9 % (ref 0–5)
ERYTHROCYTE [DISTWIDTH] IN BLOOD BY AUTOMATED COUNT: 12.4 % (ref 11.5–20)
GLOBULIN SER-MCNC: 2.6 GM/DL
GLUCOSE SERPL-MCNC: 111 MG/DL (ref 70–105)
HCT VFR BLD CALC: 39.1 % (ref 41–60)
HGB BLD-MCNC: 12.7 GM/DL (ref 12–16)
LYMPHOCYTE AB SER FC-ACNC: 1.9 TH/CMM (ref 1.5–3)
LYMPHOCYTES NFR BLD AUTO: 33.7 % (ref 20–50)
MCH RBC QN AUTO: 30 PG (ref 26–30)
MCHC RBC AUTO-ENTMCNC: 32.5 PG (ref 28–36)
MCV RBC AUTO: 92.4 FL (ref 80–99)
MONOCYTES # BLD AUTO: 0.4 TH/CMM (ref 0.3–1)
MONOCYTES NFR BLD AUTO: 6.4 % (ref 2–10)
NEUTROPHILS # BLD: 2.9 TH/CMM (ref 1.8–8)
NEUTROPHILS NFR BLD AUTO: 52.8 % (ref 40–80)
PLATELET # BLD: 144 TH/CMM (ref 150–400)
PMV BLD AUTO: 6.9 FL
POTASSIUM SERPL-SCNC: 2.9 MEQ/L (ref 3.5–5.1)
RBC # BLD AUTO: 4.24 MIL/CMM (ref 4.3–5.7)
SODIUM SERPL-SCNC: 140 MEQ/L (ref 136–145)
WBC # BLD AUTO: 5.6 TH/CMM (ref 4.8–10.8)

## 2019-04-28 RX ADMIN — Medication SCH EACH: at 10:15

## 2019-04-28 RX ADMIN — POLYETHYLENE GLYCOL 3350 SCH GM: 17 POWDER, FOR SOLUTION ORAL at 16:44

## 2019-04-28 RX ADMIN — DOCUSATE SODIUM SCH MG: 50 LIQUID ORAL at 10:15

## 2019-04-28 RX ADMIN — SODIUM PHOSPHATE, DIBASIC AND SODIUM PHOSPHATE, MONOBASIC SCH: 7; 19 ENEMA RECTAL at 16:40

## 2019-04-28 RX ADMIN — POLYETHYLENE GLYCOL 3350 SCH GM: 17 POWDER, FOR SOLUTION ORAL at 10:15

## 2019-04-28 RX ADMIN — Medication SCH TAB: at 10:15

## 2019-04-28 NOTE — DISCHARGE SUMMARY
General Discharge Summary





- Discharge Summary


Date of Admission: 04/24/19


Admitting Diagnosis: [place] hemesis, Impactation, Leukocytosis, UTI, 

Dehydration, CP


Discharge Date: 04/28/19


Discharge Diagnosis: Impactation resolved, Leukocytosis resolved, UTI resolved, 

Dehydration resolved, CP


Laboratory Findings: 


 Laboratory Results - last 24 hr











  04/28/19 04/28/19





  07:18 07:18


 


WBC  5.6 


 


RBC  4.24 L 


 


Hgb  12.7 


 


Hct  39.1 L 


 


MCV  92.4 


 


MCH  30.0 


 


MCHC Differential  32.5 


 


RDW  12.4 


 


Plt Count  144 L 


 


MPV  6.9 


 


Neutrophils %  52.8 


 


Lymphocytes %  33.7 


 


Monocytes %  6.4 


 


Eosinophils %  6.9 H 


 


Basophils %  0.2 


 


Sodium   140


 


Potassium   2.9 L*


 


Chloride   104


 


Carbon Dioxide   28.6


 


Anion Gap   10.3


 


BUN   6 L


 


Creatinine   0.5 L


 


Est GFR ( Amer)   > 60.0


 


Est GFR (Non-Af Amer)   > 60.0


 


BUN/Creatinine Ratio   12.0


 


Glucose   111 H


 


Calcium   7.8 L


 


Total Bilirubin   0.6


 


AST   19


 


ALT   13


 


Alkaline Phosphatase   43


 


Total Protein   6.0


 


Albumin   3.4 L


 


Globulin   2.6


 


Albumin/Globulin Ratio   1.3











Hospital Course: 


Patient responded to treatment, leukocytosis was resolved, and he had larger 

bowel movements. 


Treatment: 


Patient was started in IV NS, IV AB, Miralax and continue with SNF meds. 


Condition at Discharge: Stable


Disposition: Discharge/Transfered to SNF


Home Medications: 


Home Medication











 Medication  Instructions  Recorded  Type


 


Acetaminophen [Tylenol] 650 mg GT Q4HR PRN  tab 01/28/19 Rx


 


Alendronate Sodium [Fosamax*] 70 mg PO QSAT  tab 01/28/19 Rx


 


Ascorbic Acid [Vitamin C] 500 mg GT DAILY  tab 01/28/19 Rx


 


Bisacodyl [Dulcolax 10 Mg Supp] 10 mg RC Q96H PRN  sup 01/28/19 Rx


 


Calcium Carbonate [Os-Jasvir] 500 mg GT DAILY  tab 01/28/19 Rx


 


Docusate Sodium [Colace] 200 mg GT Q12HR  ud 01/28/19 Rx


 


Fleet Enema 135 ml RC Q96H PRN  btl 01/28/19 Rx


 


Magnesium Hydroxide [Milk of 30 ml GT Q72H PRN  udc 01/28/19 Rx





Magnesia]   


 


Meloxicam [Mobic] 7.5 mg GT BID 01/28/19 Rx


 


Multivitamin w/ Minerals 1 tab GT DAILY  tab 01/28/19 Rx





[Theragran M]   


 


Nitroglycerin [Nitrostat*] 0.4 mg SL Q5MIN PRN  tab 01/28/19 Rx


 


Pantoprazole [Protonix] 40 mg IVP DAILY  vial 01/28/19 Rx


 


Polyethylene Glycol 3350 [Miralax] 17 gm GT BID  pack 01/28/19 Rx


 


Peg Electrolyte Lavage Sol 4,000 ml PO PRN PRN  btl 02/08/19 Rx





[Golytely]   


 


Vitamin D 1,000 iu GT DAILY 04/24/19 History











Inpatient Medications: 


Current Medications





Acetaminophen (Tylenol)  650 mg GT Q4HR PRN


   PRN Reason: Pain Or Fever >99.9


   Stop: 06/23/19 19:22


   Last Admin: 04/27/19 04:15 Dose:  650 mg


Alendronate Sodium (Fosamax)  70 mg PO QSAT [facility]


   Stop: 06/26/19 07:29


   Last Admin: 04/27/19 06:34 Dose:  70 mg


Ascorbic Acid (Vitamin C)  500 mg GT DAILY [facility]


   Stop: 06/24/19 08:59


   Last Admin: 04/28/19 10:15 Dose:  500 mg


Bisacodyl (Dulcolax 10 Mg Supp)  10 mg RC Q24HR PRN


   PRN Reason: MODERATE CONSTIPATION


   Stop: 06/24/19 11:44


Calcium Carbonate (Os-Jasvir)  500 mg GT DAILY [facility]


   Stop: 06/24/19 08:59


   Last Admin: 04/28/19 10:15 Dose:  500 mg


Cholecalciferol (Vitamin D3)  1,000 iu GT DAILY [facility]


   Stop: 06/24/19 08:59


   Last Admin: 04/28/19 10:15 Dose:  1,000 iu


Docusate Sodium (Colace)  200 mg GT Q24HR [facility]


   Stop: 06/24/19 09:44


   Last Admin: 04/28/19 10:15 Dose:  200 mg


Ceftriaxone Sodium 1 gm/ (Sodium Chloride)  50 mls @ 100 mls/hr IV Q24HR [facility]


   Stop: 06/24/19 11:59


   Last Admin: 04/26/19 12:46 Dose:  100 mls/hr


Dextrose (D5w)  1,000 mls @ 100 mls/hr IV .Q10H [facility]


   Stop: 06/25/19 08:59


   Last Admin: 04/28/19 10:16 Dose:  100 mls/hr


Lactobacillus Rhamnosus (Culturelle 15b)  1 each PO DAILY [facility]


   Stop: 06/26/19 08:59


   Last Admin: 04/28/19 10:15 Dose:  1 each


Magnesium Hydroxide (Milk Of Magnesia)  30 ml GT Q24HR PRN


   PRN Reason: Constipation


   Stop: 06/24/19 11:44


Mineral Oil (Mineral Oil 30 Ml)  30 ml PO DAILY PRN


   PRN Reason: Constipation


   Stop: 06/24/19 13:43


Miscellaneous (Probiotic Screen)  1 ea MC PRN PRN


   PRN Reason: PROTOCOL


   Stop: 06/25/19 13:57


Ondansetron HCl (Zofran)  4 mg IV Q8H PRN


   PRN Reason: Nausea / Vomiting


   Stop: 06/23/19 15:56


   Last Admin: 04/27/19 17:23 Dose:  4 mg


Pantoprazole Sodium (Protonix)  40 mg IVP BID [facility]


   Stop: 06/23/19 16:59


   Last Admin: 04/28/19 10:15 Dose:  40 mg


Polyethylene Glycol (Miralax)  17 gm GT BID [facility]


   Stop: 06/24/19 08:59


   Last Admin: 04/28/19 10:15 Dose:  17 gm


Sodium Phosphate (Fleet Enema)  135 ml RC Q24HR [facility]


   Stop: 06/24/19 11:44


   Last Admin: 04/27/19 17:24 Dose:  135 ml








Activity: Bed Rest


Consults and Follow-Up: 


Dashawn Mc [Primary Care Provider] - 


Consulting Speciality: GI (PCP)

## 2019-04-28 NOTE — GI PROGRESS NOTE
Subjective





- Review of Systems


Subjective: 





NO EVENTS





Objective





- Results


Result Diagrams: 


 04/27/19 07:48





 04/27/19 07:48


Recent Labs: 


 Laboratory Last Values











WBC  5.0 Th/cmm (4.8-10.8)   04/27/19  07:48    


 


RBC  4.36 Mil/cmm (4.30-5.70)   04/27/19  07:48    


 


Hgb  13.1 gm/dL (12-16)   04/27/19  07:48    


 


Hct  39.5 % (41.0-60)  L  04/27/19  07:48    


 


MCV  90.5 fl (80-99)   04/27/19  07:48    


 


MCH  30.0 pg (26.0-30.0)   04/27/19  07:48    


 


MCHC Differential  33.2 pg (28.0-36.0)   04/27/19  07:48    


 


RDW  12.3 % (11.5-20.0)   04/27/19  07:48    


 


Plt Count  158 Th/cmm (150-400)   04/27/19  07:48    


 


MPV  6.6 fl  04/27/19  07:48    


 


Add Manual Diff  YES   04/24/19  11:30    


 


Neutrophils %  50.0 % (40.0-80.0)   04/27/19  07:48    


 


Band Neutrophils %  2 % (0-10)   04/24/19  11:30    


 


Lymphocytes %  38.5 % (20.0-50.0)   04/27/19  07:48    


 


Monocytes %  8.8 % (2.0-10.0)   04/27/19  07:48    


 


Eosinophils %  2.2 % (0.0-5.0)   04/27/19  07:48    


 


Basophils %  0.5 % (0.0-2.0)   04/27/19  07:48    


 


Neutrophils (Manual)  89 % (40-80)  H  04/24/19  11:30    


 


Lymphocytes  7 % (20-50)  L  04/24/19  11:30    


 


Monocytes  2 % (2-10)   04/24/19  11:30    


 


Eosinophils  0 % (0-5)   04/24/19  11:30    


 


Basophils  0 % (0-3)   04/24/19  11:30    


 


PT  10.4 SECONDS (9.5-11.5)   04/24/19  11:30    


 


INR  1.00  (0.5-1.4)   04/24/19  11:30    


 


PTT (Actin FS)  27.3 SECONDS (26.0-38.0)   04/24/19  11:30    


 


Sodium  141 mEq/L (136-145)   04/27/19  07:48    


 


Potassium  3.0 mEq/L (3.5-5.1)  L D 04/27/19  07:48    


 


Chloride  105 mEq/L ()   04/27/19  07:48    


 


Carbon Dioxide  25.7 mEq/L (21.0-31.0)   04/27/19  07:48    


 


Anion Gap  13.3  (7.0-16.0)   04/27/19  07:48    


 


BUN  10 mg/dL (7-25)   04/27/19  07:48    


 


Creatinine  0.5 mg/dL (0.7-1.3)  L  04/27/19  07:48    


 


Est GFR ( Amer)  > 60.0 ml/min (>90)   04/27/19  07:48    


 


Est GFR (Non-Af Amer)  > 60.0 ml/min  04/27/19  07:48    


 


BUN/Creatinine Ratio  20.0   04/27/19  07:48    


 


Glucose  118 mg/dL ()  H  04/27/19  07:48    


 


POC Glucose  117 MG/DL (70 - 105)  H  04/24/19  16:49    


 


Calcium  8.3 mg/dL (8.6-10.3)  L  04/27/19  07:48    


 


Total Bilirubin  0.5 mg/dL (0.3-1.0)   04/27/19  07:48    


 


AST  20 U/L (13-39)   04/27/19  07:48    


 


ALT  15 U/L (7-52)   04/27/19  07:48    


 


Alkaline Phosphatase  51 U/L ()   04/27/19  07:48    


 


Total Protein  6.9 gm/dL (6.0-8.3)   04/27/19  07:48    


 


Albumin  4.0 gm/dL (4.2-5.5)  L  04/27/19  07:48    


 


Globulin  2.9 gm/dL  04/27/19  07:48    


 


Albumin/Globulin Ratio  1.4  (1.0-1.8)   04/27/19  07:48    


 


Lipase  19 U/L (11-82)   04/24/19  11:30    


 


TSH  0.39 uIU/ml (0.34-5.60)   04/25/19  06:00    


 


Urine Source  CATH   04/24/19  12:35    


 


Urine Color  YELLOW   04/24/19  12:35    


 


Urine Clarity  CLEAR  (CLEAR)   04/24/19  12:35    


 


Urine pH  6.0  (4.6 - 8.0)   04/24/19  12:35    


 


Ur Specific Gravity  1.025  (1.005-1.030)   04/24/19  12:35    


 


Urine Protein  30 mg/dL (NEGATIVE)  H  04/24/19  12:35    


 


Urine Glucose (UA)  NEGATIVE mg/dL (NEGATIVE)   04/24/19  12:35    


 


Urine Ketones  40 mg/dL (NEGATIVE)  H  04/24/19  12:35    


 


Urine Blood  NEGATIVE  (NEGATIVE)   04/24/19  12:35    


 


Urine Nitrate  NEGATIVE  (NEGATIVE)   04/24/19  12:35    


 


Urine Bilirubin  SMALL  (NEGATIVE)  H  04/24/19  12:35    


 


Urine Urobilinogen  0.2 E.U./dL (0.2 - 1.0)   04/24/19  12:35    


 


Ur Leukocyte Esterase  NEGATIVE  (NEGATIVE)   04/24/19  12:35    


 


Urine RBC  2-5 /hpf (0-5)  H  04/24/19  12:35    


 


Urine WBC  0-2 /hpf (0-5)   04/24/19  12:35    


 


Ur Epithelial Cells  FEW /lpf (FEW)   04/24/19  12:35    


 


Urine Bacteria  FEW /hpf (NONE SEEN)   04/24/19  12:35    


 


Urine Mucus  MODERATE /lpf (FEW)   04/24/19  12:35    














- Physical Exam


Vitals and I&O: 


 Vital Signs











Temp  97.5 F   04/28/19 04:00


 


Pulse  53   04/28/19 04:00


 


Resp  18   04/28/19 04:00


 


BP  96/51   04/28/19 04:00


 


Pulse Ox  98   04/28/19 04:00








 Intake & Output











 04/27/19 04/28/19 04/28/19





 18:59 06:59 18:59


 


Intake Total 1780 110 


 


Balance 1780 110 


 


Weight (lbs) 43.998 kg 43.998 kg 


 


Intake:   


 


  Intake, IV Amount 1000  


 


    Dextrose 5% 1,000 ml @ 1000  





    100 mls/hr IV .Q10H ALISON   





    Rx#:[id number]   


 


  Oral 100 60 


 


  Other 680 50 


 


Other:   


 


  # Voids 1 2 


 


  # Bowel Movements  2 


 


  Stool Characteristics Soft  





 Liquid  





 Brown  


 


  Weight Source Bedscale Bedscale 











Active Medications: 


Current Medications





Acetaminophen (Tylenol)  650 mg GT Q4HR PRN


   PRN Reason: Pain Or Fever >99.9


   Stop: 06/23/19 19:22


   Last Admin: 04/27/19 04:15 Dose:  650 mg


Alendronate Sodium (Fosamax)  70 mg PO QSAT [facility]ISON


   Stop: 06/26/19 07:29


   Last Admin: 04/27/19 06:34 Dose:  70 mg


Ascorbic Acid (Vitamin C)  500 mg GT DAILY [facility]


   Stop: 06/24/19 08:59


   Last Admin: 04/27/19 08:51 Dose:  500 mg


Bisacodyl (Dulcolax 10 Mg Supp)  10 mg RC Q24HR PRN


   PRN Reason: MODERATE CONSTIPATION


   Stop: 06/24/19 11:44


Calcium Carbonate (Os-Jasvir)  500 mg GT DAILY [facility]


   Stop: 06/24/19 08:59


   Last Admin: 04/27/19 08:51 Dose:  500 mg


Cholecalciferol (Vitamin D3)  1,000 iu GT DAILY [facility]


   Stop: 06/24/19 08:59


   Last Admin: 04/27/19 08:51 Dose:  1,000 iu


Docusate Sodium (Colace)  200 mg GT Q24HR [facility]


   Stop: 06/24/19 09:44


   Last Admin: 04/27/19 11:30 Dose:  200 mg


Ceftriaxone Sodium 1 gm/ (Sodium Chloride)  50 mls @ 100 mls/hr IV Q24HR [facility]


   Stop: 06/24/19 11:59


   Last Admin: 04/26/19 12:46 Dose:  100 mls/hr


Dextrose (D5w)  1,000 mls @ 100 mls/hr IV .Q10H [facility]


   Stop: 06/25/19 08:59


   Last Admin: 04/27/19 23:19 Dose:  100 mls/hr


Lactobacillus Rhamnosus (Culturelle 15b)  1 each PO DAILY [facility]


   Stop: 06/26/19 08:59


   Last Admin: 04/27/19 08:51 Dose:  1 each


Magnesium Hydroxide (Milk Of Magnesia)  30 ml GT Q24HR PRN


   PRN Reason: Constipation


   Stop: 06/24/19 11:44


Mineral Oil (Mineral Oil 30 Ml)  30 ml PO DAILY PRN


   PRN Reason: Constipation


   Stop: 06/24/19 13:43


Miscellaneous (Probiotic Screen)  1 ea MC PRN PRN


   PRN Reason: PROTOCOL


   Stop: 06/25/19 13:57


Ondansetron HCl (Zofran)  4 mg IV Q8H PRN


   PRN Reason: Nausea / Vomiting


   Stop: 06/23/19 15:56


   Last Admin: 04/27/19 17:23 Dose:  4 mg


Pantoprazole Sodium (Protonix)  40 mg IVP BID [facility]


   Stop: 06/23/19 16:59


   Last Admin: 04/27/19 17:23 Dose:  40 mg


Polyethylene Glycol (Miralax)  17 gm GT BID [facility]


   Stop: 06/24/19 08:59


   Last Admin: 04/27/19 17:17 Dose:  17 gm


Sodium Phosphate (Fleet Enema)  135 ml RC Q24HR [facility]


   Stop: 06/24/19 11:44


   Last Admin: 04/27/19 17:24 Dose:  135 ml








General: Alert, Other (Patient is non verbal, profound MR)


HEENT: Atraumatic


Neck: Supple


Cardiovascular: Regular rate


Abdomen: Bowel sounds


Extremities: Other (Contracted)


Neurological: Other (Non ambulatory)


Skin: Other (Warm and dry)


Psych/Mental Status: Other (Profound MR)





- Procedures


Procedures: 


 Procedures











Procedure Code Date


 


CHANGE FEEDING DEVICE IN UP INTEST TRACT, EXTERN APPROACH 3C28AUV 08/21/17


 


CHANGE GASTROSTOMY TUBE 97770 08/21/17


 


DIAGNOSTIC COLONOSCOPY 93883 01/12/17


 


EGD BIOPSY SINGLE/MULTIPLE 40032 07/10/14


 


ESOPHAGOGASTRODUODENOSCOPY [EGD] W/CLOSED BIOPSY 45.16 07/10/14


 


INFLUENZA VACCINATION 99.52 01/30/14


 


INSERT INDWELLING CATH 57.94 12/03/12


 


INSERT INTESTINAL TUBE 96.08 12/03/12


 


INSERT TEMP BLADDER CATH 91906 12/03/12


 


INSJ PICC 5 YR+ W/O IMAGING 89096 12/03/12


 


INSPECTION OF LOWER INTESTINAL TRACT, ENDO [id number] 01/12/17


 


OPEN AND OTHER CECECTOMY 45.72 12/03/12


 


OPEN AND OTHER RIGHT HEMICOLECTOMY 45.73 12/03/12


 


PARTIAL REMOVAL OF COLON 03072 12/03/12


 


UNLISTED PX SMALL INTESTINE 48138 12/03/12


 


VENOUS CATHETERIZATION NEC 38.93 12/03/12














Assessment/Plan





- Assessment


Assessment: 





34 YO MALE WITH DYSPHAGIA AND FECAL IMPACTION


HAS GT BUT KRISTAL PO DIET





1.CONT SUPP CARE


2.CONT LAXATIVES

## 2019-05-09 ENCOUNTER — HOSPITAL ENCOUNTER (INPATIENT)
Dept: HOSPITAL 36 - ER | Age: 34
LOS: 5 days | Discharge: NURSING, REHAB OR LONG-TERM CARE | DRG: 247 | End: 2019-05-14
Attending: INTERNAL MEDICINE | Admitting: INTERNAL MEDICINE
Payer: MEDICAID

## 2019-05-09 DIAGNOSIS — K21.9: ICD-10-CM

## 2019-05-09 DIAGNOSIS — K56.7: Primary | ICD-10-CM

## 2019-05-09 DIAGNOSIS — Z93.1: ICD-10-CM

## 2019-05-09 DIAGNOSIS — K59.09: ICD-10-CM

## 2019-05-09 DIAGNOSIS — M19.90: ICD-10-CM

## 2019-05-09 DIAGNOSIS — M81.0: ICD-10-CM

## 2019-05-09 DIAGNOSIS — F73: ICD-10-CM

## 2019-05-09 DIAGNOSIS — R53.2: ICD-10-CM

## 2019-05-09 DIAGNOSIS — G80.9: ICD-10-CM

## 2019-05-09 PROCEDURE — Z7610: HCPCS

## 2019-05-09 NOTE — ED PHYSICIAN CHART
ED Chief Complaint/HPI





- Patient Information


Date Seen:: 05/09/19


Time Seen:: 23:25


Chief Complaint:: abnormal x-ray


History of Present Illness:: 





Patient had a KUB earlier today which was consistent with colonic ileus.  

Patient apparently has routine KUBs about every month because he has a history 

of ileus in the past.  He has apparently been asymptomatic recently in terms of 

his GI tract.


Allergies:: 


 Allergies











Allergy/AdvReac Type Severity Reaction Status Date / Time


 


No Known Allergies Allergy   Verified 04/24/19 10:34











Historian:: EMS


Review:: Transfer documents Reviewed





ED Past Medical History





- Past Medical History


Past Medical History: PUD/GERD, Other (severe mental retardation)


Family History: Other (unavailable)


Social History: Care Facility


Surgical History: other (G-tube)


Psychiatricy History: Other (severe mental retardation)





Family Medical History





- Family Member


  ** Mother


History Unknown: Yes


Ethnicity: Non-


Living Status: Still Living





ED Physical Exam





- Physical Examination


General/Constitutional: Well-developed, well-nourished, Alert, No distress


Other Gen/Cons comments:: 





Nonverbal


Head: Atraumatic


Eyes: Lids, conjuctiva normal, PERRL


Skin: Nl inspection


ENMT: External ears, nose nl


Neck: No nuchal rigidity


Respiratory: Nl effort/Exclusion, Clear to Auscultation, No Wheeze/Rhonchi/Rales


Cardio Vascular: RRR, No murmur, gallop, rubs


GI: No tenderness/rebounding/guarding, No organomegaly, No hernia, Normal BS's, 

Nondistended, No mass/bruits


Extremities: Normal digits & nails





ED Labs/Radiology/EKG Results





- Lab Results


Results: 





 Laboratory Results











WBC  5.2 Th/cmm (4.8-10.8)   05/09/19  00:04    


 


RBC  4.63 Mil/cmm (4.30-5.70)   05/09/19  00:04    


 


Hgb  13.8 gm/dL (12-16)   05/09/19  00:04    


 


Hct  42.3 % (41.0-60)   05/09/19  00:04    


 


MCV  91.3 fl (80-99)   05/09/19  00:04    


 


MCH  29.9 pg (26.0-30.0)   05/09/19  00:04    


 


MCHC Differential  32.7 pg (28.0-36.0)   05/09/19  00:04    


 


RDW  13.1 % (11.5-20.0)   05/09/19  00:04    


 


Plt Count  196 Th/cmm (150-400)   05/09/19  00:04    


 


MPV  6.8 fl  05/09/19  00:04    


 


Neutrophils %  46.6 % (40.0-80.0)   05/09/19  00:04    


 


Lymphocytes %  38.7 % (20.0-50.0)   05/09/19  00:04    


 


Monocytes %  6.0 % (2.0-10.0)   05/09/19  00:04    


 


Eosinophils %  8.1 % (0.0-5.0)  H  05/09/19  00:04    


 


Basophils %  0.6 % (0.0-2.0)   05/09/19  00:04    


 


Sodium  138 mEq/L (136-145)   05/09/19  00:04    


 


Potassium  3.7 mEq/L (3.5-5.1)   05/09/19  00:04    


 


Chloride  104 mEq/L ()   05/09/19  00:04    


 


Carbon Dioxide  26.1 mEq/L (21.0-31.0)   05/09/19  00:04    


 


Anion Gap  11.6  (7.0-16.0)   05/09/19  00:04    


 


BUN  11 mg/dL (7-25)   05/09/19  00:04    


 


Creatinine  0.6 mg/dL (0.7-1.3)  L  05/09/19  00:04    


 


Est GFR ( Amer)  > 60.0 ml/min (>90)   05/09/19  00:04    


 


Est GFR (Non-Af Amer)  > 60.0 ml/min  05/09/19  00:04    


 


BUN/Creatinine Ratio  18.3   05/09/19  00:04    


 


Glucose  85 mg/dL ()   05/09/19  00:04    


 


Calcium  9.3 mg/dL (8.6-10.3)   05/09/19  00:04    


 


Lipase  21 U/L (11-82)   05/09/19  00:04    














- Radiology Results


Results: 





CT scan showed diffuse colonic ileus





ED Septic Shock





- .


Is Septic Shock (SBP<90, OR Lactate>4 mmol\L) present?: No





ED Reassessment (Disposition)





- Reassessment


Reassessment Condition:: Unchanged





- Diagnosis


Diagnosis:: 





colonic ileus





- Patient Disposition


Admitted to:: Med/Surg


Spoke to:: Darien Matos


Admitting Medical Physician:: Darien Matos


Condition at Disposition:: Stable, Unchanged

## 2019-05-10 VITALS — DIASTOLIC BLOOD PRESSURE: 62 MMHG | SYSTOLIC BLOOD PRESSURE: 101 MMHG

## 2019-05-10 LAB
ANION GAP SERPL CALC-SCNC: 11.6 MMOL/L (ref 7–16)
ANION GAP SERPL CALC-SCNC: 18.5 MMOL/L (ref 7–16)
BASOPHILS # BLD AUTO: 0 TH/CUMM (ref 0–0.2)
BASOPHILS # BLD AUTO: 0 TH/CUMM (ref 0–0.2)
BASOPHILS NFR BLD AUTO: 0.5 % (ref 0–2)
BASOPHILS NFR BLD AUTO: 0.6 % (ref 0–2)
BUN SERPL-MCNC: 11 MG/DL (ref 7–25)
BUN SERPL-MCNC: 15 MG/DL (ref 7–25)
CALCIUM SERPL-MCNC: 9.3 MG/DL (ref 8.6–10.3)
CALCIUM SERPL-MCNC: 9.3 MG/DL (ref 8.6–10.3)
CHLORIDE SERPL-SCNC: 104 MEQ/L (ref 98–107)
CHLORIDE SERPL-SCNC: 104 MEQ/L (ref 98–107)
CO2 SERPL-SCNC: 22.2 MEQ/L (ref 21–31)
CO2 SERPL-SCNC: 26.1 MEQ/L (ref 21–31)
CREAT SERPL-MCNC: 0.5 MG/DL (ref 0.7–1.3)
CREAT SERPL-MCNC: 0.6 MG/DL (ref 0.7–1.3)
EOSINOPHIL # BLD AUTO: 0.3 TH/CMM (ref 0.1–0.4)
EOSINOPHIL # BLD AUTO: 0.4 TH/CMM (ref 0.1–0.4)
EOSINOPHIL NFR BLD AUTO: 5.1 % (ref 0–5)
EOSINOPHIL NFR BLD AUTO: 8.1 % (ref 0–5)
ERYTHROCYTE [DISTWIDTH] IN BLOOD BY AUTOMATED COUNT: 12.9 % (ref 11.5–20)
ERYTHROCYTE [DISTWIDTH] IN BLOOD BY AUTOMATED COUNT: 13.1 % (ref 11.5–20)
GLUCOSE SERPL-MCNC: 60 MG/DL (ref 70–105)
GLUCOSE SERPL-MCNC: 85 MG/DL (ref 70–105)
HCT VFR BLD CALC: 42.3 % (ref 41–60)
HCT VFR BLD CALC: 42.4 % (ref 41–60)
HGB BLD-MCNC: 13.8 GM/DL (ref 12–16)
HGB BLD-MCNC: 14.2 GM/DL (ref 12–16)
LIPASE SERPL-CCNC: 21 U/L (ref 11–82)
LYMPHOCYTE AB SER FC-ACNC: 2 TH/CMM (ref 1.5–3)
LYMPHOCYTE AB SER FC-ACNC: 2.2 TH/CMM (ref 1.5–3)
LYMPHOCYTES NFR BLD AUTO: 38.7 % (ref 20–50)
LYMPHOCYTES NFR BLD AUTO: 41.4 % (ref 20–50)
MAGNESIUM SERPL-MCNC: 2.2 MG/DL (ref 1.9–2.7)
MCH RBC QN AUTO: 29.9 PG (ref 26–30)
MCH RBC QN AUTO: 30.8 PG (ref 26–30)
MCHC RBC AUTO-ENTMCNC: 32.7 PG (ref 28–36)
MCHC RBC AUTO-ENTMCNC: 33.5 PG (ref 28–36)
MCV RBC AUTO: 91.3 FL (ref 80–99)
MCV RBC AUTO: 91.9 FL (ref 80–99)
MONOCYTES # BLD AUTO: 0.3 TH/CMM (ref 0.3–1)
MONOCYTES # BLD AUTO: 0.3 TH/CMM (ref 0.3–1)
MONOCYTES NFR BLD AUTO: 4.8 % (ref 2–10)
MONOCYTES NFR BLD AUTO: 6 % (ref 2–10)
NEUTROPHILS # BLD: 2.5 TH/CMM (ref 1.8–8)
NEUTROPHILS # BLD: 2.5 TH/CMM (ref 1.8–8)
NEUTROPHILS NFR BLD AUTO: 46.6 % (ref 40–80)
NEUTROPHILS NFR BLD AUTO: 48.2 % (ref 40–80)
PLATELET # BLD: 196 TH/CMM (ref 150–400)
PLATELET # BLD: 211 TH/CMM (ref 150–400)
POTASSIUM SERPL-SCNC: 3.7 MEQ/L (ref 3.5–5.1)
POTASSIUM SERPL-SCNC: 3.7 MEQ/L (ref 3.5–5.1)
RBC # BLD AUTO: 4.61 MIL/CMM (ref 4.3–5.7)
RBC # BLD AUTO: 4.63 MIL/CMM (ref 4.3–5.7)
SODIUM SERPL-SCNC: 138 MEQ/L (ref 136–145)
SODIUM SERPL-SCNC: 141 MEQ/L (ref 136–145)
WBC # BLD AUTO: 5.2 TH/CMM (ref 4.8–10.8)
WBC # BLD AUTO: 5.3 TH/CMM (ref 4.8–10.8)

## 2019-05-10 RX ADMIN — HEPARIN SODIUM SCH UNITS: 5000 INJECTION, SOLUTION INTRAVENOUS; SUBCUTANEOUS at 20:58

## 2019-05-10 RX ADMIN — DOCUSATE SODIUM SCH MG: 50 LIQUID ORAL at 08:28

## 2019-05-10 RX ADMIN — DEXTROSE AND SODIUM CHLORIDE SCH MLS/HR: 5; .45 INJECTION, SOLUTION INTRAVENOUS at 10:12

## 2019-05-10 NOTE — HISTORY & PHYSICAL
ADMIT DATE:     05/10/2019



CHIEF COMPLAINT:  Recurrent constipation and abnormal abdominal x-ray.



HISTORY OF PRESENT ILLNESS:  This is a 33-year-old gentleman who lives at [facility] who has been admitted in the past for similar episodes of ileus and

constipation, presented last night to the ER with couple of day history of

constipation and possibly abdominal discomfort.  Apparently, there was a KUB

done at the facility where he resides that showed moderate colonic ileus.  The

patient was then referred for evaluation to the ER for further management and

care.  Given his previous episodes of ileus and fecal impaction/stasis, he has

been admitted to the medical floor for further management and care.  There is no

other history available at this time as the patient is nonverbal, but he is

currently asleep and appears to be comfortable.



PAST MEDICAL HISTORY:  Profound intellectual disability, cerebral palsy. 

History of acid reflux disease, history of osteoarthritis, osteoporosis and

chronic constipation.



PAST SURGICAL HISTORY:  Includes PEG.



FAMILY HISTORY:  Likely noncontributory to this admission.



SOCIAL HISTORY:  No tobacco, ETOH or illicit drug usage.  He lives at [facility] under the care of Dr. Mc.



ALLERGIES:  NKDA.



OUTPATIENT MEDICATIONS:  Tylenol 650 q.4 p.r.n. for pain, Fosamax 70 mg every

Saturday, ascorbic acid 100 mg q. daily, Dulcolax 10 mg per rectum q.24 hours

p.r.n. for severe constipation.  Calcium carbonate 500 mg every day, docusate

sodium 200 mg via G-tube q.24 hours.  Fleet enema 135 mL q.24 hours for severe

constipation.  Culturelle 1 cap every day, magnesium or milk of magnesia 30 mL

q.72 hours. Meloxicam 7.5 b.i.d., mineral oil 30 mL q. daily, multivitamins and

minerals q. daily, nitroglycerin sublingual 0.4  p.r.n. for chest pain, Protonix

40 mg IV b.i.d.,  MiraLax 17 grams b.i.d.



REVIEW OF SYSTEMS:  Unable to be done given the patient's condition, but per

records, there is no recent history of fever, chills, weight loss, abdominal

pain, nausea, vomiting or diarrhea. +constipation.



PHYSICAL EXAMINATION:

VITAL SIGNS:  Temperature 98.4, pulse 52, respirations 18, /62, satting

99% on room air.

GENERAL:  He is a well-nourished, developmentally delayed gentleman, currently

asleep, appears to be comfortable, nontoxic appearing.

HEAD:  Normocephalic, atraumatic.

NECK:  There is no JVD or LAD.

CARDIOVASCULAR:  Regular rate, colin, but no murmurs noted.

LUNGS:  Clear to auscultation bilaterally.

ABDOMEN:  Soft, supple, mildly distended, but nontender, no masses palpated. 

There are no peritoneal signs, and currently, there are normoactive bowel

sounds.  G-tube in place.

EXTREMITIES:  On lower extremities, there is no pedal edema.



LABORATORY DATA:  CBC was essentially within normal limits.  Chemistry was

within normal limits.  Glucose 60.



ASSESSMENT:

1.  Recurrent abdominal distention, likely secondary to ileus versus fecal

     stasis/impaction.

2.  History of chronic constipation with previous multiple admissions for ileus.

3.  History of acid reflux disease.

4.  History of dysphagia, status post PEG placement.

5.  History of osteoarthritis.

6.  History of osteoporosis.

7.  History of profound mental disability.



PLAN:  The patient has been admitted to the medical floor for further management

and care.  He has been placed on IV fluids.  His G-tube feeds have been stopped

in the interim, and he has received 1 Fleet enema since admission.  He will be

placed on a bowel regimen including, Dulcolax, docusate sodium as well as a

mineral oil enema and polyethylene glycol, which he already has been on at the

group home.  If these modalities do not improve, I will ask for a GI consult for

further management and care.





DD: 05/10/2019 08:05

DT: 05/10/2019 08:35

JOB# [id number]  [id number]

ROSANNA

## 2019-05-10 NOTE — DIAGNOSTIC IMAGING REPORT
Exam: CT examination of pelvis.



HISTORY: Colonic ileus



Total DLP equals 433



CTDI equals 8.3



Findings:



Multiple views of the section of the abdomen pelvis obtained from lower

thorax to pubic symphysis without the administration of oral or

intravenous contrast material, the study compared to the prior

examination of 04/24/2019



The study demonstrates normal aeration of lung parenchyma the bases. 

There is evidence for significant air fluid distention small bowel large

bowel loops.  The findings suggestive of diffuse apparently curious. 

The visualized liver parenchyma spleen are intact.  The kidneys

demonstrate no evidence of obstructive uropathy nephrolithiasis. 

Gastrostomy tube in the stomach.  Large amount of fecal content is noted

throughout the colon.  Urinary bladder is intact.  The bladder wall

somewhat thickened.  Air-fluid level is noted in the rectum.



IMPRESSION:



Severe distention of small bowel and large bowel loops air-fluid levels.



Moderate amount fecal content throughout the colon



The colon is severely dilated.



Gastrostomy tube in the stomach.



Uterine bladder wall thickening.  Cystitis cannot be excluded.

## 2019-05-10 NOTE — DIAGNOSTIC IMAGING REPORT
KUB single view



HISTORY: Ileus, constipation



COMPARISON: CT abdomen and pelvis performed on 5/10/2019



FINDINGS: There is severe generalized gaseous distended loops of bowel. 

Mild to moderate stool is noted greatest within the right colon and

transverse colon.  Percutaneous gastric feeding tube is noted.  There is

evidence of hip dysplasia bilaterally.



IMPRESSION:



Severe generalized gaseous distended loops of bowel which may be due to

severe ileus.  Moderate stool is noted greatest within the right colon

and transverse colon better seen on recent CT exam.

## 2019-05-11 LAB
ANION GAP SERPL CALC-SCNC: 15.7 MMOL/L (ref 7–16)
BASOPHILS # BLD AUTO: 0 TH/CUMM (ref 0–0.2)
BASOPHILS NFR BLD AUTO: 0.4 % (ref 0–2)
BUN SERPL-MCNC: 18 MG/DL (ref 7–25)
CALCIUM SERPL-MCNC: 8.7 MG/DL (ref 8.6–10.3)
CHLORIDE SERPL-SCNC: 105 MEQ/L (ref 98–107)
CO2 SERPL-SCNC: 22.3 MEQ/L (ref 21–31)
CREAT SERPL-MCNC: 0.6 MG/DL (ref 0.7–1.3)
EOSINOPHIL # BLD AUTO: 0.1 TH/CMM (ref 0.1–0.4)
EOSINOPHIL NFR BLD AUTO: 2.5 % (ref 0–5)
ERYTHROCYTE [DISTWIDTH] IN BLOOD BY AUTOMATED COUNT: 12.8 % (ref 11.5–20)
GLUCOSE SERPL-MCNC: 103 MG/DL (ref 70–105)
HCT VFR BLD CALC: 41.5 % (ref 41–60)
HGB BLD-MCNC: 13.7 GM/DL (ref 12–16)
LYMPHOCYTE AB SER FC-ACNC: 2 TH/CMM (ref 1.5–3)
LYMPHOCYTES NFR BLD AUTO: 39.7 % (ref 20–50)
MCH RBC QN AUTO: 30.4 PG (ref 26–30)
MCHC RBC AUTO-ENTMCNC: 33 PG (ref 28–36)
MCV RBC AUTO: 92.1 FL (ref 80–99)
MONOCYTES # BLD AUTO: 0.3 TH/CMM (ref 0.3–1)
MONOCYTES NFR BLD AUTO: 5.7 % (ref 2–10)
NEUTROPHILS # BLD: 2.7 TH/CMM (ref 1.8–8)
NEUTROPHILS NFR BLD AUTO: 51.7 % (ref 40–80)
PLATELET # BLD: 207 TH/CMM (ref 150–400)
POTASSIUM SERPL-SCNC: 4 MEQ/L (ref 3.5–5.1)
RBC # BLD AUTO: 4.51 MIL/CMM (ref 4.3–5.7)
SODIUM SERPL-SCNC: 139 MEQ/L (ref 136–145)
WBC # BLD AUTO: 5.1 TH/CMM (ref 4.8–10.8)

## 2019-05-11 RX ADMIN — DOCUSATE SODIUM SCH MG: 50 LIQUID ORAL at 09:07

## 2019-05-11 RX ADMIN — HEPARIN SODIUM SCH UNITS: 5000 INJECTION, SOLUTION INTRAVENOUS; SUBCUTANEOUS at 09:07

## 2019-05-11 RX ADMIN — HEPARIN SODIUM SCH UNITS: 5000 INJECTION, SOLUTION INTRAVENOUS; SUBCUTANEOUS at 20:43

## 2019-05-11 RX ADMIN — DEXTROSE AND SODIUM CHLORIDE SCH MLS/HR: 5; .45 INJECTION, SOLUTION INTRAVENOUS at 12:35

## 2019-05-11 RX ADMIN — DEXTROSE AND SODIUM CHLORIDE SCH MLS/HR: 5; .45 INJECTION, SOLUTION INTRAVENOUS at 00:00

## 2019-05-11 NOTE — CONSULTATION
DATE OF CONSULTATION:  05/11/2019



REASON FOR CONSULTATION:  Fecal impaction.



HISTORY OF PRESENT ILLNESS:  This consult was obtained through the courtesy of

Dr. Matos for this 33-year-old mentally challenged with history of dysphagia,

status post G-tube, cerebral palsy as well, admitted to the hospital for

abdominal distention and abdominal pain, found to have fecal impaction and GI

consult was called in for further evaluation.  The patient's mother is with him.

 She is very involved.  After examined, she stated that usually have a bowel

movement every day, but it starts sometimes, it become less than usual and then

he has a backup and then end up with constipation and fecal impaction.



PAST MEDICAL HISTORY:  Developmental delay, cerebral palsy, GERD,

osteoarthritis, osteoporosis, chronic constipation.



PAST SURGICAL HISTORY:  PEG.



SOCIAL HISTORY:  Nonsmoker, alcoholic, IV drug abuser.



FAMILY HISTORY:  Noncontributory.



ALLERGIES:  No known drug allergies.



MEDICATIONS:  The patient is on bisacodyl, Colace, subcutaneous heparin, mineral

oil p.r.n.



REVIEW OF SYSTEMS:  Unobtainable.



PHYSICAL EXAMINATION:

GENERAL:  The patient is awake, somewhat responsive, not oriented, no acute

distress.

VITAL SIGNS:  Blood pressure 106/65, heart rate 56, respiratory rate 18,

temperature 99.0.

HEAD AND NECK:  Pupils reactive to light.  Extraocular muscles could not be

tested.  Oral cavity no lesion.

NECK:  Supple.

CHEST:  Good air entry.

LUNGS:  Clear to auscultation.

CARDIOVASCULAR:  Showed regular rate and rhythm.  No murmur or gallop.

ABDOMEN:  Soft, positive bowel sound.  There is a G-tube in place.  Bowel sounds

are present.

EXTREMITIES:  Lower extremities, no edema.

CENTRAL NERVOUS SYSTEM:  Unable to evaluate.



LABORATORY DATA:  CBC unremarkable, so was chemistry, but no liver enzymes were

done.  CAT scan showed gastric distention with the intestine.



IMPRESSION:  A 33-year-old with recurrent fecal impaction, abdominal distention.



ASSESSMENT AND PLAN:

1.  Recurrent fecal impaction with chronic constipation.  We will give the

patient one dose of mineral oil enema now, then we will give a bottle of mag

citrate, then we will increase mineral oil to twice daily on a regular basis,

then further recommendations to follow.



OTHER MEDICAL PROBLEM:  As per Dr. Matos.



ADDENDUM TO THE PAST SURGICAL HISTORY:  The patient had an exploratory

laparotomy supposed to be for bowel obstruction, but turned out to be just

according to the mother twist of small intestine, which was released and he did

better after that.





DD: 05/11/2019 11:47

DT: 05/11/2019 22:36

[facility]# [id number]  [id number]

## 2019-05-11 NOTE — INTERNAL MEDICINE PROG NOTE
Internal Medicine Subjective





- Subjective


Service Date: 19 (NO BM'S REPORTED. D/W GI)


Patient seen and examined:: with staff


Patient is:: awake


Patient Complaints of:: constipation


Per staff patient has:: no adverse event





Internal Medicine Objective





- Results


Result Diagrams: 


 19 06:05





 19 06:05


Recent Labs: 


 Laboratory Last Values











WBC  5.1 Th/cmm (4.8-10.8)   19  06:05    


 


RBC  4.51 Mil/cmm (4.30-5.70)   19  06:05    


 


Hgb  13.7 gm/dL (12-16)   19  06:05    


 


Hct  41.5 % (41.0-60)   19  06:05    


 


MCV  92.1 fl (80-99)   19  06:05    


 


MCH  30.4 pg (26.0-30.0)  H  19  06:05    


 


MCHC Differential  33.0 pg (28.0-36.0)   19  06:05    


 


RDW  12.8 % (11.5-20.0)   19  06:05    


 


Plt Count  207 Th/cmm (150-400)   19  06:05    


 


MPV  7.0 fl  19  06:05    


 


Neutrophils %  51.7 % (40.0-80.0)   19  06:05    


 


Lymphocytes %  39.7 % (20.0-50.0)   19  06:05    


 


Monocytes %  5.7 % (2.0-10.0)   19  06:05    


 


Eosinophils %  2.5 % (0.0-5.0)   19  06:05    


 


Basophils %  0.4 % (0.0-2.0)   19  06:05    


 


Sodium  139 mEq/L (136-145)   19  06:05    


 


Potassium  4.0 mEq/L (3.5-5.1)   19  06:05    


 


Chloride  105 mEq/L ()   19  06:05    


 


Carbon Dioxide  22.3 mEq/L (21.0-31.0)   19  06:05    


 


Anion Gap  15.7  (7.0-16.0)   19  06:05    


 


BUN  18 mg/dL (7-25)   19  06:05    


 


Creatinine  0.6 mg/dL (0.7-1.3)  L  19  06:05    


 


Est GFR ( Amer)  > 60.0 ml/min (>90)   19  06:05    


 


Est GFR (Non-Af Amer)  > 60.0 ml/min  19  06:05    


 


BUN/Creatinine Ratio  30.0   19  06:05    


 


Glucose  103 mg/dL ()   19  06:05    


 


POC Glucose  56 MG/DL (70 - 105)  L  05/10/19  04:09    


 


Calcium  8.7 mg/dL (8.6-10.3)   19  06:05    


 


Magnesium  2.2 mg/dL (1.9-2.7)   05/10/19  05:35    


 


Lipase  21 U/L (11-82)   19  00:04    














- Physical Exam


Vitals and I&O: 


 Vital Signs











Temp  97.2 F   19 07:52


 


Pulse  56   19 07:52


 


Resp  18   19 07:52


 


BP  106/65   19 07:52


 


Pulse Ox  97   19 07:52








 Intake & Output











 05/10/19 05/11/19 05/11/19





 18:59 06:59 18:59


 


Intake Total  1200 


 


Balance  1200 


 


Weight (lbs) 46.72 kg 46.72 kg 


 


Intake:   


 


  Intake, IV Amount  1000 


 


    D5-0.45NS 1,000 ml @ 75  1000 





    mls/hr IV .[id number] [facility]   





    #:[id number]   


 


  Other  200 


 


Other:   


 


  # Voids 3 2 


 


  Weight Source Bedscale Bedscale 











Active Medications: 


Current Medications





Bisacodyl (Dulcolax 10 Mg Supp)  10 mg RC Q24HR PRN


   PRN Reason: MODERATE CONSTIPATION


   Stop: 19 08:00


   Last Admin: 05/10/19 18:34 Dose:  10 mg


Docusate Sodium (Colace)  200 mg GT DAILY [facility]


   Stop: 19 08:59


   Last Admin: 19 09:07 Dose:  200 mg


Heparin Sodium (Porcine) (Heparin)  5,000 units SUBQ Q12H [facility]


   Stop: 19 20:59


   Last Admin: 19 09:07 Dose:  5,000 units


Dextrose/Sodium Chloride (D5-0.45ns)  1,000 mls @ 75 mls/hr IV .[id number] [facility]


   Stop: 19 02:58


   Last Admin: 19 00:00 Dose:  75 mls/hr


Magnesium Citrate (Citroma)  17.5 gm GT X1 ONE


   Stop: 19 11:18


Mineral Oil (Fleet Mineral Oil)  118 ml RC X1 ONE


   Stop: 19 11:18


Mineral Oil (Mineral Oil 30 Ml)  30 ml GT BID [facility]


   Stop: 07/10/19 16:59


Miscellaneous (Vte Chemical Prophylaxis Screen/ Admission)  1 ea [facility] PRN PRN


   PRN Reason: PROTOCOL


   Stop: 19 13:20


Sodium Phosphate (Fleet Enema)  135 ml RC Q24HR PRN


   PRN Reason: SEVERE CONSTIPATION


   Stop: 19 08:14


   Last Admin: 05/10/19 16:15 Dose:  135 ml








HEENT: NC/AT, PERRLA, EOMI


Neck: Supple, No JVD


Lungs: CTAB


Cardiovascular: RRR, Normal S1, without murmur


Abdomen: soft, non-tender, non-distended, +GT, positive bowel sound


Extremities: clear





- Procedures


Procedures: 


 Procedures











Procedure Code Date


 


CHANGE FEEDING DEVICE IN UP INTEST TRACT, EXTERN APPROACH [id number] 17


 


CHANGE GASTROSTOMY TUBE 91489 17


 


DIAGNOSTIC COLONOSCOPY 11419 17


 


EGD BIOPSY SINGLE/MULTIPLE 27655 07/10/14


 


ESOPHAGOGASTRODUODENOSCOPY [EGD] W/CLOSED BIOPSY 45.16 07/10/14


 


INFLUENZA VACCINATION 99.52 14


 


INSERT INDWELLING CATH 57.94 12


 


INSERT INTESTINAL TUBE 96.08 12


 


INSERT TEMP BLADDER CATH 66492 12


 


[facility] PICC 5 YR+ W/O IMAGING 07359 12


 


INSPECTION OF LOWER INTESTINAL TRACT, ENDO [id number] 17


 


OPEN AND OTHER CECECTOMY 45.72 12


 


OPEN AND OTHER RIGHT HEMICOLECTOMY 45.73 12


 


PARTIAL REMOVAL OF COLON 40378 12


 


UNLISTED PX SMALL INTESTINE 73982 12


 


VENOUS CATHETERIZATION NEC 38.93 12








KUB-SEVERE GENERILIZED GASEOUS DISTENDED LOOPS OF BOWEL





Internal Medicine Assmt/Plan





- Assessment


Assessment: 





RECURRENT ABD DISTENTION 2RY TO CONSTIPATION/ILEUS


CONTIPATION/ILEUS


HX OF DYSPHAGIA-S/P PEG


HX OF INTELLECTUAL DISABILITY


HX OF OP





- Plan


Plan: 





CONT WITH CURRENT SUPPORTIVE CARE AND MGT


CONT WITH IVF/PEG FEEDS ON HOLD


CONT WITH MINERAL OIL ENEMA BID, DULCOLAX, DSS


FLEET ENEMA PRN


GI EVAL NOTED/APPRECIATED





Nutritional Asmnt/Malnutr-PDOC





- Dietary Evaluation


Malnutrition Findings (Please click <Entered> for more info): 








Nutritional Asmnt/Malnutrition                             Start:  05/10/19 15:

51


Text:                                                      Status: Complete    

  


Freq:                                                                          

  


Protocol:                                                                      

  


 Document     05/10/19 15:51  LCHENG  (Rec: 05/10/19 15:58  LCHENG  [id number])


 Nutritional Asmnt/Malnutrition


     Patient General Information


      Nutritional Screening                      High Risk


      Diagnosis                                  constipation


      Pertinent Medical Hx/Surgical Hx           PUD/GERD, MR, Gtube


      Subjective Information                     Pt seen asleep in bed at time


                                                 of visit. NPO status.


      Current Diet Order/ Nutrition Support      NPO


      Pertinent Medications                      D5-0.45ns, colace, mineral oil


      Pertinent Labs                             5/10 Cr 0.5, glucose 60


     Nutritional Hx/Data


      Height                                     1.68 m


      Height (Calculated Centimeters)            167.6


      Current Weight (lbs)                       46.72 kg


      Weight (Calculated Kilograms)              46.7


      Weight (Calculated Grams)                  21717.0


      Ideal Body Weight                          142


      Body Mass Index (BMI)                      16.6


      Weight Status                              Underweight


     GI Symptoms


      GI Symptoms                                Constipation


      Last BM                                    not indicated


      Difficult in:                              None


      Skin Integrity/Comment:                    intact


     Estimated Nutritional Goals


      BEE in Kcals:                              Using Current wt


      Calories/Kcals/Kg                          30-35


      Kcals Calculated                           3565-4925


      Protein:                                   Using Current wt


      Protein g/k.2-1.4


      Protein Calculated                         56-66


      Fluid: ml                                  1410-1645ml (1m/kcal)


     Nutritional Problem


      1. Problem


       Problem                                   altered GI function


       Etiology                                  possible GI dysfunction


       Signs/Symptoms:                           constipation


     Intervention/Recommendation


      Comments                                   1. Monitor NPO status. Resume


                                                 TF as tolerated when medically


                                                 appropriate. Consider PPN if


                                                 TF not tolerated.


                                                 2. Monitor GI function, wt,


                                                 labs and skin integrity


                                                 3. F/U as high risk in 2-3


                                                 days


     Expected Outcomes/Goals


      Expected Outcomes/Goals                    1. Pt to meet at least 90% of


                                                 nutritional needs via


                                                 nutrition support with


                                                 tolerance


                                                 2. Wt stability, GI function


                                                 to improve, skin to remain


                                                 intact, labs to approach WNL.

## 2019-05-12 RX ADMIN — DEXTROSE AND SODIUM CHLORIDE SCH MLS/HR: 5; .45 INJECTION, SOLUTION INTRAVENOUS at 01:15

## 2019-05-12 RX ADMIN — DOCUSATE SODIUM SCH MG: 50 LIQUID ORAL at 09:46

## 2019-05-12 RX ADMIN — HEPARIN SODIUM SCH UNITS: 5000 INJECTION, SOLUTION INTRAVENOUS; SUBCUTANEOUS at 09:47

## 2019-05-12 RX ADMIN — HEPARIN SODIUM SCH UNITS: 5000 INJECTION, SOLUTION INTRAVENOUS; SUBCUTANEOUS at 20:46

## 2019-05-12 RX ADMIN — DEXTROSE AND SODIUM CHLORIDE SCH MLS/HR: 5; .45 INJECTION, SOLUTION INTRAVENOUS at 14:45

## 2019-05-12 NOTE — GI PROGRESS NOTE
Subjective





- Review of Systems


Service Date: 05/12/19


Events since last encounter: 





no events


Subjective: 





Big BM yesterday





Objective





- Results


Result Diagrams: 


 05/11/19 06:05





 05/11/19 06:05


Recent Labs: 


 Laboratory Last Values











WBC  5.1 Th/cmm (4.8-10.8)   05/11/19  06:05    


 


RBC  4.51 Mil/cmm (4.30-5.70)   05/11/19  06:05    


 


Hgb  13.7 gm/dL (12-16)   05/11/19  06:05    


 


Hct  41.5 % (41.0-60)   05/11/19  06:05    


 


MCV  92.1 fl (80-99)   05/11/19  06:05    


 


MCH  30.4 pg (26.0-30.0)  H  05/11/19  06:05    


 


MCHC Differential  33.0 pg (28.0-36.0)   05/11/19  06:05    


 


RDW  12.8 % (11.5-20.0)   05/11/19  06:05    


 


Plt Count  207 Th/cmm (150-400)   05/11/19  06:05    


 


MPV  7.0 fl  05/11/19  06:05    


 


Neutrophils %  51.7 % (40.0-80.0)   05/11/19  06:05    


 


Lymphocytes %  39.7 % (20.0-50.0)   05/11/19  06:05    


 


Monocytes %  5.7 % (2.0-10.0)   05/11/19  06:05    


 


Eosinophils %  2.5 % (0.0-5.0)   05/11/19  06:05    


 


Basophils %  0.4 % (0.0-2.0)   05/11/19  06:05    


 


Sodium  139 mEq/L (136-145)   05/11/19  06:05    


 


Potassium  4.0 mEq/L (3.5-5.1)   05/11/19  06:05    


 


Chloride  105 mEq/L ()   05/11/19  06:05    


 


Carbon Dioxide  22.3 mEq/L (21.0-31.0)   05/11/19  06:05    


 


Anion Gap  15.7  (7.0-16.0)   05/11/19  06:05    


 


BUN  18 mg/dL (7-25)   05/11/19  06:05    


 


Creatinine  0.6 mg/dL (0.7-1.3)  L  05/11/19  06:05    


 


Est GFR ( Amer)  > 60.0 ml/min (>90)   05/11/19  06:05    


 


Est GFR (Non-Af Amer)  > 60.0 ml/min  05/11/19  06:05    


 


BUN/Creatinine Ratio  30.0   05/11/19  06:05    


 


Glucose  103 mg/dL ()   05/11/19  06:05    


 


POC Glucose  107 MG/DL (70 - 105)  H  05/11/19  17:42    


 


Calcium  8.7 mg/dL (8.6-10.3)   05/11/19  06:05    


 


Magnesium  2.2 mg/dL (1.9-2.7)   05/10/19  05:35    


 


Lipase  21 U/L (11-82)   05/09/19  00:04    














- Physical Exam


Vitals and I&O: 


 Vital Signs











Temp  96.7 F   05/12/19 08:00


 


Pulse  54   05/12/19 08:00


 


Resp  18   05/12/19 08:00


 


BP  105/77   05/12/19 08:00


 


Pulse Ox  100   05/12/19 08:00








 Intake & Output











 05/11/19 05/12/19 05/12/19





 18:59 06:59 18:59


 


Intake Total 1343.75 950 


 


Balance 1343.75 950 


 


Weight (lbs) 46.72 kg  46.72 kg


 


Intake:   


 


  Intake, IV Amount 943.75 950 


 


    D5-0.45NS 1,000 ml @ 75 943.75 950 





    mls/hr IV .[id number] [facility]   





    #:[id number]   


 


  Other 400  


 


Other:   


 


  # Voids 2  


 


  # Bowel Movements 0  


 


  Weight Source Bedscale  Bedscale











Active Medications: 


Current Medications





Bisacodyl (Dulcolax 10 Mg Supp)  10 mg RC Q24HR PRN


   PRN Reason: MODERATE CONSTIPATION


   Stop: 07/09/19 08:00


   Last Admin: 05/11/19 20:43 Dose:  10 mg


Docusate Sodium (Colace)  200 mg GT DAILY [facility]


   Stop: 07/09/19 08:59


   Last Admin: 05/12/19 09:46 Dose:  200 mg


Heparin Sodium (Porcine) (Heparin)  5,000 units SUBQ Q12H [facility]


   Stop: 07/09/19 20:59


   Last Admin: 05/12/19 09:47 Dose:  5,000 units


Dextrose/Sodium Chloride (D5-0.45ns)  1,000 mls @ 75 mls/hr IV .[id number] [facility]


   Stop: 07/09/19 02:58


   Last Admin: 05/12/19 01:15 Dose:  75 mls/hr


Mineral Oil (Mineral Oil 30 Ml)  30 ml GT BID [facility]


   Stop: 07/10/19 16:59


   Last Admin: 05/12/19 09:46 Dose:  30 ml


Miscellaneous (Vte Chemical Prophylaxis Screen/ Admission)  1 ea MC PRN PRN


   PRN Reason: PROTOCOL


   Stop: 07/09/19 13:20


Sodium Phosphate (Fleet Enema)  135 ml RC Q24HR PRN


   PRN Reason: SEVERE CONSTIPATION


   Stop: 07/09/19 08:14


   Last Admin: 05/10/19 16:15 Dose:  135 ml








General: Alert, No acute distress


Cardiovascular: Regular rate, Normal S1, Normal S2


Lungs: Clear to auscultation


Abdomen: Bowel sounds, Soft, Other (G tube and surgical scar), no Tender, no 

Mass





- Procedures


Procedures: 


 Procedures











Procedure Code Date


 


CHANGE FEEDING DEVICE IN UP INTEST TRACT, EXTERN APPROACH [id number] 08/21/17


 


CHANGE GASTROSTOMY TUBE 19901 08/21/17


 


DIAGNOSTIC COLONOSCOPY 21337 01/12/17


 


EGD BIOPSY SINGLE/MULTIPLE 80135 07/10/14


 


ESOPHAGOGASTRODUODENOSCOPY [EGD] W/CLOSED BIOPSY 45.16 07/10/14


 


INFLUENZA VACCINATION 99.52 01/30/14


 


INSERT INDWELLING CATH 57.94 12/03/12


 


INSERT INTESTINAL TUBE 96.08 12/03/12


 


INSERT TEMP BLADDER CATH 30942 12/03/12


 


[facility] PICC 5 YR+ W/O IMAGING 88735 12/03/12


 


INSPECTION OF LOWER INTESTINAL TRACT, ENDO [id number] 01/12/17


 


OPEN AND OTHER CECECTOMY 45.72 12/03/12


 


OPEN AND OTHER RIGHT HEMICOLECTOMY 45.73 12/03/12


 


PARTIAL REMOVAL OF COLON 40219 12/03/12


 


UNLISTED PX SMALL INTESTINE 25117 12/03/12


 


VENOUS CATHETERIZATION NEC 38.93 12/03/12














Assessment/Plan





- Assessment


Assessment: 





Fecal impaction


Chronic constipation





- Plan


Plan: 





1.Fecal impaction


Good response to mag citrate.





Chronic constipation


OPT daily mineral oil or MOM

## 2019-05-13 RX ADMIN — DEXTROSE AND SODIUM CHLORIDE SCH MLS/HR: 5; .45 INJECTION, SOLUTION INTRAVENOUS at 05:20

## 2019-05-13 RX ADMIN — HEPARIN SODIUM SCH UNITS: 5000 INJECTION, SOLUTION INTRAVENOUS; SUBCUTANEOUS at 20:19

## 2019-05-13 RX ADMIN — DEXTROSE AND SODIUM CHLORIDE SCH MLS/HR: 5; .45 INJECTION, SOLUTION INTRAVENOUS at 16:22

## 2019-05-13 RX ADMIN — HEPARIN SODIUM SCH UNITS: 5000 INJECTION, SOLUTION INTRAVENOUS; SUBCUTANEOUS at 09:13

## 2019-05-13 RX ADMIN — DOCUSATE SODIUM SCH MG: 50 LIQUID ORAL at 09:13

## 2019-05-13 NOTE — GI PROGRESS NOTE
Subjective





- Review of Systems


Service Date: 05/13/19


Events since last encounter: 





No events


Subjective: 





constipated again





Objective





- Results


Result Diagrams: 


 05/11/19 06:05





 05/11/19 06:05


Recent Labs: 


 Laboratory Last Values











WBC  5.1 Th/cmm (4.8-10.8)   05/11/19  06:05    


 


RBC  4.51 Mil/cmm (4.30-5.70)   05/11/19  06:05    


 


Hgb  13.7 gm/dL (12-16)   05/11/19  06:05    


 


Hct  41.5 % (41.0-60)   05/11/19  06:05    


 


MCV  92.1 fl (80-99)   05/11/19  06:05    


 


MCH  30.4 pg (26.0-30.0)  H  05/11/19  06:05    


 


MCHC Differential  33.0 pg (28.0-36.0)   05/11/19  06:05    


 


RDW  12.8 % (11.5-20.0)   05/11/19  06:05    


 


Plt Count  207 Th/cmm (150-400)   05/11/19  06:05    


 


MPV  7.0 fl  05/11/19  06:05    


 


Neutrophils %  51.7 % (40.0-80.0)   05/11/19  06:05    


 


Lymphocytes %  39.7 % (20.0-50.0)   05/11/19  06:05    


 


Monocytes %  5.7 % (2.0-10.0)   05/11/19  06:05    


 


Eosinophils %  2.5 % (0.0-5.0)   05/11/19  06:05    


 


Basophils %  0.4 % (0.0-2.0)   05/11/19  06:05    


 


Sodium  139 mEq/L (136-145)   05/11/19  06:05    


 


Potassium  4.0 mEq/L (3.5-5.1)   05/11/19  06:05    


 


Chloride  105 mEq/L ()   05/11/19  06:05    


 


Carbon Dioxide  22.3 mEq/L (21.0-31.0)   05/11/19  06:05    


 


Anion Gap  15.7  (7.0-16.0)   05/11/19  06:05    


 


BUN  18 mg/dL (7-25)   05/11/19  06:05    


 


Creatinine  0.6 mg/dL (0.7-1.3)  L  05/11/19  06:05    


 


Est GFR ( Amer)  > 60.0 ml/min (>90)   05/11/19  06:05    


 


Est GFR (Non-Af Amer)  > 60.0 ml/min  05/11/19  06:05    


 


BUN/Creatinine Ratio  30.0   05/11/19  06:05    


 


Glucose  103 mg/dL ()   05/11/19  06:05    


 


POC Glucose  107 MG/DL (70 - 105)  H  05/11/19  17:42    


 


Calcium  8.7 mg/dL (8.6-10.3)   05/11/19  06:05    


 


Magnesium  2.2 mg/dL (1.9-2.7)   05/10/19  05:35    


 


Lipase  21 U/L (11-82)   05/09/19  00:04    














- Physical Exam


Vitals and I&O: 


 Vital Signs











Temp  97.1 F   05/13/19 07:37


 


Pulse  60   05/13/19 07:37


 


Resp  18   05/13/19 07:37


 


BP  107/66   05/13/19 07:37


 


Pulse Ox  97   05/13/19 07:37








 Intake & Output











 05/12/19 05/13/19 05/13/19





 18:59 06:59 18:59


 


Intake Total 1200 1100 


 


Output Total 2  


 


Balance 1198 1100 


 


Weight (lbs) 46.72 kg 46.72 kg 


 


Intake:   


 


  Intake, IV Amount 1000 1000 


 


    D5-0.45NS 1,000 ml @ 75 1000 1000 





    mls/hr IV .[id number] [facility]   





    #:[id number]   


 


  Tube Feeding  100 


 


  Other 200  


 


Output:   


 


  Urine 2  


 


Other:   


 


  # Voids  2 


 


  # Bowel Movements  1 


 


  Weight Source Bedscale Bedscale 











Active Medications: 


Current Medications





Bisacodyl (Dulcolax 10 Mg Supp)  10 mg RC Q24HR PRN


   PRN Reason: MODERATE CONSTIPATION


   Stop: 07/09/19 08:00


   Last Admin: 05/11/19 20:43 Dose:  10 mg


Docusate Sodium (Colace)  200 mg GT DAILY [facility]


   Stop: 07/09/19 08:59


   Last Admin: 05/13/19 09:13 Dose:  200 mg


Heparin Sodium (Porcine) (Heparin)  5,000 units SUBQ Q12H [facility]


   Stop: 07/09/19 20:59


   Last Admin: 05/13/19 09:13 Dose:  5,000 units


Dextrose/Sodium Chloride (D5-0.45ns)  1,000 mls @ 75 mls/hr IV .S49C96S [facility]


   Stop: 07/09/19 02:58


   Last Admin: 05/13/19 05:20 Dose:  75 mls/hr


Magnesium Citrate (Citroma)  17.5 gm PO X1 ONE


   Stop: 05/13/19 10:40


Mineral Oil (Mineral Oil 30 Ml)  30 ml GT BID [facility]


   Stop: 07/10/19 16:59


   Last Admin: 05/13/19 09:13 Dose:  30 ml


Mineral Oil (Fleet Mineral Oil)  118 ml RC X1 ONE


   Stop: 05/13/19 10:37


Miscellaneous (Vte Chemical Prophylaxis Screen/ Admission)  1 ea MC PRN PRN


   PRN Reason: PROTOCOL


   Stop: 07/09/19 13:20


Sodium Phosphate (Fleet Enema)  135 ml RC Q24HR PRN


   PRN Reason: SEVERE CONSTIPATION


   Stop: 07/09/19 08:14


   Last Admin: 05/10/19 16:15 Dose:  135 ml








General: Alert, No acute distress


Cardiovascular: Regular rate, Normal S1, Normal S2


Lungs: Clear to auscultation


Abdomen: Bowel sounds, Soft, Other (G tube and surgical scar), no Tender, no 

Mass





- Procedures


Procedures: 


 Procedures











Procedure Code Date


 


CHANGE FEEDING DEVICE IN UP INTEST TRACT, EXTERN APPROACH [id number] 08/21/17


 


CHANGE GASTROSTOMY TUBE 80628 08/21/17


 


DIAGNOSTIC COLONOSCOPY 59329 01/12/17


 


EGD BIOPSY SINGLE/MULTIPLE 46675 07/10/14


 


ESOPHAGOGASTRODUODENOSCOPY [EGD] W/CLOSED BIOPSY 45.16 07/10/14


 


INFLUENZA VACCINATION 99.52 01/30/14


 


INSERT INDWELLING CATH 57.94 12/03/12


 


INSERT INTESTINAL TUBE 96.08 12/03/12


 


INSERT TEMP BLADDER CATH 16604 12/03/12


 


[facility] PICC 5 YR+ W/O IMAGING 38970 12/03/12


 


INSPECTION OF LOWER INTESTINAL TRACT, ENDO [id number] 01/12/17


 


OPEN AND OTHER CECECTOMY 45.72 12/03/12


 


OPEN AND OTHER RIGHT HEMICOLECTOMY 45.73 12/03/12


 


PARTIAL REMOVAL OF COLON 41707 12/03/12


 


UNLISTED PX SMALL INTESTINE 33812 12/03/12


 


VENOUS CATHETERIZATION NEC 38.93 12/03/12














Assessment/Plan





- Assessment


Assessment: 





Fecal impaction


Chronic constipation





- Plan


Plan: 





1.Fecal impaction


Good response to mag citrate.


Repeat again both Mag citrate and mineral oil





2. Chronic constipation


OPT daily mineral oil or MOM

## 2019-05-13 NOTE — INTERNAL MEDICINE PROG NOTE
Internal Medicine Subjective





- Subjective


Service Date: 19 (had large bm yesterday and earlier thia am. Krishna puree 

diet well. Awaiting KUB.)


Patient is:: asleep


Per staff patient has:: no adverse event





Internal Medicine Objective





- Results


Result Diagrams: 


 19 06:05





 19 06:05


Recent Labs: 


 Laboratory Last Values











WBC  5.1 Th/cmm (4.8-10.8)   19  06:05    


 


RBC  4.51 Mil/cmm (4.30-5.70)   19  06:05    


 


Hgb  13.7 gm/dL (12-16)   19  06:05    


 


Hct  41.5 % (41.0-60)   19  06:05    


 


MCV  92.1 fl (80-99)   19  06:05    


 


MCH  30.4 pg (26.0-30.0)  H  19  06:05    


 


MCHC Differential  33.0 pg (28.0-36.0)   19  06:05    


 


RDW  12.8 % (11.5-20.0)   19  06:05    


 


Plt Count  207 Th/cmm (150-400)   19  06:05    


 


MPV  7.0 fl  19  06:05    


 


Neutrophils %  51.7 % (40.0-80.0)   19  06:05    


 


Lymphocytes %  39.7 % (20.0-50.0)   19  06:05    


 


Monocytes %  5.7 % (2.0-10.0)   19  06:05    


 


Eosinophils %  2.5 % (0.0-5.0)   19  06:05    


 


Basophils %  0.4 % (0.0-2.0)   19  06:05    


 


Sodium  139 mEq/L (136-145)   19  06:05    


 


Potassium  4.0 mEq/L (3.5-5.1)   19  06:05    


 


Chloride  105 mEq/L ()   19  06:05    


 


Carbon Dioxide  22.3 mEq/L (21.0-31.0)   19  06:05    


 


Anion Gap  15.7  (7.0-16.0)   19  06:05    


 


BUN  18 mg/dL (7-25)   19  06:05    


 


Creatinine  0.6 mg/dL (0.7-1.3)  L  19  06:05    


 


Est GFR ( Amer)  > 60.0 ml/min (>90)   19  06:05    


 


Est GFR (Non-Af Amer)  > 60.0 ml/min  19  06:05    


 


BUN/Creatinine Ratio  30.0   19  06:05    


 


Glucose  103 mg/dL ()   19  06:05    


 


POC Glucose  107 MG/DL (70 - 105)  H  19  17:42    


 


Calcium  8.7 mg/dL (8.6-10.3)   19  06:05    


 


Magnesium  2.2 mg/dL (1.9-2.7)   05/10/19  05:35    


 


Lipase  21 U/L (11-82)   19  00:04    














- Physical Exam


Vitals and I&O: 


 Vital Signs











Temp  97.1 F   19 07:37


 


Pulse  60   19 07:37


 


Resp  18   19 07:37


 


BP  107/66   19 07:37


 


Pulse Ox  97   19 07:37








 Intake & Output











 19





 18:59 06:59 18:59


 


Intake Total 1200 1100 


 


Output Total 2  


 


Balance 1198 1100 


 


Weight (lbs) 46.72 kg 46.72 kg 


 


Intake:   


 


  Intake, IV Amount 1000 1000 


 


    D5-0.45NS 1,000 ml @ 75 1000 1000 





    mls/hr IV .[id number] [facility]   





    #:[id number]   


 


  Tube Feeding  100 


 


  Other 200  


 


Output:   


 


  Urine 2  


 


Other:   


 


  # Voids  2 


 


  # Bowel Movements  1 


 


  Weight Source Bedscale Bedscale 











Active Medications: 


Current Medications





Bisacodyl (Dulcolax 10 Mg Supp)  10 mg RC Q24HR PRN


   PRN Reason: MODERATE CONSTIPATION


   Stop: 19 08:00


   Last Admin: 19 20:43 Dose:  10 mg


Docusate Sodium (Colace)  200 mg GT DAILY [facility]


   Stop: 19 08:59


   Last Admin: 19 09:46 Dose:  200 mg


Heparin Sodium (Porcine) (Heparin)  5,000 units SUBQ Q12H [facility]


   Stop: 19 20:59


   Last Admin: 19 20:46 Dose:  5,000 units


Dextrose/Sodium Chloride (D5-0.45ns)  1,000 mls @ 75 mls/hr IV .[id number] [facility]


   Stop: 19 02:58


   Last Admin: 19 05:20 Dose:  75 mls/hr


Mineral Oil (Mineral Oil 30 Ml)  30 ml GT BID [facility]


   Stop: 07/10/19 16:59


   Last Admin: 19 16:23 Dose:  30 ml


Miscellaneous (Vte Chemical Prophylaxis Screen/ Admission)  1 ea [facility] PRN PRN


   PRN Reason: PROTOCOL


   Stop: 19 13:20


Sodium Phosphate (Fleet Enema)  135 ml RC Q24HR PRN


   PRN Reason: SEVERE CONSTIPATION


   Stop: 19 08:14


   Last Admin: 05/10/19 16:15 Dose:  135 ml








HEENT: NC/AT, PERRLA, EOMI


Neck: Supple, No JVD


Lungs: CTAB


Cardiovascular: RRR, Normal S1, without murmur


Abdomen: soft, non-tender, non-distended, +GT, positive bowel sound


Extremities: clear





- Procedures


Procedures: 


 Procedures











Procedure Code Date


 


CHANGE FEEDING DEVICE IN UP INTEST TRACT, EXTERN APPROACH [id number] 17


 


CHANGE GASTROSTOMY TUBE 90917 17


 


DIAGNOSTIC COLONOSCOPY 34207 17


 


EGD BIOPSY SINGLE/MULTIPLE 61310 07/10/14


 


ESOPHAGOGASTRODUODENOSCOPY [EGD] W/CLOSED BIOPSY 45.16 07/10/14


 


INFLUENZA VACCINATION 99.52 14


 


INSERT INDWELLING CATH 57.94 12


 


INSERT INTESTINAL TUBE 96.08 12


 


INSERT TEMP BLADDER CATH 59632 12


 


INS PICC 5 YR+ W/O IMAGING 18933 12


 


INSPECTION OF LOWER INTESTINAL TRACT, ENDO [id number] 17


 


OPEN AND OTHER CECECTOMY 45.72 12


 


OPEN AND OTHER RIGHT HEMICOLECTOMY 45.73 12


 


PARTIAL REMOVAL OF COLON 79225 12


 


UNLISTED PX SMALL INTESTINE 50379 12


 


VENOUS CATHETERIZATION NEC 38.93 12














Internal Medicine Assmt/Plan





- Assessment


Assessment: 





RECURRENT ABD DISTENTION 2RY TO CONSTIPATION-clinically improved.


CONTIPATION/ILEUS-clinically improved.


HX OF DYSPHAGIA-S/P PEG-for med administratuin only.


HX OF INTELLECTUAL DISABILITY


HX OF OP





- Plan


Plan: 





CONT WITH CURRENT SUPPORTIVE CARE AND MGT


CONT WITH IVF/PEG FEEDS ON HOLD


CONT WITH MINERAL OIL ENEMA BID, DULCOLAX, DSS


FLEET ENEMA PRN


[facility] DC PLANNING FOR TODAY





Nutritional Asmnt/Malnutr-PDOC





- Dietary Evaluation


Malnutrition Findings (Please click <Entered> for more info): 








Nutritional Asmnt/Malnutrition                             Start:  05/10/19 15:

51


Text:                                                      Status: Complete    

  


Freq:                                                                          

  


Protocol:                                                                      

  


 Document     05/10/19 15:51  LCHENG  (Rec: 05/10/19 15:58  LCASHLEYG  [id number])


 Nutritional Asmnt/Malnutrition


     Patient General Information


      Nutritional Screening                      High Risk


      Diagnosis                                  constipation


      Pertinent Medical Hx/Surgical Hx           PUD/GERD, MR, Gtube


      Subjective Information                     Pt seen asleep in bed at time


                                                 of visit. NPO status.


      Current Diet Order/ Nutrition Support      NPO


      Pertinent Medications                      D5-0.45ns, colace, mineral oil


      Pertinent Labs                             5/10 Cr 0.5, glucose 60


     Nutritional Hx/Data


      Height                                     1.68 m


      Height (Calculated Centimeters)            167.6


      Current Weight (lbs)                       46.72 kg


      Weight (Calculated Kilograms)              46.7


      Weight (Calculated Grams)                  24012.0


      Ideal Body Weight                          142


      Body Mass Index (BMI)                      16.6


      Weight Status                              Underweight


     GI Symptoms


      GI Symptoms                                Constipation


      Last BM                                    not indicated


      Difficult in:                              None


      Skin Integrity/Comment:                    intact


     Estimated Nutritional Goals


      BEE in Kcals:                              Using Current wt


      Calories/Kcals/Kg                          30-35


      Kcals Calculated                           3221-0797


      Protein:                                   Using Current wt


      Protein g/k.2-1.4


      Protein Calculated                         56-66


      Fluid: ml                                  1410-1645ml (1m/kcal)


     Nutritional Problem


      1. Problem


       Problem                                   altered GI function


       Etiology                                  possible GI dysfunction


       Signs/Symptoms:                           constipation


     Intervention/Recommendation


      Comments                                   1. Monitor NPO status. Resume


                                                 TF as tolerated when medically


                                                 appropriate. Consider PPN if


                                                 TF not tolerated.


                                                 2. Monitor GI function, wt,


                                                 labs and skin integrity


                                                 3. F/U as high risk in 2-3


                                                 days


     Expected Outcomes/Goals


      Expected Outcomes/Goals                    1. Pt to meet at least 90% of


                                                 nutritional needs via


                                                 nutrition support with


                                                 tolerance


                                                 2. Wt stability, GI function


                                                 to improve, skin to remain


                                                 intact, labs to approach WNL.

## 2019-05-13 NOTE — DIAGNOSTIC IMAGING REPORT
KUB abdominal film



HISTORY: Pain, fecal impaction



The exam demonstrates a distended stool-filled rectum.  Mildly dilated

small bowel and nondilated large bowel noted.  Blue-tip catheter is seen

over the upper left mid abdomen.  No free intraperitoneal air.



IMPRESSION:

1.  Distended stool-filled rectum consistent with changes of a fecal

impaction.

## 2019-05-14 RX ADMIN — DEXTROSE AND SODIUM CHLORIDE SCH MLS/HR: 5; .45 INJECTION, SOLUTION INTRAVENOUS at 02:07

## 2019-05-14 RX ADMIN — DOCUSATE SODIUM SCH MG: 50 LIQUID ORAL at 09:54

## 2019-05-14 RX ADMIN — HEPARIN SODIUM SCH UNITS: 5000 INJECTION, SOLUTION INTRAVENOUS; SUBCUTANEOUS at 09:54

## 2019-05-14 NOTE — DIAGNOSTIC IMAGING REPORT
Exam: KUB of the abdomen



HISTORY: Constipation



Findings:



Supine examination the abdomen reviewed and compared to prior study of

05/13/2019 demonstrates unchanged appearance of the distention with air

small bowel and large bowel loops throughout.  Gastrostomy tube midline.

 There is no evidence of constipation.



IMPRESSION: Extensive pelvic ileus with distended small bowel and large

bowel loops throughout the abdomen with air.

## 2019-05-14 NOTE — GI PROGRESS NOTE
Subjective





- Review of Systems


Service Date: 05/14/19


Events since last encounter: 





No events


Subjective: 





Better





Objective





- Results


Result Diagrams: 


 05/11/19 06:05





 05/11/19 06:05


Recent Labs: 


 Laboratory Last Values











WBC  5.1 Th/cmm (4.8-10.8)   05/11/19  06:05    


 


RBC  4.51 Mil/cmm (4.30-5.70)   05/11/19  06:05    


 


Hgb  13.7 gm/dL (12-16)   05/11/19  06:05    


 


Hct  41.5 % (41.0-60)   05/11/19  06:05    


 


MCV  92.1 fl (80-99)   05/11/19  06:05    


 


MCH  30.4 pg (26.0-30.0)  H  05/11/19  06:05    


 


MCHC Differential  33.0 pg (28.0-36.0)   05/11/19  06:05    


 


RDW  12.8 % (11.5-20.0)   05/11/19  06:05    


 


Plt Count  207 Th/cmm (150-400)   05/11/19  06:05    


 


MPV  7.0 fl  05/11/19  06:05    


 


Neutrophils %  51.7 % (40.0-80.0)   05/11/19  06:05    


 


Lymphocytes %  39.7 % (20.0-50.0)   05/11/19  06:05    


 


Monocytes %  5.7 % (2.0-10.0)   05/11/19  06:05    


 


Eosinophils %  2.5 % (0.0-5.0)   05/11/19  06:05    


 


Basophils %  0.4 % (0.0-2.0)   05/11/19  06:05    


 


Sodium  139 mEq/L (136-145)   05/11/19  06:05    


 


Potassium  4.0 mEq/L (3.5-5.1)   05/11/19  06:05    


 


Chloride  105 mEq/L ()   05/11/19  06:05    


 


Carbon Dioxide  22.3 mEq/L (21.0-31.0)   05/11/19  06:05    


 


Anion Gap  15.7  (7.0-16.0)   05/11/19  06:05    


 


BUN  18 mg/dL (7-25)   05/11/19  06:05    


 


Creatinine  0.6 mg/dL (0.7-1.3)  L  05/11/19  06:05    


 


Est GFR ( Amer)  > 60.0 ml/min (>90)   05/11/19  06:05    


 


Est GFR (Non-Af Amer)  > 60.0 ml/min  05/11/19  06:05    


 


BUN/Creatinine Ratio  30.0   05/11/19  06:05    


 


Glucose  103 mg/dL ()   05/11/19  06:05    


 


POC Glucose  107 MG/DL (70 - 105)  H  05/11/19  17:42    


 


Calcium  8.7 mg/dL (8.6-10.3)   05/11/19  06:05    


 


Magnesium  2.2 mg/dL (1.9-2.7)   05/10/19  05:35    


 


Lipase  21 U/L (11-82)   05/09/19  00:04    














- Physical Exam


Vitals and I&O: 


 Vital Signs











Temp  97.4 F   05/14/19 07:42


 


Pulse  54   05/14/19 07:42


 


Resp  18   05/14/19 07:42


 


BP  99/68   05/14/19 07:42


 


Pulse Ox  95   05/14/19 07:42








 Intake & Output











 05/13/19 05/14/19 05/14/19





 18:59 06:59 18:59


 


Intake Total 827.5 781.25 


 


Balance 827.5 781.25 


 


Weight (lbs)  46.72 kg 


 


Intake:   


 


  Intake, IV Amount 827.5 731.25 


 


    D5-0.45NS 1,000 ml @ 75 827.5 731.25 





    mls/hr IV .[id number] [facility]   





    #:[id number]   


 


  Tube Feeding  50 


 


Other:   


 


  # Voids  2 


 


  # Bowel Movements  1 


 


  Stool Characteristics  Liquid 


 


  Weight Source  Bedscale 











Active Medications: 


Current Medications





Bisacodyl (Dulcolax 10 Mg Supp)  10 mg RC Q24HR PRN


   PRN Reason: MODERATE CONSTIPATION


   Stop: 07/09/19 08:00


   Last Admin: 05/11/19 20:43 Dose:  10 mg


Docusate Sodium (Colace)  200 mg GT DAILY [facility]


   Stop: 07/09/19 08:59


   Last Admin: 05/14/19 09:54 Dose:  200 mg


Heparin Sodium (Porcine) (Heparin)  5,000 units SUBQ Q12H [facility]


   Stop: 07/09/19 20:59


   Last Admin: 05/14/19 09:54 Dose:  5,000 units


Dextrose/Sodium Chloride (D5-0.45ns)  1,000 mls @ 75 mls/hr IV .[id number] [facility]


   Stop: 07/09/19 02:58


   Last Admin: 05/14/19 02:07 Dose:  75 mls/hr


Magnesium Citrate (Citroma)  17.5 gm GT X1 ONE


   Stop: 05/14/19 10:39


Mineral Oil (Mineral Oil 30 Ml)  30 ml GT BID [facility]


   Stop: 07/10/19 16:59


   Last Admin: 05/14/19 09:53 Dose:  30 ml


Miscellaneous (Vte Chemical Prophylaxis Screen/ Admission)  1 ea [facility] PRN PRN


   PRN Reason: PROTOCOL


   Stop: 07/09/19 13:20


Sodium Phosphate (Fleet Enema)  135 ml RC Q24HR PRN


   PRN Reason: SEVERE CONSTIPATION


   Stop: 07/09/19 08:14


   Last Admin: 05/10/19 16:15 Dose:  135 ml








General: Alert, No acute distress


Cardiovascular: Regular rate, Normal S1, Normal S2


Lungs: Clear to auscultation


Abdomen: Bowel sounds, Soft, Other (G tube and surgical scar), no Tender, no 

Mass





- Procedures


Procedures: 


 Procedures











Procedure Code Date


 


CHANGE FEEDING DEVICE IN UP INTEST TRACT, EXTERN APPROACH [id number] 08/21/17


 


CHANGE GASTROSTOMY TUBE 58058 08/21/17


 


DIAGNOSTIC COLONOSCOPY 08118 01/12/17


 


EGD BIOPSY SINGLE/MULTIPLE 61149 07/10/14


 


ESOPHAGOGASTRODUODENOSCOPY [EGD] W/CLOSED BIOPSY 45.16 07/10/14


 


INFLUENZA VACCINATION 99.52 01/30/14


 


INSERT INDWELLING CATH 57.94 12/03/12


 


INSERT INTESTINAL TUBE 96.08 12/03/12


 


INSERT TEMP BLADDER CATH 76906 12/03/12


 


INSJ PICC 5 YR+ W/O IMAGING 35276 12/03/12


 


INSPECTION OF LOWER INTESTINAL TRACT, ENDO [id number] 01/12/17


 


OPEN AND OTHER CECECTOMY 45.72 12/03/12


 


OPEN AND OTHER RIGHT HEMICOLECTOMY 45.73 12/03/12


 


PARTIAL REMOVAL OF COLON 71756 12/03/12


 


UNLISTED PX SMALL INTESTINE 19636 12/03/12


 


VENOUS CATHETERIZATION NEC 38.93 12/03/12














Assessment/Plan





- Assessment


Assessment: 





Fecal impaction


Chronic constipation





- Plan


Plan: 





1.Fecal impaction


Good response to mag citrate.


Repeat again both Mag citrate and mineral oil





2. Chronic constipation


OPT daily mineral oil or MOM


and Miralax

## 2019-05-20 ENCOUNTER — HOSPITAL ENCOUNTER (INPATIENT)
Dept: HOSPITAL 36 - ER | Age: 34
LOS: 4 days | Discharge: NURSING, REHAB OR LONG-TERM CARE | DRG: 247 | End: 2019-05-24
Attending: INTERNAL MEDICINE | Admitting: INTERNAL MEDICINE
Payer: MEDICAID

## 2019-05-20 DIAGNOSIS — M19.90: ICD-10-CM

## 2019-05-20 DIAGNOSIS — E44.0: ICD-10-CM

## 2019-05-20 DIAGNOSIS — M81.0: ICD-10-CM

## 2019-05-20 DIAGNOSIS — F73: ICD-10-CM

## 2019-05-20 DIAGNOSIS — K31.9: ICD-10-CM

## 2019-05-20 DIAGNOSIS — R53.2: ICD-10-CM

## 2019-05-20 DIAGNOSIS — K56.7: Primary | ICD-10-CM

## 2019-05-20 DIAGNOSIS — K21.9: ICD-10-CM

## 2019-05-20 DIAGNOSIS — G80.9: ICD-10-CM

## 2019-05-20 DIAGNOSIS — K56.41: ICD-10-CM

## 2019-05-20 LAB
ALBUMIN SERPL-MCNC: 3.9 GM/DL (ref 4.2–5.5)
ALBUMIN/GLOB SERPL: 1.5 {RATIO} (ref 1–1.8)
ALP SERPL-CCNC: 50 U/L (ref 34–104)
ALT SERPL-CCNC: 16 U/L (ref 7–52)
ANION GAP SERPL CALC-SCNC: 12.6 MMOL/L (ref 7–16)
AST SERPL-CCNC: 17 U/L (ref 13–39)
BASOPHILS # BLD AUTO: 0 TH/CUMM (ref 0–0.2)
BASOPHILS NFR BLD AUTO: 0.8 % (ref 0–2)
BILIRUB SERPL-MCNC: 0.2 MG/DL (ref 0.3–1)
BUN SERPL-MCNC: 14 MG/DL (ref 7–25)
CALCIUM SERPL-MCNC: 9 MG/DL (ref 8.6–10.3)
CHLORIDE SERPL-SCNC: 103 MEQ/L (ref 98–107)
CO2 SERPL-SCNC: 26.3 MEQ/L (ref 21–31)
CREAT SERPL-MCNC: 0.6 MG/DL (ref 0.7–1.3)
EOSINOPHIL # BLD AUTO: 0.2 TH/CMM (ref 0.1–0.4)
EOSINOPHIL NFR BLD AUTO: 2.9 % (ref 0–5)
ERYTHROCYTE [DISTWIDTH] IN BLOOD BY AUTOMATED COUNT: 12.9 % (ref 11.5–20)
GLOBULIN SER-MCNC: 2.6 GM/DL
GLUCOSE SERPL-MCNC: 86 MG/DL (ref 70–105)
HCT VFR BLD CALC: 36.9 % (ref 41–60)
HGB BLD-MCNC: 12 GM/DL (ref 12–16)
LYMPHOCYTE AB SER FC-ACNC: 1.9 TH/CMM (ref 1.5–3)
LYMPHOCYTES NFR BLD AUTO: 32 % (ref 20–50)
MCH RBC QN AUTO: 29.8 PG (ref 26–30)
MCHC RBC AUTO-ENTMCNC: 32.6 PG (ref 28–36)
MCV RBC AUTO: 91.6 FL (ref 80–99)
MONOCYTES # BLD AUTO: 0.5 TH/CMM (ref 0.3–1)
MONOCYTES NFR BLD AUTO: 8.7 % (ref 2–10)
NEUTROPHILS # BLD: 3.4 TH/CMM (ref 1.8–8)
NEUTROPHILS NFR BLD AUTO: 55.6 % (ref 40–80)
PLATELET # BLD: 179 TH/CMM (ref 150–400)
POTASSIUM SERPL-SCNC: 4.9 MEQ/L (ref 3.5–5.1)
RBC # BLD AUTO: 4.03 MIL/CMM (ref 4.3–5.7)
SODIUM SERPL-SCNC: 137 MEQ/L (ref 136–145)
WBC # BLD AUTO: 6 TH/CMM (ref 4.8–10.8)

## 2019-05-20 PROCEDURE — Z7610: HCPCS

## 2019-05-20 PROCEDURE — C9113 INJ PANTOPRAZOLE SODIUM, VIA: HCPCS

## 2019-05-20 NOTE — ED PHYSICIAN CHART
ED Chief Complaint/HPI





- Patient Information


Date Seen:: 05/20/19


Time Seen:: 22:00


Chief Complaint:: Pt has not had bowel movement for about 3 days.


History of Present Illness:: 





Brought in by ambulance from nursing facility because pt has not had a bowel 

movement for about 3 days. Pt  appears to be comfortable without distress. Pt 

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 Pt is not 

cooperative; thus, H & P are limited.


Allergies:: 


 Allergies











Allergy/AdvReac Type Severity Reaction Status Date / Time


 


No Known Allergies Allergy   Verified 05/09/19 23:39











Vitals:: 


 Vital Signs - 8 hr











  05/20/19





  21:46


 


Temp 98 F


 


HR 85


 


RR 17


 


/67


 


O2 Sat % 96











Historian:: Family Member (mother.), Medical Records (from transferring 

facility.)


Review:: Nurse's Note Reviewed, Transfer documents Reviewed





ED Review of Systems





- Review of Systems


General/Constitutional: Other (Pt does not cooperate for ROS.)





ED Past Medical History





- Past Medical History


Past Medical History: PUD/GERD, Arthritis, Other (profound intellectual 

disabilities/cerebral palsy.)


Family History: None


Social History: Non Smoker, No Alcohol, No Drug Use, Single, Care Facility


Surgical History: PEG/GTube, other (abdominal surgery in 2012)


Psychiatricy History: Dementia


Medication: Reviewed





Family Medical History





- Family Member


  ** Mother


History Unknown: Yes


Ethnicity: Non-


Living Status: Still Living





ED Physical Exam





- Physical Examination


General/Constitutional: Awake, Well-developed, well-nourished ( male), Alert, 

No distress, Non-toxic appearing


Other Gen/Cons comments:: 





Pt is reponsive to voice and tactile stimuli.


Head: Atraumatic


Eyes: Lids, conjuctiva normal, PERRL


Skin: No lymphadenopathy


ENMT: External ears, nose nl, Nasal exam nl, Oropharynx nl


Neck: Nontender, Full ROM w/o pain, No nuchal rigidity


Respiratory: Nl effort/Exclusion, Clear to Auscultation, No Wheeze/Rhonchi/Rales


Cardio Vascular: RRR, No murmur, gallop, rubs


GI: No organomegaly, No hernia, Normal BS's, No mass/bruits, No McBurney 

tenderness


Other GI comments:: 





Gastrostomy tube was noticed at epigastric region. No R/G. ? mild distension.


Extremities: No edema


Other Neuro/Psych comments:: 





Alert and responsive to voice and tactile stimuli. Contractures noticed in all 

4 extremities. Pt has spontaneous movements noticed in all 4 extremities.





ED Labs/Radiology/EKG Results





- Lab Results


Results: 





 Laboratory Results - last 24 hr











  05/20/19 05/20/19





  22:00 22:00


 


WBC  6.0 


 


RBC  4.03 L 


 


Hgb  12.0 


 


Hct  36.9 L 


 


MCV  91.6 


 


MCH  29.8 


 


MCHC Differential  32.6 


 


RDW  12.9 


 


Plt Count  179 


 


MPV  6.7 


 


Neutrophils %  55.6 


 


Lymphocytes %  32.0 


 


Monocytes %  8.7 


 


Eosinophils %  2.9 


 


Basophils %  0.8 


 


Sodium   137


 


Potassium   4.9


 


Chloride   103


 


Carbon Dioxide   26.3


 


Anion Gap   12.6


 


BUN   14


 


Creatinine   0.6 L


 


Est GFR ( Amer)   > 60.0


 


Est GFR (Non-Af Amer)   > 60.0


 


BUN/Creatinine Ratio   23.3


 


Glucose   86


 


Calcium   9.0


 


Total Bilirubin   0.2 L


 


AST   17


 


ALT   16


 


Alkaline Phosphatase   50


 


Total Protein   6.5


 


Albumin   3.9 L


 


Globulin   2.6


 


Albumin/Globulin Ratio   1.5








 Laboratory Last Values











WBC  6.0 Th/cmm (4.8-10.8)   05/20/19  22:00    


 


RBC  4.03 Mil/cmm (4.30-5.70)  L  05/20/19  22:00    


 


Hgb  12.0 gm/dL (12-16)   05/20/19  22:00    


 


Hct  36.9 % (41.0-60)  L  05/20/19  22:00    


 


MCV  91.6 fl (80-99)   05/20/19  22:00    


 


MCH  29.8 pg (26.0-30.0)   05/20/19  22:00    


 


MCHC Differential  32.6 pg (28.0-36.0)   05/20/19  22:00    


 


RDW  12.9 % (11.5-20.0)   05/20/19  22:00    


 


Plt Count  179 Th/cmm (150-400)   05/20/19  22:00    


 


MPV  6.7 fl  05/20/19  22:00    


 


Neutrophils %  55.6 % (40.0-80.0)   05/20/19  22:00    


 


Lymphocytes %  32.0 % (20.0-50.0)   05/20/19  22:00    


 


Monocytes %  8.7 % (2.0-10.0)   05/20/19  22:00    


 


Eosinophils %  2.9 % (0.0-5.0)   05/20/19  22:00    


 


Basophils %  0.8 % (0.0-2.0)   05/20/19  22:00    


 


Sodium  137 mEq/L (136-145)   05/20/19  22:00    


 


Potassium  4.9 mEq/L (3.5-5.1)   05/20/19  22:00    


 


Chloride  103 mEq/L ()   05/20/19  22:00    


 


Carbon Dioxide  26.3 mEq/L (21.0-31.0)   05/20/19  22:00    


 


Anion Gap  12.6  (7.0-16.0)   05/20/19  22:00    


 


BUN  14 mg/dL (7-25)   05/20/19  22:00    


 


Creatinine  0.6 mg/dL (0.7-1.3)  L  05/20/19  22:00    


 


Est GFR ( Amer)  > 60.0 ml/min (>90)   05/20/19  22:00    


 


Est GFR (Non-Af Amer)  > 60.0 ml/min  05/20/19  22:00    


 


BUN/Creatinine Ratio  23.3   05/20/19  22:00    


 


Glucose  86 mg/dL ()   05/20/19  22:00    


 


Calcium  9.0 mg/dL (8.6-10.3)   05/20/19  22:00    


 


Total Bilirubin  0.2 mg/dL (0.3-1.0)  L  05/20/19  22:00    


 


AST  17 U/L (13-39)   05/20/19  22:00    


 


ALT  16 U/L (7-52)   05/20/19  22:00    


 


Alkaline Phosphatase  50 U/L ()   05/20/19  22:00    


 


Total Protein  6.5 gm/dL (6.0-8.3)   05/20/19  22:00    


 


Albumin  3.9 gm/dL (4.2-5.5)  L  05/20/19  22:00    


 


Globulin  2.6 gm/dL  05/20/19  22:00    


 


Albumin/Globulin Ratio  1.5  (1.0-1.8)   05/20/19  22:00    








Pending lab results to be followed by admitting attending adalbertoyician: urinalysis.





- Radiology Results


Results: 





CT abdomen/pelvis: Probable trace bilateral pleural effusions. Mild dependent 

atelectasis bilaterally. Prominence of the wall of the distal esophagus may 

represent esophagitis. Interposition of colon anterior to the liver. No 

hydronephrosis or definite stone. Very large amount of stool in the rectum is 

compatible with fecal impaction. Large amount of stool in the rest of colon is 

suggestive of constipation. Prominent fluid and gas filled small bowel loops 

are nonspecific but may represent enteritis or ileus. Nonspecific bladder wall 

thickening. Please correlate with urinalysis to evaluate for cystitis. 

Percutaneous G-tube in place. Chronic dysplasia of the hips. Official report 

per Dr. Aram Barker, radiologist.





ED Septic Shock





- .


Is Septic Shock (SBP<90, OR Lactate>4 mmol\L) present?: No





- <6hrs of presentation:


Vital Signs: 


 Vital Signs - 8 hr











  05/20/19





  21:46


 


Temp 98 F


 


HR 85


 


RR 17


 


/67


 


O2 Sat % 96














ED Reassessment (Disposition)





- Reassessment


Reassessment:: 





0008 Pt remains stable. Case was discussed with Dr. Matos with pertinent info 

reviewed. Pt is to be admitted to [facility] under his care.


Reassessment Condition:: Improved





- Diagnosis


Diagnosis:: 





Fecal impaction.


Profound intellectual disability.





- Patient Disposition


Admitted to:: Med/Surg


Admitting Medical Physician:: Darien Matos


Time:: 00:15


Condition at Disposition:: Stable

## 2019-05-21 VITALS — DIASTOLIC BLOOD PRESSURE: 69 MMHG | SYSTOLIC BLOOD PRESSURE: 104 MMHG

## 2019-05-21 LAB
ALBUMIN SERPL-MCNC: 3.7 GM/DL (ref 4.2–5.5)
ALBUMIN/GLOB SERPL: 1.5 {RATIO} (ref 1–1.8)
ALP SERPL-CCNC: 44 U/L (ref 34–104)
ALT SERPL-CCNC: 15 U/L (ref 7–52)
ANION GAP SERPL CALC-SCNC: 9.8 MMOL/L (ref 7–16)
APPEARANCE UR: CLEAR
AST SERPL-CCNC: 16 U/L (ref 13–39)
BASOPHILS # BLD AUTO: 0 TH/CUMM (ref 0–0.2)
BASOPHILS NFR BLD AUTO: 0.4 % (ref 0–2)
BILIRUB SERPL-MCNC: 0.3 MG/DL (ref 0.3–1)
BILIRUB UR-MCNC: NEGATIVE MG/DL
BUN SERPL-MCNC: 11 MG/DL (ref 7–25)
CALCIUM SERPL-MCNC: 8.7 MG/DL (ref 8.6–10.3)
CHLORIDE SERPL-SCNC: 108 MEQ/L (ref 98–107)
CO2 SERPL-SCNC: 26.6 MEQ/L (ref 21–31)
COLOR UR: YELLOW
CREAT SERPL-MCNC: 0.6 MG/DL (ref 0.7–1.3)
EOSINOPHIL # BLD AUTO: 0.2 TH/CMM (ref 0.1–0.4)
EOSINOPHIL NFR BLD AUTO: 3.6 % (ref 0–5)
ERYTHROCYTE [DISTWIDTH] IN BLOOD BY AUTOMATED COUNT: 12.5 % (ref 11.5–20)
GLOBULIN SER-MCNC: 2.4 GM/DL
GLUCOSE SERPL-MCNC: 101 MG/DL (ref 70–105)
GLUCOSE UR STRIP-MCNC: NEGATIVE MG/DL
HCT VFR BLD CALC: 36.5 % (ref 41–60)
HGB BLD-MCNC: 12 GM/DL (ref 12–16)
KETONES UR STRIP-MCNC: NEGATIVE MG/DL
LEUKOCYTE ESTERASE UR-ACNC: NEGATIVE
LYMPHOCYTE AB SER FC-ACNC: 1.8 TH/CMM (ref 1.5–3)
LYMPHOCYTES NFR BLD AUTO: 35.9 % (ref 20–50)
MAGNESIUM SERPL-MCNC: 2.4 MG/DL (ref 1.9–2.7)
MCH RBC QN AUTO: 30 PG (ref 26–30)
MCHC RBC AUTO-ENTMCNC: 32.9 PG (ref 28–36)
MCV RBC AUTO: 91.1 FL (ref 80–99)
MICRO URNS: NO
MONOCYTES # BLD AUTO: 0.4 TH/CMM (ref 0.3–1)
MONOCYTES NFR BLD AUTO: 6.9 % (ref 2–10)
NEUTROPHILS # BLD: 2.7 TH/CMM (ref 1.8–8)
NEUTROPHILS NFR BLD AUTO: 53.2 % (ref 40–80)
NITRITE UR QL STRIP: NEGATIVE
PH UR STRIP: 7 [PH] (ref 4.6–8)
PLATELET # BLD: 170 TH/CMM (ref 150–400)
POTASSIUM SERPL-SCNC: 4.4 MEQ/L (ref 3.5–5.1)
PROT UR STRIP-MCNC: NEGATIVE MG/DL
RBC # BLD AUTO: 4.01 MIL/CMM (ref 4.3–5.7)
RBC # UR STRIP: NEGATIVE /UL
SODIUM SERPL-SCNC: 140 MEQ/L (ref 136–145)
SP GR UR STRIP: 1.01 (ref 1–1.03)
URINALYSIS COMPLETE PNL UR: (no result)
UROBILINOGEN UR STRIP-ACNC: 0.2 E.U./DL (ref 0.2–1)
WBC # BLD AUTO: 5.1 TH/CMM (ref 4.8–10.8)

## 2019-05-21 RX ADMIN — SODIUM PHOSPHATE, DIBASIC AND SODIUM PHOSPHATE, MONOBASIC SCH ML: 7; 19 ENEMA RECTAL at 14:51

## 2019-05-21 RX ADMIN — POLYETHYLENE GLYCOL 3350 SCH GM: 17 POWDER, FOR SOLUTION ORAL at 16:21

## 2019-05-21 NOTE — DIAGNOSTIC IMAGING REPORT
CT abdomen and pelvis without intravenous contrast



Indication: Fecal impaction



Comparison: CT abdomen and pelvis on 5/10/2019,



Technique: Axial images were obtained from the lung bases to the

bilateral proximal femurs without IV contrast.  Coronal reconstructions

were made.  total DLP: 490,  CTDI 8.5    



FINDINGS:



Hypoventilatory atelectatic changes of the lung bases are noted.  There

may be trace effusions.  Injury loops of bowel are seen on the left

hemidiaphragm.  There is mild prominence of the distal esophageal wall.



Exam is limited due to lack of IV and oral contrast and lack of

intra-abdominal body fat.  Exam is also limited due to positioning of

discrete focal hepatic lesions.  There is suboptimal assessment of the

spleen and pancreas.  No focal adrenal lesions.  No hydronephrosis or

nephrolithiasis.  There is massive amount of stool throughout the colon

with significant distal fecal impaction anterior displacement of the

urinary bladder with urinary bladder wall thickening noted.  



Diffuse gas distended loops of bowel are also noted.  Fluid-filled loops

of small bowel are also noted along the right lower quadrant.  Appendix

is not visualized.  No free air or free fluid.  Scoliosis is noted. 

Developmental hip dysplasia of both hips are noted.



IMPRESSION:



Limited exam demonstrating significant stool throughout the colon with

significant distal fecal impaction and associated mass effect and

anterior displacement of the urine bladder.



Additional probable gas-filled loops of bowel and fluid distended loops

of bowel on the right lower quadrant which may be due to enteritis or

ileus.



Urinary bladder wall thickening.  Inflammatory process cannot be

excluded.



Mild prominence of distal esophageal wall which may be due to underlying

inflammatory or infiltrative process focal correlation is needed.



Percutaneous gastric feeding tube noted.



Bilateral chronic developmental hip dysplasia.

## 2019-05-22 RX ADMIN — DEXTROSE AND SODIUM CHLORIDE SCH MLS/HR: 5; .45 INJECTION, SOLUTION INTRAVENOUS at 23:12

## 2019-05-22 RX ADMIN — MINERAL OIL SCH ML: 100 ENEMA RECTAL at 20:55

## 2019-05-22 RX ADMIN — POLYETHYLENE GLYCOL 3350 SCH GM: 17 POWDER, FOR SOLUTION ORAL at 16:24

## 2019-05-22 RX ADMIN — SODIUM PHOSPHATE, DIBASIC AND SODIUM PHOSPHATE, MONOBASIC SCH ML: 7; 19 ENEMA RECTAL at 09:20

## 2019-05-22 RX ADMIN — POLYETHYLENE GLYCOL 3350 SCH GM: 17 POWDER, FOR SOLUTION ORAL at 08:49

## 2019-05-22 RX ADMIN — DEXTROSE AND SODIUM CHLORIDE SCH MLS/HR: 5; .45 INJECTION, SOLUTION INTRAVENOUS at 09:46

## 2019-05-22 RX ADMIN — SODIUM PHOSPHATE, DIBASIC AND SODIUM PHOSPHATE, MONOBASIC SCH: 7; 19 ENEMA RECTAL at 10:23

## 2019-05-22 NOTE — DIAGNOSTIC IMAGING REPORT
KUB single view



HISTORY: Constipation



COMPARISON: KUB performed on 5/14/2019 CT abdomen and pelvis performed

on 5/20/2019



FINDINGS: There are severe generalized distended loops of bowel greatest

involving the large bowel.  There is copious stool seen along the left

colon.  No obvious distal fecal impaction identified by x-ray.  



Developmental hip dysplasia is noted.  Increased sclerotic densities of

the right proximal femoral shaft are noted.  There may have been old

trauma to the left proximal femoral shaft.



IMPRESSION:



Severe generalized gas-distended loops of bowel primarily large bowel. 

There is copious stool in the region of the left colon.  No significant

distal fecal impaction identified on this x-ray.  



Developmental bilateral hip dysplasia.  Increase sclerotic densities of

the right proximal femoral shaft may be due to enchondroma formation or

other lesions.  Recommend correlation with old exams and/or follow-up

surveillance.



Probable old trauma to the proximal left femur.

## 2019-05-23 LAB
ALBUMIN SERPL-MCNC: 3.7 GM/DL (ref 4.2–5.5)
ALBUMIN/GLOB SERPL: 1.6 {RATIO} (ref 1–1.8)
ALP SERPL-CCNC: 44 U/L (ref 34–104)
ALT SERPL-CCNC: 12 U/L (ref 7–52)
ANION GAP SERPL CALC-SCNC: 13.3 MMOL/L (ref 7–16)
AST SERPL-CCNC: 15 U/L (ref 13–39)
BASOPHILS # BLD AUTO: 0 TH/CUMM (ref 0–0.2)
BASOPHILS NFR BLD AUTO: 0 % (ref 0–2)
BILIRUB SERPL-MCNC: 0.4 MG/DL (ref 0.3–1)
BUN SERPL-MCNC: 6 MG/DL (ref 7–25)
CALCIUM SERPL-MCNC: 8.4 MG/DL (ref 8.6–10.3)
CHLORIDE SERPL-SCNC: 108 MEQ/L (ref 98–107)
CO2 SERPL-SCNC: 22.8 MEQ/L (ref 21–31)
CREAT SERPL-MCNC: 0.5 MG/DL (ref 0.7–1.3)
EOSINOPHIL # BLD AUTO: 0.3 TH/CMM (ref 0.1–0.4)
EOSINOPHIL NFR BLD AUTO: 7.5 % (ref 0–5)
ERYTHROCYTE [DISTWIDTH] IN BLOOD BY AUTOMATED COUNT: 12.3 % (ref 11.5–20)
GLOBULIN SER-MCNC: 2.3 GM/DL
GLUCOSE SERPL-MCNC: 94 MG/DL (ref 70–105)
HCT VFR BLD CALC: 37.3 % (ref 41–60)
HGB BLD-MCNC: 12.3 GM/DL (ref 12–16)
LYMPHOCYTE AB SER FC-ACNC: 1.7 TH/CMM (ref 1.5–3)
LYMPHOCYTES NFR BLD AUTO: 45.2 % (ref 20–50)
MCH RBC QN AUTO: 30.5 PG (ref 26–30)
MCHC RBC AUTO-ENTMCNC: 33 PG (ref 28–36)
MCV RBC AUTO: 92.6 FL (ref 80–99)
MONOCYTES # BLD AUTO: 0.3 TH/CMM (ref 0.3–1)
MONOCYTES NFR BLD AUTO: 7.4 % (ref 2–10)
NEUTROPHILS # BLD: 1.5 TH/CMM (ref 1.8–8)
NEUTROPHILS NFR BLD AUTO: 39.9 % (ref 40–80)
PHOSPHATE SERPL-MCNC: 2.7 MG/DL (ref 2.5–5)
PLATELET # BLD: 166 TH/CMM (ref 150–400)
POTASSIUM SERPL-SCNC: 4.1 MEQ/L (ref 3.5–5.1)
RBC # BLD AUTO: 4.02 MIL/CMM (ref 4.3–5.7)
SODIUM SERPL-SCNC: 140 MEQ/L (ref 136–145)
WBC # BLD AUTO: 3.8 TH/CMM (ref 4.8–10.8)

## 2019-05-23 RX ADMIN — POLYETHYLENE GLYCOL 3350 SCH GM: 17 POWDER, FOR SOLUTION ORAL at 16:42

## 2019-05-23 RX ADMIN — POLYETHYLENE GLYCOL 3350 SCH GM: 17 POWDER, FOR SOLUTION ORAL at 09:05

## 2019-05-23 RX ADMIN — MINERAL OIL SCH ML: 100 ENEMA RECTAL at 10:49

## 2019-05-23 RX ADMIN — MINERAL OIL SCH: 100 ENEMA RECTAL at 22:00

## 2019-05-23 RX ADMIN — DEXTROSE AND SODIUM CHLORIDE SCH MLS/HR: 5; .45 INJECTION, SOLUTION INTRAVENOUS at 12:26

## 2019-05-23 NOTE — DIAGNOSTIC IMAGING REPORT
KUB single view



HISTORY: Constipation, ileus.



COMPARISON: KUB on 5/22/2019



FINDINGS: There is persistent significant distended loops of air-filled

bowel.  No significant stool is identified.  Percutaneous gastric

feeding tube is noted.



IMPRESSION:



Persistent significant distended air-filled loops of primarily large

bowel which may represent a severe ileus.  No significant stool

identified.  Please correlate clinically.



Percutaneous gastric feeding tube.

## 2019-05-23 NOTE — INTERNAL MEDICINE PROG NOTE
Internal Medicine Subjective





- Subjective


Service Date: 19 (HAD BM YESTERDAY AFTER GOLYTELY)


Patient seen and examined:: without staff


Patient is:: awake


Patient Complaints of:: bloated, other (LESS ABD DISTENTION NOTED TODAY)


Per staff patient has:: no adverse event





Internal Medicine Objective





- Results


Result Diagrams: 


 19 05:50





 19 05:50


Recent Labs: 


 Laboratory Last Values











WBC  3.8 Th/cmm (4.8-10.8)  L  19  05:50    


 


RBC  4.02 Mil/cmm (4.30-5.70)  L  19  05:50    


 


Hgb  12.3 gm/dL (12-16)   19  05:50    


 


Hct  37.3 % (41.0-60)  L  19  05:50    


 


MCV  92.6 fl (80-99)   19  05:50    


 


MCH  30.5 pg (26.0-30.0)  H  19  05:50    


 


MCHC Differential  33.0 pg (28.0-36.0)   19  05:50    


 


RDW  12.3 % (11.5-20.0)   19  05:50    


 


Plt Count  166 Th/cmm (150-400)   19  05:50    


 


MPV  6.8 fl  19  05:50    


 


Neutrophils %  39.9 % (40.0-80.0)  L  19  05:50    


 


Lymphocytes %  45.2 % (20.0-50.0)   19  05:50    


 


Monocytes %  7.4 % (2.0-10.0)   19  05:50    


 


Eosinophils %  7.5 % (0.0-5.0)  H  19  05:50    


 


Basophils %  0.0 % (0.0-2.0)   19  05:50    


 


Sodium  140 mEq/L (136-145)   19  05:50    


 


Potassium  4.1 mEq/L (3.5-5.1)   19  05:50    


 


Chloride  108 mEq/L ()  H  19  05:50    


 


Carbon Dioxide  22.8 mEq/L (21.0-31.0)   19  05:50    


 


Anion Gap  13.3  (7.0-16.0)   19  05:50    


 


BUN  6 mg/dL (7-25)  L  19  05:50    


 


Creatinine  0.5 mg/dL (0.7-1.3)  L  19  05:50    


 


Est GFR ( Amer)  > 60.0 ml/min (>90)   19  05:50    


 


Est GFR (Non-Af Amer)  > 60.0 ml/min  19  05:50    


 


BUN/Creatinine Ratio  12.0   19  05:50    


 


Glucose  94 mg/dL ()   19  05:50    


 


Calcium  8.4 mg/dL (8.6-10.3)  L  19  05:50    


 


Phosphorus  2.7 mg/dL (2.5-5.0)   19  05:50    


 


Magnesium  2.4 mg/dL (1.9-2.7)   19  06:05    


 


Total Bilirubin  0.4 mg/dL (0.3-1.0)   19  05:50    


 


AST  15 U/L (13-39)   19  05:50    


 


ALT  12 U/L (7-52)   19  05:50    


 


Alkaline Phosphatase  44 U/L ()   19  05:50    


 


Total Protein  6.0 gm/dL (6.0-8.3)   19  05:50    


 


Albumin  3.7 gm/dL (4.2-5.5)  L  19  05:50    


 


Globulin  2.3 gm/dL  19  05:50    


 


Albumin/Globulin Ratio  1.6  (1.0-1.8)   19  05:50    


 


TSH  1.02 uIU/ml (0.34-5.60)   19  06:05    


 


Urine Source  CATH   19  23:50    


 


Urine Color  YELLOW   19  23:50    


 


Urine Clarity  CLEAR  (CLEAR)   19  23:50    


 


Urine pH  7.0  (4.6 - 8.0)   19  23:50    


 


Ur Specific Gravity  1.015  (1.005-1.030)   19  23:50    


 


Urine Protein  NEGATIVE mg/dL (NEGATIVE)   19  23:50    


 


Urine Glucose (UA)  NEGATIVE mg/dL (NEGATIVE)   19  23:50    


 


Urine Ketones  NEGATIVE mg/dL (NEGATIVE)   19  23:50    


 


Urine Blood  NEGATIVE  (NEGATIVE)   19  23:50    


 


Urine Nitrate  NEGATIVE  (NEGATIVE)   19  23:50    


 


Urine Bilirubin  NEGATIVE  (NEGATIVE)   19  23:50    


 


Urine Urobilinogen  0.2 E.U./dL (0.2 - 1.0)   19  23:50    


 


Ur Leukocyte Esterase  NEGATIVE  (NEGATIVE)   19  23:50    














- Physical Exam


Vitals and I&O: 


 Vital Signs











Temp  96.9 F   19 08:00


 


Pulse  47   19 08:00


 


Resp  18   19 08:00


 


BP  108/72   19 08:00


 


Pulse Ox  96   19 08:00








 Intake & Output











 19





 18:59 06:59 18:59


 


Intake Total 2000 1000 


 


Output Total  1250 


 


Balance 2000 -250 


 


Weight (lbs) 52.163 kg 52.163 kg 


 


Intake:   


 


  Intake, IV Amount  1000 


 


    D5-0.45NS 1,000 ml @ 75  1000 





    mls/hr IV .[id number] [facility] Rx   





    #:[id number]   


 


  2000  


 


Output:   


 


  Urine  1250 


 


Other:   


 


  # Voids 5  


 


  # Bowel Movements 1  


 


  Weight Source Bedscale Bedscale 











Active Medications: 


Current Medications





Bisacodyl (Dulcolax 10 Mg Supp)  10 mg RC DAILY ALISON


   Stop: 19 09:29


   Last Admin: 19 09:05 Dose:  10 mg


Docusate Sodium (Colace)  250 mg PO BID ALISON


   Stop: 19 08:59


   Last Admin: 19 09:05 Dose:  250 mg


Dextrose/Sodium Chloride (D5-0.45ns)  1,000 mls @ 75 mls/hr IV .[id number] ALISON


   Stop: 19 09:09


   Last Admin: 19 23:12 Dose:  75 mls/hr


Mineral Oil (Fleet Mineral Oil)  135 ml RC Q12HR ALISON


   Stop: 19 20:59


   Last Admin: 19 10:49 Dose:  135 ml


Pantoprazole Sodium (Protonix)  40 mg IVP DAILY ALISON


   Stop: 19 08:59


   Last Admin: 19 09:05 Dose:  40 mg


Polyethylene Glycol (Miralax)  17 gm GT BID ALISON


   Stop: 19 16:59


   Last Admin: 19 09:05 Dose:  17 gm


Polyethylene Glycol/Electrolytes (Golytely)  200 ml PO Q8HR PRN


   PRN Reason: Constipation


   Stop: 19 12:59








General: NAD


HEENT: NC/AT, PERRLA, EOMI


Neck: No LAD


Lungs: CTAB


Cardiovascular: RRR, Normal S1, Normal S2, without murmur


Abdomen: soft, non-tender, distended, +GT, positive bowel sound


Extremities: clear, contracture


Neurological: no change





- Procedures


Procedures: 


 Procedures











Procedure Code Date


 


CHANGE FEEDING DEVICE IN UP INTEST TRACT, EXTERN APPROACH 4O21SUE 17


 


CHANGE GASTROSTOMY TUBE 19080 17


 


DIAGNOSTIC COLONOSCOPY 89444 17


 


EGD BIOPSY SINGLE/MULTIPLE 00430 07/10/14


 


ESOPHAGOGASTRODUODENOSCOPY [EGD] W/CLOSED BIOPSY 45.16 07/10/14


 


INFLUENZA VACCINATION 99.52 14


 


INSERT INDWELLING CATH 57.94 12


 


INSERT INTESTINAL TUBE 96.08 12


 


INSERT TEMP BLADDER CATH 25555 12


 


INSJ PICC 5 YR+ W/O IMAGING 90116 12


 


INSPECTION OF LOWER INTESTINAL TRACT, ENDO 1SAY2CZ 17


 


OPEN AND OTHER CECECTOMY 45.72 12


 


OPEN AND OTHER RIGHT HEMICOLECTOMY 45.73 12


 


PARTIAL REMOVAL OF COLON 24585 12


 


UNLISTED PX SMALL INTESTINE 59437 12


 


VENOUS CATHETERIZATION NEC 38.93 12














Internal Medicine Assmt/Plan





- Assessment


Assessment: 





RECURRENT ILEUS/FECAL IMPACTION-CLINICALLY IMPROVING


ACUTE ON CHRONIC CONSTIPATION


HISTORY OF GERD


HISTORY OF PEG PLACEMENT-CURRENTLY USE FOR MEDICATIONS


HISTORY OF PROFOUND INTELLECTUAL DISABILITY


HISTORY OF CEREBRAL PALSY WITH BILATERAL UPPER/LOWER CONTRACTURES


HISTORY OF OA


HISTORY OF OP





- Plan


Plan: 





CONT WITH CURRENT MS SUPPORTIVE CARE AND MGT


CONT WITH IVF, NPO FOR NOW


CONT WITH CURRENT BOWEL REGIMEN (DULCOLAX RI QDAY/DSS BID/OIL ENEMAS-RI BID/

FLEET ENEMA QDAY).


WILL START GOLYTELY ON A DAILY/PRN BASIS


FOLLOW RAMON


D/W [facility] FROM [facility] (GIVEN PTS REOCCURRENT EPISODES OF ILEUS/FECAL 

IMPACTION,


WILL START ON PEG FEEDS AND DISCONTINUE PO'S FOR NOW).








Nutritional Asmnt/Malnutr-PDOC





- Dietary Evaluation


Malnutrition Findings (Please click <Entered> for more info): 








Nutritional Asmnt/Malnutrition                             Start:  19 13:

57


Text:                                                      Status: Complete    

  


Freq:                                                                          

  


Protocol:                                                                      

  


 Document     19 13:57  RAFAL  (Rec: 19 14:06  RAFAL [id number])


 Nutritional Asmnt/Malnutrition


     Patient General Information


      Nutritional Screening                      High Risk


                                                 Consult


      Diagnosis                                  recurrent ileus, constipation


      Pertinent Medical Hx/Surgical Hx           PUD/GERD, arthritis, profound


                                                 intellectual disabilities, CP,


                                                 PEG/Gtube, abd surgery,


                                                 dementia


      Subjective Information                     Consult received for vlad Roy.


                                                 Pt admited for constipation x


                                                 3 days. Pt seen lying in bed,


                                                 non verbal, family at bedside


                                                 . RN reported pt had BM after


                                                 giving enema. Pt still on NPO


                                                 status. Spoke with family,


                                                 encouraged giving adequate


                                                 fluid to avoid constipation.


      Current Diet Order/ Nutrition Support      NPO


      Pertinent Medications                      D5-0.45ns, colace, mineral oil


                                                 , protonix, miralax, enema


      Pertinent Labs                              Cl 108, Cr 0.6, alb 3.7


     Nutritional Hx/Data


      Height                                     1.68 m


      Height (Calculated Centimeters)            167.6


      Current Weight (lbs)                       48.534 kg


      Weight (Calculated Kilograms)              48.5


      Weight (Calculated Grams)                  33762.4


      Ideal Body Weight                          142


      Body Mass Index (BMI)                      17.2


      Weight Status                              Underweight


     GI Symptoms


      GI Symptoms                                None


      Last BM                                    


      Difficult in:                              None


      Skin Integrity/Comment:                    scar to R/L hip, abdomen


                                                 vlad Roy


      Current %PO                                Negligible < 25%


     Estimated Nutritional Goals


      BEE in Kcals:                              Using Current wt


      Calories/Kcals/Kg                          27-32


      Kcals Calculated                           6178-7472


      Protein:                                   Using Current wt


      Protein g/k-1.2


      Protein Calculated                         49-59


      Fluid: ml                                  1323-1568ml (1ml/kcal)


     Nutritional Problem


      No current Nutrition Prob


       Problem                                   N/A


     Malnutrition Alert


      Is there a minimum of two criteria         No


       selected?                                 


       Query Text:Check all the applicable       


       criteria. A minimum of two criteria are   


       recommended for diagnosis of either       


       severe or non-severe malnutrition.        


     Malnutrition Related to Morbid Obesity


      Malnutrition related to morbid obesity     No


     Intervention/Recommendation


      Comments                                   1. Monitor NPO status.


                                                 Recommend start wtih pureed


                                                 diet when medically


                                                 appropriate. Consider giving


                                                 water flush via Gtube if pt


                                                 not geting adequate fluid from


                                                 oral diet.


                                                 2. Monitor PO intake, wt, labs


                                                 and skin integrity


                                                 3. F/U as high risk in 2-3


                                                 days


     Expected Outcomes/Goals


      Expected Outcomes/Goals                    1. PO intake to meet at least


                                                 75% of nutritional needs.


                                                 2. Wt stability, skin to


                                                 remain intact, GI function


                                                 improved, labs to approach WNL


                                                 .

## 2019-05-24 RX ADMIN — POLYETHYLENE GLYCOL 3350 SCH: 17 POWDER, FOR SOLUTION ORAL at 16:22

## 2019-05-24 RX ADMIN — DEXTROSE AND SODIUM CHLORIDE SCH MLS/HR: 5; .45 INJECTION, SOLUTION INTRAVENOUS at 14:34

## 2019-05-24 RX ADMIN — MINERAL OIL SCH ML: 100 ENEMA RECTAL at 10:03

## 2019-05-24 RX ADMIN — MINERAL OIL SCH: 100 ENEMA RECTAL at 09:23

## 2019-05-24 RX ADMIN — POLYETHYLENE GLYCOL 3350 SCH: 17 POWDER, FOR SOLUTION ORAL at 09:24

## 2019-05-24 RX ADMIN — POLYETHYLENE GLYCOL 3350 SCH GM: 17 POWDER, FOR SOLUTION ORAL at 08:21

## 2019-06-08 NOTE — DISCHARGE SUMMARY
DATE OF DISCHARGE:  05/24/2019



ADMITTING DIAGNOSES:

1.  Recurrent ileus with fecal impaction.

2.  Acute on chronic constipation.



SECONDARY DIAGNOSES:

1.  History of acid reflux disease.

2.  History of dysphagia, status post PEG placement, currently only for meds.

3.  History of osteoporosis.

4.  History of osteoarthritis.

5.  History of profound mental disability with cerebral palsy.



DISCHARGE DIAGNOSES:

1.  Recurrent ileus with fecal impaction -- clinically improved.

2.  Acute on chronic constipation -- clinically improved.



CONSULTANTS:  No consultants were used during this admission.



MAJOR PROCEDURES:  On 05/20/2019, the patient underwent a CT of the abdomen and

pelvis without contrast, showing hypoventilatory atelectatic changes of the lung

bases.  A limited exam demonstrating significant stool throughout the colon with

significant distal fecal impaction and associated mass effect and anterior

displacement of the urinary bladder.



BRIEF HOSPITAL COURSE:  A 33-year-old male with multiple recent admissions for

recurrent constipation, at times with fecal impaction, presented to the ER from

[facility] [place] with 2-3 day history of constipation.  Apparently, the

patient does not get symptomatic, in the sense that he complains of pain, but

given his profound mental disability, once he does not have a bowel movement on

a frequent basis, he gets transferred to the ER for eval.  He underwent the

above-mentioned CT of the abdomen and pelvis while at the ER and was admitted to

the medical floor for management and care.  The patient was placed on multiple

bowel regimen medications including oil enemas b.i.d. and Fleet enemas as well

as stool softeners.  He was also kept n.p.o. and was started on IV fluids.  By

hospital day #2, he had a large BM after a rectal enema was placed, but did not

have any other large bowel movements until the 23rd.  Clinically speaking, he

remained asymptomatic with a nondistended stomach that was nontender to

palpation.  His labs and vital signs remained stable throughout his hospital

stay.  A KUB was done on the 22nd, showing severe generalized gases in the loops

of bowel, primarily large bowel.  There was copious stool in the region of the

left colon.  There was no significant distal fecal impaction noted.  A repeat

KUB was done on the 23rd, showing persistent significant distended air filled

loops of primary large bowel, which may represent ileus.  As noted above,

clinically he did improve with the bowel regimens that he was placed on and his

p.o. feedings were resumed as well as his medications per G-tube.



DISCHARGE MEDICATIONS:  Tylenol 650 q 4 hours p.r.n. for fever or pain, Flomax

70 mg every Saturday, vitamin C 500 mg daily, Dulcolax 10 mg per rectum q. 24

hours, calcium carbonate 500 mg daily, docusate sodium 200 mg q. 12 hours, Fleet

enema per rectum on a daily basis as needed, lactobacillus daily, meloxicam 7.5

b.i.d., mineral oil enemas 30 mL via G-tube b.i.d., multivitamins daily,

nitroglycerin sublingual every 5  minutes p.r.n. for chest pain, Protonix 40 mg

b.i.d. and MiraLax 17 grams b.i.d.



DISPOSITION:  The patient was discharged back to [facility] [place] under

the care of Dr. Mc.  I discussed the case with the patient's mother and

suggested further followup with GI given his recurrent symptoms.  In the past,

he had been seen inpatient by GI service, but the patient may need closer

followup including a possibility of doing a colonoscopy in the near future.





DD: 06/07/2019 08:32

DT: 06/07/2019 11:54

[facility]# [id number]  [id number]

## 2019-08-28 ENCOUNTER — HOSPITAL ENCOUNTER (INPATIENT)
Dept: HOSPITAL 4 - SED | Age: 34
LOS: 10 days | Discharge: NURSING, REHAB OR LONG-TERM CARE | DRG: 247 | End: 2019-09-07
Attending: INTERNAL MEDICINE | Admitting: INTERNAL MEDICINE
Payer: MEDICAID

## 2019-08-28 VITALS — BODY MASS INDEX: 26.53 KG/M2 | HEIGHT: 67 IN | WEIGHT: 169 LBS

## 2019-08-28 VITALS — SYSTOLIC BLOOD PRESSURE: 133 MMHG

## 2019-08-28 DIAGNOSIS — K56.7: ICD-10-CM

## 2019-08-28 DIAGNOSIS — Z93.1: ICD-10-CM

## 2019-08-28 DIAGNOSIS — Z79.899: ICD-10-CM

## 2019-08-28 DIAGNOSIS — K21.9: ICD-10-CM

## 2019-08-28 DIAGNOSIS — K56.609: Primary | ICD-10-CM

## 2019-08-28 DIAGNOSIS — K59.09: ICD-10-CM

## 2019-08-28 DIAGNOSIS — G80.9: ICD-10-CM

## 2019-08-28 DIAGNOSIS — Z74.01: ICD-10-CM

## 2019-08-28 DIAGNOSIS — R13.10: ICD-10-CM

## 2019-08-28 DIAGNOSIS — M19.90: ICD-10-CM

## 2019-08-28 LAB
ALBUMIN SERPL BCP-MCNC: 3.8 G/DL (ref 3.4–4.8)
ALT SERPL W P-5'-P-CCNC: 30 U/L (ref 12–78)
ANION GAP SERPL CALCULATED.3IONS-SCNC: 13 MMOL/L (ref 5–15)
AST SERPL W P-5'-P-CCNC: 35 U/L (ref 10–37)
BASOPHILS # BLD AUTO: 0 K/UL (ref 0–0.2)
BASOPHILS NFR BLD AUTO: 0.2 % (ref 0–2)
BILIRUB SERPL-MCNC: 0.4 MG/DL (ref 0–1)
BUN SERPL-MCNC: 21 MG/DL (ref 8–21)
CALCIUM SERPL-MCNC: 9.3 MG/DL (ref 8.4–11)
CHLORIDE SERPL-SCNC: 106 MMOL/L (ref 98–107)
CREAT SERPL-MCNC: 0.74 MG/DL (ref 0.55–1.3)
EOSINOPHIL # BLD AUTO: 0.1 K/UL (ref 0–0.4)
EOSINOPHIL NFR BLD AUTO: 2 % (ref 0–4)
ERYTHROCYTE [DISTWIDTH] IN BLOOD BY AUTOMATED COUNT: 13.1 % (ref 9–15)
GFR SERPL CREATININE-BSD FRML MDRD: 155 ML/MIN (ref 90–?)
GLUCOSE SERPL-MCNC: 85 MG/DL (ref 70–99)
HCT VFR BLD AUTO: 44.5 % (ref 36–54)
HGB BLD-MCNC: 14.9 G/DL (ref 14–18)
INR PPP: 1.1 (ref 0.8–1.2)
LIPASE SERPL-CCNC: 107 U/L (ref 73–393)
LYMPHOCYTES # BLD AUTO: 2.6 K/UL (ref 1–5.5)
LYMPHOCYTES NFR BLD AUTO: 35.9 % (ref 20.5–51.5)
MCH RBC QN AUTO: 31 PG (ref 27–31)
MCHC RBC AUTO-ENTMCNC: 33 % (ref 32–36)
MCV RBC AUTO: 92 FL (ref 79–98)
MONOCYTES # BLD MANUAL: 0.6 K/UL (ref 0–1)
MONOCYTES # BLD MANUAL: 8.1 % (ref 1.7–9.3)
NEUTROPHILS # BLD AUTO: 3.8 K/UL (ref 1.8–7.7)
NEUTROPHILS NFR BLD AUTO: 53.8 % (ref 40–70)
PLATELET # BLD AUTO: 171 K/UL (ref 130–430)
POTASSIUM SERPL-SCNC: 4.2 MMOL/L (ref 3.5–5.1)
PROTHROMBIN TIME: 11 SECS (ref 9.5–12.5)
RBC # BLD AUTO: 4.84 MIL/UL (ref 4.2–6.2)
SODIUM SERPLBLD-SCNC: 144 MMOL/L (ref 136–145)
WBC # BLD AUTO: 7.1 K/UL (ref 4.8–10.8)

## 2019-08-29 VITALS — SYSTOLIC BLOOD PRESSURE: 122 MMHG

## 2019-08-29 VITALS — SYSTOLIC BLOOD PRESSURE: 147 MMHG

## 2019-08-29 VITALS — SYSTOLIC BLOOD PRESSURE: 106 MMHG

## 2019-08-29 VITALS — SYSTOLIC BLOOD PRESSURE: 119 MMHG

## 2019-08-29 VITALS — SYSTOLIC BLOOD PRESSURE: 128 MMHG

## 2019-08-29 RX ADMIN — PANTOPRAZOLE SODIUM SCH MG: 40 TABLET, DELAYED RELEASE ORAL at 21:44

## 2019-08-29 RX ADMIN — POTASSIUM CHLORIDE, DEXTROSE MONOHYDRATE AND SODIUM CHLORIDE SCH MLS/HR: 75; 5; 450 INJECTION, SOLUTION INTRAVENOUS at 12:38

## 2019-08-29 RX ADMIN — POTASSIUM CHLORIDE, DEXTROSE MONOHYDRATE AND SODIUM CHLORIDE SCH MLS/HR: 75; 5; 450 INJECTION, SOLUTION INTRAVENOUS at 04:25

## 2019-08-29 RX ADMIN — MELOXICAM SCH MG: 7.5 TABLET ORAL at 21:45

## 2019-08-30 VITALS — SYSTOLIC BLOOD PRESSURE: 124 MMHG

## 2019-08-30 VITALS — SYSTOLIC BLOOD PRESSURE: 146 MMHG

## 2019-08-30 VITALS — SYSTOLIC BLOOD PRESSURE: 126 MMHG

## 2019-08-30 VITALS — SYSTOLIC BLOOD PRESSURE: 130 MMHG

## 2019-08-30 RX ADMIN — MELOXICAM SCH MG: 7.5 TABLET ORAL at 09:45

## 2019-08-30 RX ADMIN — POTASSIUM CHLORIDE, DEXTROSE MONOHYDRATE AND SODIUM CHLORIDE SCH MLS/HR: 75; 5; 450 INJECTION, SOLUTION INTRAVENOUS at 00:55

## 2019-08-30 RX ADMIN — Medication SCH CAP: at 09:44

## 2019-08-30 RX ADMIN — PANTOPRAZOLE SODIUM SCH MG: 40 TABLET, DELAYED RELEASE ORAL at 21:20

## 2019-08-30 RX ADMIN — PANTOPRAZOLE SODIUM SCH MG: 40 TABLET, DELAYED RELEASE ORAL at 09:45

## 2019-08-30 RX ADMIN — POTASSIUM CHLORIDE, DEXTROSE MONOHYDRATE AND SODIUM CHLORIDE SCH MLS/HR: 75; 5; 450 INJECTION, SOLUTION INTRAVENOUS at 21:21

## 2019-08-30 RX ADMIN — MELOXICAM SCH MG: 7.5 TABLET ORAL at 21:20

## 2019-08-30 RX ADMIN — Medication SCH GM: at 09:44

## 2019-08-30 RX ADMIN — Medication SCH GM: at 21:20

## 2019-08-31 VITALS — SYSTOLIC BLOOD PRESSURE: 124 MMHG

## 2019-08-31 VITALS — SYSTOLIC BLOOD PRESSURE: 98 MMHG

## 2019-08-31 VITALS — SYSTOLIC BLOOD PRESSURE: 94 MMHG

## 2019-08-31 VITALS — SYSTOLIC BLOOD PRESSURE: 153 MMHG

## 2019-08-31 VITALS — SYSTOLIC BLOOD PRESSURE: 132 MMHG

## 2019-08-31 RX ADMIN — MELOXICAM SCH MG: 7.5 TABLET ORAL at 09:37

## 2019-08-31 RX ADMIN — MELOXICAM SCH MG: 7.5 TABLET ORAL at 21:05

## 2019-08-31 RX ADMIN — Medication SCH GM: at 09:36

## 2019-08-31 RX ADMIN — PANTOPRAZOLE SODIUM SCH MG: 40 TABLET, DELAYED RELEASE ORAL at 21:05

## 2019-08-31 RX ADMIN — PANTOPRAZOLE SODIUM SCH MG: 40 TABLET, DELAYED RELEASE ORAL at 09:36

## 2019-08-31 RX ADMIN — Medication SCH CAP: at 09:36

## 2019-08-31 RX ADMIN — MINERAL OIL PRN ML: 100 ENEMA RECTAL at 12:22

## 2019-08-31 RX ADMIN — POTASSIUM CHLORIDE, DEXTROSE MONOHYDRATE AND SODIUM CHLORIDE SCH MLS/HR: 75; 5; 450 INJECTION, SOLUTION INTRAVENOUS at 17:49

## 2019-08-31 RX ADMIN — Medication SCH GM: at 21:05

## 2019-09-01 VITALS — SYSTOLIC BLOOD PRESSURE: 129 MMHG

## 2019-09-01 VITALS — SYSTOLIC BLOOD PRESSURE: 130 MMHG

## 2019-09-01 VITALS — SYSTOLIC BLOOD PRESSURE: 135 MMHG

## 2019-09-01 VITALS — SYSTOLIC BLOOD PRESSURE: 149 MMHG

## 2019-09-01 RX ADMIN — MELOXICAM SCH MG: 7.5 TABLET ORAL at 09:16

## 2019-09-01 RX ADMIN — MINERAL OIL PRN ML: 100 ENEMA RECTAL at 14:37

## 2019-09-01 RX ADMIN — Medication SCH GM: at 20:25

## 2019-09-01 RX ADMIN — Medication SCH GM: at 09:17

## 2019-09-01 RX ADMIN — MELOXICAM SCH MG: 7.5 TABLET ORAL at 20:25

## 2019-09-01 RX ADMIN — POTASSIUM CHLORIDE, DEXTROSE MONOHYDRATE AND SODIUM CHLORIDE SCH MLS/HR: 75; 5; 450 INJECTION, SOLUTION INTRAVENOUS at 01:38

## 2019-09-01 RX ADMIN — PANTOPRAZOLE SODIUM SCH MG: 40 TABLET, DELAYED RELEASE ORAL at 20:25

## 2019-09-01 RX ADMIN — POTASSIUM CHLORIDE, DEXTROSE MONOHYDRATE AND SODIUM CHLORIDE SCH MLS/HR: 75; 5; 450 INJECTION, SOLUTION INTRAVENOUS at 18:12

## 2019-09-01 RX ADMIN — POTASSIUM CHLORIDE, DEXTROSE MONOHYDRATE AND SODIUM CHLORIDE SCH MLS/HR: 75; 5; 450 INJECTION, SOLUTION INTRAVENOUS at 09:19

## 2019-09-01 RX ADMIN — PANTOPRAZOLE SODIUM SCH MG: 40 TABLET, DELAYED RELEASE ORAL at 09:16

## 2019-09-01 RX ADMIN — Medication SCH CAP: at 09:16

## 2019-09-02 VITALS — SYSTOLIC BLOOD PRESSURE: 126 MMHG

## 2019-09-02 VITALS — SYSTOLIC BLOOD PRESSURE: 127 MMHG

## 2019-09-02 VITALS — SYSTOLIC BLOOD PRESSURE: 118 MMHG

## 2019-09-02 VITALS — SYSTOLIC BLOOD PRESSURE: 143 MMHG

## 2019-09-02 RX ADMIN — MINERAL OIL SCH ML: 1 OIL ORAL at 09:10

## 2019-09-02 RX ADMIN — POTASSIUM CHLORIDE, DEXTROSE MONOHYDRATE AND SODIUM CHLORIDE SCH MLS/HR: 75; 5; 450 INJECTION, SOLUTION INTRAVENOUS at 17:47

## 2019-09-02 RX ADMIN — POTASSIUM CHLORIDE, DEXTROSE MONOHYDRATE AND SODIUM CHLORIDE SCH MLS/HR: 75; 5; 450 INJECTION, SOLUTION INTRAVENOUS at 06:34

## 2019-09-02 RX ADMIN — Medication SCH GM: at 21:06

## 2019-09-02 RX ADMIN — Medication SCH GM: at 09:11

## 2019-09-02 RX ADMIN — Medication SCH CAP: at 09:11

## 2019-09-02 RX ADMIN — MINERAL OIL PRN ML: 100 ENEMA RECTAL at 14:31

## 2019-09-02 RX ADMIN — MELOXICAM SCH MG: 7.5 TABLET ORAL at 21:06

## 2019-09-02 RX ADMIN — PANTOPRAZOLE SODIUM SCH MG: 40 TABLET, DELAYED RELEASE ORAL at 09:11

## 2019-09-02 RX ADMIN — MELOXICAM SCH MG: 7.5 TABLET ORAL at 09:11

## 2019-09-02 RX ADMIN — PANTOPRAZOLE SODIUM SCH MG: 40 TABLET, DELAYED RELEASE ORAL at 21:06

## 2019-09-03 VITALS — SYSTOLIC BLOOD PRESSURE: 126 MMHG

## 2019-09-03 VITALS — SYSTOLIC BLOOD PRESSURE: 124 MMHG

## 2019-09-03 VITALS — SYSTOLIC BLOOD PRESSURE: 141 MMHG

## 2019-09-03 VITALS — SYSTOLIC BLOOD PRESSURE: 132 MMHG

## 2019-09-03 RX ADMIN — PANTOPRAZOLE SODIUM SCH MG: 40 TABLET, DELAYED RELEASE ORAL at 20:56

## 2019-09-03 RX ADMIN — Medication SCH CAP: at 10:20

## 2019-09-03 RX ADMIN — POTASSIUM CHLORIDE, DEXTROSE MONOHYDRATE AND SODIUM CHLORIDE SCH MLS/HR: 75; 5; 450 INJECTION, SOLUTION INTRAVENOUS at 03:29

## 2019-09-03 RX ADMIN — MELOXICAM SCH MG: 7.5 TABLET ORAL at 20:56

## 2019-09-03 RX ADMIN — POTASSIUM CHLORIDE, DEXTROSE MONOHYDRATE AND SODIUM CHLORIDE SCH MLS/HR: 75; 5; 450 INJECTION, SOLUTION INTRAVENOUS at 14:49

## 2019-09-03 RX ADMIN — Medication SCH GM: at 10:21

## 2019-09-03 RX ADMIN — MINERAL OIL SCH ML: 1 OIL ORAL at 10:20

## 2019-09-03 RX ADMIN — Medication SCH GM: at 20:55

## 2019-09-03 RX ADMIN — PANTOPRAZOLE SODIUM SCH MG: 40 TABLET, DELAYED RELEASE ORAL at 10:20

## 2019-09-03 RX ADMIN — MELOXICAM SCH MG: 7.5 TABLET ORAL at 10:21

## 2019-09-04 VITALS — SYSTOLIC BLOOD PRESSURE: 124 MMHG

## 2019-09-04 VITALS — SYSTOLIC BLOOD PRESSURE: 117 MMHG

## 2019-09-04 VITALS — SYSTOLIC BLOOD PRESSURE: 104 MMHG

## 2019-09-04 VITALS — SYSTOLIC BLOOD PRESSURE: 128 MMHG

## 2019-09-04 VITALS — SYSTOLIC BLOOD PRESSURE: 119 MMHG

## 2019-09-04 LAB
ANION GAP SERPL CALCULATED.3IONS-SCNC: < 3 MMOL/L (ref 5–15)
BUN SERPL-MCNC: 4 MG/DL (ref 8–21)
CALCIUM SERPL-MCNC: 9 MG/DL (ref 8.4–11)
CHLORIDE SERPL-SCNC: 106 MMOL/L (ref 98–107)
CREAT SERPL-MCNC: 0.68 MG/DL (ref 0.55–1.3)
GFR SERPL CREATININE-BSD FRML MDRD: 171 ML/MIN (ref 90–?)
GLUCOSE SERPL-MCNC: 105 MG/DL (ref 70–99)
POTASSIUM SERPL-SCNC: 5.1 MMOL/L (ref 3.5–5.1)
SODIUM SERPLBLD-SCNC: 138 MMOL/L (ref 136–145)

## 2019-09-04 RX ADMIN — PANTOPRAZOLE SODIUM SCH MG: 40 TABLET, DELAYED RELEASE ORAL at 22:18

## 2019-09-04 RX ADMIN — Medication SCH GM: at 09:05

## 2019-09-04 RX ADMIN — MELOXICAM SCH MG: 7.5 TABLET ORAL at 22:18

## 2019-09-04 RX ADMIN — PANTOPRAZOLE SODIUM SCH MG: 40 TABLET, DELAYED RELEASE ORAL at 09:00

## 2019-09-04 RX ADMIN — Medication SCH GM: at 22:18

## 2019-09-04 RX ADMIN — MINERAL OIL SCH ML: 1 OIL ORAL at 09:05

## 2019-09-04 RX ADMIN — POTASSIUM CHLORIDE, DEXTROSE MONOHYDRATE AND SODIUM CHLORIDE SCH MLS/HR: 75; 5; 450 INJECTION, SOLUTION INTRAVENOUS at 22:00

## 2019-09-04 RX ADMIN — Medication SCH CAP: at 09:05

## 2019-09-04 RX ADMIN — POTASSIUM CHLORIDE, DEXTROSE MONOHYDRATE AND SODIUM CHLORIDE SCH MLS/HR: 75; 5; 450 INJECTION, SOLUTION INTRAVENOUS at 09:44

## 2019-09-04 RX ADMIN — POTASSIUM CHLORIDE, DEXTROSE MONOHYDRATE AND SODIUM CHLORIDE SCH MLS/HR: 75; 5; 450 INJECTION, SOLUTION INTRAVENOUS at 01:35

## 2019-09-04 RX ADMIN — MELOXICAM SCH MG: 7.5 TABLET ORAL at 09:05

## 2019-09-05 VITALS — SYSTOLIC BLOOD PRESSURE: 119 MMHG

## 2019-09-05 VITALS — SYSTOLIC BLOOD PRESSURE: 112 MMHG

## 2019-09-05 VITALS — SYSTOLIC BLOOD PRESSURE: 145 MMHG

## 2019-09-05 VITALS — SYSTOLIC BLOOD PRESSURE: 121 MMHG

## 2019-09-05 VITALS — SYSTOLIC BLOOD PRESSURE: 125 MMHG

## 2019-09-05 RX ADMIN — MELOXICAM SCH MG: 7.5 TABLET ORAL at 21:12

## 2019-09-05 RX ADMIN — Medication SCH GM: at 09:00

## 2019-09-05 RX ADMIN — MINERAL OIL SCH ML: 1 OIL ORAL at 17:49

## 2019-09-05 RX ADMIN — POTASSIUM CHLORIDE, DEXTROSE MONOHYDRATE AND SODIUM CHLORIDE SCH MLS/HR: 75; 5; 450 INJECTION, SOLUTION INTRAVENOUS at 03:13

## 2019-09-05 RX ADMIN — PANTOPRAZOLE SODIUM SCH MG: 40 TABLET, DELAYED RELEASE ORAL at 17:55

## 2019-09-05 RX ADMIN — Medication SCH CAP: at 17:50

## 2019-09-05 RX ADMIN — Medication SCH GM: at 21:12

## 2019-09-05 RX ADMIN — POTASSIUM CHLORIDE, DEXTROSE MONOHYDRATE AND SODIUM CHLORIDE SCH MLS/HR: 75; 5; 450 INJECTION, SOLUTION INTRAVENOUS at 17:53

## 2019-09-05 RX ADMIN — MELOXICAM SCH MG: 7.5 TABLET ORAL at 17:51

## 2019-09-05 RX ADMIN — PANTOPRAZOLE SODIUM SCH MG: 40 TABLET, DELAYED RELEASE ORAL at 21:12

## 2019-09-06 VITALS — SYSTOLIC BLOOD PRESSURE: 100 MMHG

## 2019-09-06 VITALS — SYSTOLIC BLOOD PRESSURE: 107 MMHG

## 2019-09-06 VITALS — SYSTOLIC BLOOD PRESSURE: 111 MMHG

## 2019-09-06 VITALS — SYSTOLIC BLOOD PRESSURE: 126 MMHG

## 2019-09-06 VITALS — SYSTOLIC BLOOD PRESSURE: 118 MMHG

## 2019-09-06 RX ADMIN — Medication SCH GM: at 08:38

## 2019-09-06 RX ADMIN — POTASSIUM CHLORIDE, DEXTROSE MONOHYDRATE AND SODIUM CHLORIDE SCH MLS/HR: 75; 5; 450 INJECTION, SOLUTION INTRAVENOUS at 13:59

## 2019-09-06 RX ADMIN — MELOXICAM SCH MG: 7.5 TABLET ORAL at 08:37

## 2019-09-06 RX ADMIN — POTASSIUM CHLORIDE, DEXTROSE MONOHYDRATE AND SODIUM CHLORIDE SCH MLS/HR: 75; 5; 450 INJECTION, SOLUTION INTRAVENOUS at 03:58

## 2019-09-06 RX ADMIN — Medication SCH GM: at 08:37

## 2019-09-06 RX ADMIN — MINERAL OIL SCH ML: 1 OIL ORAL at 08:36

## 2019-09-06 RX ADMIN — PANTOPRAZOLE SODIUM SCH MG: 40 TABLET, DELAYED RELEASE ORAL at 08:37

## 2019-09-06 RX ADMIN — Medication SCH CAP: at 08:36

## 2019-09-06 RX ADMIN — Medication SCH GM: at 21:00

## 2019-09-07 VITALS — SYSTOLIC BLOOD PRESSURE: 109 MMHG

## 2019-09-07 VITALS — SYSTOLIC BLOOD PRESSURE: 104 MMHG

## 2019-09-07 VITALS — SYSTOLIC BLOOD PRESSURE: 103 MMHG

## 2019-09-07 RX ADMIN — MINERAL OIL SCH ML: 1 OIL ORAL at 08:48

## 2019-09-07 RX ADMIN — PANTOPRAZOLE SODIUM SCH MG: 40 TABLET, DELAYED RELEASE ORAL at 01:17

## 2019-09-07 RX ADMIN — MELOXICAM SCH MG: 7.5 TABLET ORAL at 01:17

## 2019-09-07 RX ADMIN — Medication SCH GM: at 08:48

## 2019-09-07 RX ADMIN — Medication SCH CAP: at 08:48

## 2019-09-07 RX ADMIN — MELOXICAM SCH MG: 7.5 TABLET ORAL at 08:48
